# Patient Record
Sex: MALE | Race: WHITE | NOT HISPANIC OR LATINO
[De-identification: names, ages, dates, MRNs, and addresses within clinical notes are randomized per-mention and may not be internally consistent; named-entity substitution may affect disease eponyms.]

---

## 2017-02-10 PROBLEM — Z00.00 ENCOUNTER FOR PREVENTIVE HEALTH EXAMINATION: Status: ACTIVE | Noted: 2017-02-10

## 2017-02-17 ENCOUNTER — APPOINTMENT (OUTPATIENT)
Dept: WOUND CARE | Facility: CLINIC | Age: 67
End: 2017-02-17

## 2017-02-24 ENCOUNTER — APPOINTMENT (OUTPATIENT)
Dept: WOUND CARE | Facility: CLINIC | Age: 67
End: 2017-02-24

## 2017-02-24 VITALS
DIASTOLIC BLOOD PRESSURE: 74 MMHG | HEIGHT: 71 IN | RESPIRATION RATE: 16 BRPM | TEMPERATURE: 97.6 F | SYSTOLIC BLOOD PRESSURE: 152 MMHG | HEART RATE: 88 BPM

## 2017-03-10 ENCOUNTER — APPOINTMENT (OUTPATIENT)
Dept: WOUND CARE | Facility: CLINIC | Age: 67
End: 2017-03-10

## 2017-04-10 ENCOUNTER — APPOINTMENT (OUTPATIENT)
Dept: WOUND CARE | Facility: CLINIC | Age: 67
End: 2017-04-10

## 2017-04-17 ENCOUNTER — APPOINTMENT (OUTPATIENT)
Dept: WOUND CARE | Facility: CLINIC | Age: 67
End: 2017-04-17

## 2017-04-28 ENCOUNTER — APPOINTMENT (OUTPATIENT)
Dept: WOUND CARE | Facility: CLINIC | Age: 67
End: 2017-04-28

## 2017-05-19 ENCOUNTER — APPOINTMENT (OUTPATIENT)
Dept: WOUND CARE | Facility: CLINIC | Age: 67
End: 2017-05-19

## 2017-05-26 ENCOUNTER — APPOINTMENT (OUTPATIENT)
Dept: WOUND CARE | Facility: CLINIC | Age: 67
End: 2017-05-26

## 2017-06-02 ENCOUNTER — APPOINTMENT (OUTPATIENT)
Dept: WOUND CARE | Facility: CLINIC | Age: 67
End: 2017-06-02

## 2017-06-09 ENCOUNTER — INPATIENT (INPATIENT)
Facility: HOSPITAL | Age: 67
LOS: 6 days | Discharge: HOME | DRG: 603 | End: 2017-06-16
Attending: INTERNAL MEDICINE | Admitting: INTERNAL MEDICINE
Payer: COMMERCIAL

## 2017-06-09 ENCOUNTER — APPOINTMENT (OUTPATIENT)
Dept: WOUND CARE | Facility: CLINIC | Age: 67
End: 2017-06-09

## 2017-06-09 VITALS
DIASTOLIC BLOOD PRESSURE: 56 MMHG | HEART RATE: 79 BPM | OXYGEN SATURATION: 98 % | RESPIRATION RATE: 14 BRPM | SYSTOLIC BLOOD PRESSURE: 160 MMHG

## 2017-06-09 DIAGNOSIS — R60.0 LOCALIZED EDEMA: ICD-10-CM

## 2017-06-09 DIAGNOSIS — Z29.9 ENCOUNTER FOR PROPHYLACTIC MEASURES, UNSPECIFIED: ICD-10-CM

## 2017-06-09 DIAGNOSIS — I89.0 LYMPHEDEMA, NOT ELSEWHERE CLASSIFIED: ICD-10-CM

## 2017-06-09 DIAGNOSIS — L03.119 CELLULITIS OF UNSPECIFIED PART OF LIMB: ICD-10-CM

## 2017-06-09 DIAGNOSIS — I87.2 VENOUS INSUFFICIENCY (CHRONIC) (PERIPHERAL): ICD-10-CM

## 2017-06-09 LAB
ALBUMIN SERPL ELPH-MCNC: 3.4 G/DL — SIGNIFICANT CHANGE UP (ref 3.3–5)
ALP SERPL-CCNC: 82 U/L — SIGNIFICANT CHANGE UP (ref 40–120)
ALT FLD-CCNC: 16 U/L RC — SIGNIFICANT CHANGE UP (ref 10–45)
ANION GAP SERPL CALC-SCNC: 12 MMOL/L — SIGNIFICANT CHANGE UP (ref 5–17)
APTT BLD: 51.4 SEC — HIGH (ref 27.5–37.4)
AST SERPL-CCNC: 14 U/L — SIGNIFICANT CHANGE UP (ref 10–40)
BASOPHILS # BLD AUTO: 0.1 K/UL — SIGNIFICANT CHANGE UP (ref 0–0.2)
BASOPHILS NFR BLD AUTO: 0.6 % — SIGNIFICANT CHANGE UP (ref 0–2)
BILIRUB SERPL-MCNC: 0.3 MG/DL — SIGNIFICANT CHANGE UP (ref 0.2–1.2)
BUN SERPL-MCNC: 17 MG/DL — SIGNIFICANT CHANGE UP (ref 7–23)
CALCIUM SERPL-MCNC: 9.1 MG/DL — SIGNIFICANT CHANGE UP (ref 8.4–10.5)
CHLORIDE SERPL-SCNC: 105 MMOL/L — SIGNIFICANT CHANGE UP (ref 96–108)
CO2 SERPL-SCNC: 25 MMOL/L — SIGNIFICANT CHANGE UP (ref 22–31)
CREAT SERPL-MCNC: 0.96 MG/DL — SIGNIFICANT CHANGE UP (ref 0.5–1.3)
EOSINOPHIL # BLD AUTO: 0.2 K/UL — SIGNIFICANT CHANGE UP (ref 0–0.5)
EOSINOPHIL NFR BLD AUTO: 1.8 % — SIGNIFICANT CHANGE UP (ref 0–6)
GAS PNL BLDV: SIGNIFICANT CHANGE UP
GLUCOSE SERPL-MCNC: 109 MG/DL — HIGH (ref 70–99)
HCT VFR BLD CALC: 38.5 % — LOW (ref 39–50)
HGB BLD-MCNC: 12.8 G/DL — LOW (ref 13–17)
INR BLD: 1.1 RATIO — SIGNIFICANT CHANGE UP (ref 0.88–1.16)
LYMPHOCYTES # BLD AUTO: 1.4 K/UL — SIGNIFICANT CHANGE UP (ref 1–3.3)
LYMPHOCYTES # BLD AUTO: 15.3 % — SIGNIFICANT CHANGE UP (ref 13–44)
MCHC RBC-ENTMCNC: 30.5 PG — SIGNIFICANT CHANGE UP (ref 27–34)
MCHC RBC-ENTMCNC: 33.3 GM/DL — SIGNIFICANT CHANGE UP (ref 32–36)
MCV RBC AUTO: 91.4 FL — SIGNIFICANT CHANGE UP (ref 80–100)
MONOCYTES # BLD AUTO: 0.9 K/UL — SIGNIFICANT CHANGE UP (ref 0–0.9)
MONOCYTES NFR BLD AUTO: 9.7 % — SIGNIFICANT CHANGE UP (ref 2–14)
NEUTROPHILS # BLD AUTO: 6.4 K/UL — SIGNIFICANT CHANGE UP (ref 1.8–7.4)
NEUTROPHILS NFR BLD AUTO: 72.6 % — SIGNIFICANT CHANGE UP (ref 43–77)
NT-PROBNP SERPL-SCNC: 95 PG/ML — SIGNIFICANT CHANGE UP (ref 0–300)
PLATELET # BLD AUTO: 255 K/UL — SIGNIFICANT CHANGE UP (ref 150–400)
POTASSIUM SERPL-MCNC: 3.9 MMOL/L — SIGNIFICANT CHANGE UP (ref 3.5–5.3)
POTASSIUM SERPL-SCNC: 3.9 MMOL/L — SIGNIFICANT CHANGE UP (ref 3.5–5.3)
PROT SERPL-MCNC: 6.8 G/DL — SIGNIFICANT CHANGE UP (ref 6–8.3)
PROTHROM AB SERPL-ACNC: 11.9 SEC — SIGNIFICANT CHANGE UP (ref 9.8–12.7)
RBC # BLD: 4.21 M/UL — SIGNIFICANT CHANGE UP (ref 4.2–5.8)
RBC # FLD: 12.7 % — SIGNIFICANT CHANGE UP (ref 10.3–14.5)
SODIUM SERPL-SCNC: 142 MMOL/L — SIGNIFICANT CHANGE UP (ref 135–145)
WBC # BLD: 8.8 K/UL — SIGNIFICANT CHANGE UP (ref 3.8–10.5)
WBC # FLD AUTO: 8.8 K/UL — SIGNIFICANT CHANGE UP (ref 3.8–10.5)

## 2017-06-09 PROCEDURE — 99285 EMERGENCY DEPT VISIT HI MDM: CPT

## 2017-06-09 PROCEDURE — 99223 1ST HOSP IP/OBS HIGH 75: CPT | Mod: AI

## 2017-06-09 RX ORDER — ACETAMINOPHEN 500 MG
650 TABLET ORAL EVERY 6 HOURS
Qty: 0 | Refills: 0 | Status: DISCONTINUED | OUTPATIENT
Start: 2017-06-09 | End: 2017-06-16

## 2017-06-09 RX ORDER — VANCOMYCIN HCL 1 G
1000 VIAL (EA) INTRAVENOUS EVERY 12 HOURS
Qty: 0 | Refills: 0 | Status: DISCONTINUED | OUTPATIENT
Start: 2017-06-09 | End: 2017-06-10

## 2017-06-09 RX ORDER — CEFAZOLIN SODIUM 1 G
1000 VIAL (EA) INJECTION ONCE
Qty: 0 | Refills: 0 | Status: COMPLETED | OUTPATIENT
Start: 2017-06-09 | End: 2017-06-09

## 2017-06-09 RX ORDER — BACITRACIN ZINC 500 UNIT/G
1 OINTMENT IN PACKET (EA) TOPICAL
Qty: 0 | Refills: 0 | Status: DISCONTINUED | OUTPATIENT
Start: 2017-06-09 | End: 2017-06-16

## 2017-06-09 RX ORDER — TETANUS TOXOID, REDUCED DIPHTHERIA TOXOID AND ACELLULAR PERTUSSIS VACCINE, ADSORBED 5; 2.5; 8; 8; 2.5 [IU]/.5ML; [IU]/.5ML; UG/.5ML; UG/.5ML; UG/.5ML
0.5 SUSPENSION INTRAMUSCULAR ONCE
Qty: 0 | Refills: 0 | Status: COMPLETED | OUTPATIENT
Start: 2017-06-09 | End: 2017-06-09

## 2017-06-09 RX ORDER — HEPARIN SODIUM 5000 [USP'U]/ML
5000 INJECTION INTRAVENOUS; SUBCUTANEOUS EVERY 8 HOURS
Qty: 0 | Refills: 0 | Status: DISCONTINUED | OUTPATIENT
Start: 2017-06-09 | End: 2017-06-16

## 2017-06-09 RX ORDER — LOSARTAN POTASSIUM 25 MG/1
25 TABLET, FILM COATED ORAL
Refills: 0 | Status: ACTIVE | COMMUNITY

## 2017-06-09 RX ADMIN — Medication 250 MILLIGRAM(S): at 23:03

## 2017-06-09 RX ADMIN — Medication 650 MILLIGRAM(S): at 23:36

## 2017-06-09 RX ADMIN — Medication 100 MILLIGRAM(S): at 20:45

## 2017-06-09 RX ADMIN — HEPARIN SODIUM 5000 UNIT(S): 5000 INJECTION INTRAVENOUS; SUBCUTANEOUS at 23:04

## 2017-06-09 RX ADMIN — Medication 650 MILLIGRAM(S): at 23:06

## 2017-06-09 RX ADMIN — TETANUS TOXOID, REDUCED DIPHTHERIA TOXOID AND ACELLULAR PERTUSSIS VACCINE, ADSORBED 0.5 MILLILITER(S): 5; 2.5; 8; 8; 2.5 SUSPENSION INTRAMUSCULAR at 20:45

## 2017-06-09 NOTE — ED ADULT NURSE NOTE - OBJECTIVE STATEMENT
67 y.o male c c/o lower leg edema. Pt has wound care set up. R lower leg more edematous then L leg.  Ace bandages on legs upon exam- re wrapped today. weeping edema from R lower- foul smelling drainage. Afebrile. Lower legs extremely red. Blisters present on toes exposed from ace badging. No SOB/CP. No NVD. No family at bedside. Poor personal hygiene.

## 2017-06-09 NOTE — ED PROVIDER NOTE - PHYSICAL EXAMINATION
Galilea Barajas M.D.:   patient awake alert seen lying on stretcher, appears disheveled with poor hygiene NAD .   LUNGS coarse breath sounds b/l.   CARD RRR no m/r/g.    Abdomen soft obese NT ND no rebound no guarding no CVA tenderness umbilical hernia.   EXT WWP significant pitting and weeping edema of b/l LE up to knees with associated redness, tender to palpation CV 2+DP/PT bilaterally.   neuro A&Ox3 .    skin warm and dry  HEENT: moist mucous membranes, PERRL, EOMI

## 2017-06-09 NOTE — ED PROVIDER NOTE - OBJECTIVE STATEMENT
Galilea Barajas M.D: 67yoM hx HTN, lymphedema, chronic venous stasis, follows with wound care, homeless, sent in from wound care secondary to worsening of swelling/pain/ weeping of lower extremities. increasing blisters to b/l LE and increasing pain. denies SOB, CP, fevers/chills, recent travel, smoking. last had US in January with vascular.

## 2017-06-09 NOTE — H&P ADULT - PMH
Chronic venous stasis dermatitis of both lower extremities    Lymphedema Chronic venous stasis dermatitis of both lower extremities    Essential hypertension    Lymphedema

## 2017-06-09 NOTE — H&P ADULT - HISTORY OF PRESENT ILLNESS
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 notes he follows with wound care once a week. he has chronic bl le lymphedema and chronic venous stasis and over the past week he has noticed worsening swelling, redness, warmth and pain with walking. he notes wounds have developed on both of his legs which are draining pus and clear fluid. denies fevers, chills, night sweats, chest pain, sob, abd pain, diarrhea, nausea, vomiting. he has dopplers of his legs every 6 months to r/o dvt, last were negative in january.    ED VS: Tmax: 98, BP: 152-160/56-74, P: 75-79, R: 14-16, O2: 98-99% on RA  ED meds: ancef 1g, tdap 0.5mL IM

## 2017-06-09 NOTE — H&P ADULT - ASSESSMENT
67M hx htn, lymphedema, chronic venous stasis (f/w wound care) p/w worsening swelling, pain, weeping from lower extremities.

## 2017-06-09 NOTE — H&P ADULT - PROBLEM SELECTOR PROBLEM 3
Chronic venous stasis dermatitis of both lower extremities

## 2017-06-09 NOTE — H&P ADULT - PROBLEM SELECTOR PLAN 4
heparin subq heparin subq  homeless - needs social work consult for safe dispo  dash diet  care discussed w/ np  ads to assume care in am  verify meds in am with [facility] [phone] ([phone] [facility] [place]

## 2017-06-09 NOTE — H&P ADULT - NSHPPHYSICALEXAM_GEN_ALL_CORE
GENERAL: No acute distress, well-developed  HEAD:  Atraumatic, Normocephalic  ENT: EOMI, PERRLA, conjunctiva and sclera clear, Neck supple, No JVD, moist mucosa  CHEST/LUNG: Clear to auscultation bilaterally; No wheeze, equal breath sounds bilaterally   BACK: No spinal tenderness, ROM intact  HEART: Regular rate and rhythm; No murmurs, rubs, or gallops  ABDOMEN: Soft, Nontender, Nondistended; Bowel sounds present  EXTREMITIES:  No clubbing, cyanosis, or edema  PSYCH: Normal behavior/affect  NEUROLOGY: AAOx3, non-focal, cranial nerves intact  SKIN: Normal color, No rashes or lesions GENERAL: No acute distress, well-developed  HEAD:  Atraumatic, Normocephalic  ENT: EOMI, PERRLA, conjunctiva and sclera clear, Neck supple, No JVD, moist mucosa  CHEST/LUNG: Clear to auscultation bilaterally; No wheeze, equal breath sounds bilaterally   BACK: No spinal tenderness, ROM intact  HEART: Regular rate and rhythm; No murmurs, rubs, or gallops  ABDOMEN: Soft, Nontender, Nondistended; Bowel sounds present  EXTREMITIES:  bl severe lower extremity edema, R>L, w/ overlying open anterior le wounds w/ serous drainage, tender/warm to touch, 2+ DP pulses bilaterally  PSYCH: Normal behavior/affect  NEUROLOGY: AAOx3, non-focal, cranial nerves intact  SKIN: bl severe lower extremity edema, R>L, w/ overlying open anterior le wounds w/ serous drainage, tender/warm to touch

## 2017-06-09 NOTE — ED PROVIDER NOTE - MEDICAL DECISION MAKING DETAILS
Galilea Barajas M.D: 67yoM w/ chronic b/l LE wounds that are reportedly worsening. no systemic signs of infection to suspect cellulitis, and no SOB/CP, dyspnea on exertion to suggest a CHF picture; will check basic labs, BNP; likely pt will require admission as pt also homeless and has no safe discharge plan

## 2017-06-09 NOTE — H&P ADULT - NSHPLABSRESULTS_GEN_ALL_CORE
Labs personally reviewed:                        12.8   8.8   )-----------( 255      ( 09 Jun 2017 19:23 )             38.5     06-09  142  |  105  |  17  ----------------------------<  109<H>  3.9   |  25  |  0.96  Ca    9.1      09 Jun 2017 19:23  TPro  6.8  /  Alb  3.4  /  TBili  0.3  /  DBili  x   /  AST  14  /  ALT  16  /  AlkPhos  82  06-09  LIVER FUNCTIONS - ( 09 Jun 2017 19:23 )  Alb: 3.4 g/dL / Pro: 6.8 g/dL / ALK PHOS: 82 U/L / ALT: 16 U/L RC / AST: 14 U/L / GGT: x         PT/INR - ( 09 Jun 2017 19:23 )   PT: 11.9 sec;   INR: 1.10 ratio       PTT - ( 09 Jun 2017 19:23 )  PTT:51.4 sec    Imaging:  No imaging performed.    EKG ordered.

## 2017-06-09 NOTE — ED PROVIDER NOTE - CARE PLAN
Principal Discharge DX:	Bilateral edema of lower extremity Principal Discharge DX:	Bilateral edema of lower extremity  Secondary Diagnosis:	Cellulitis of right lower limb

## 2017-06-09 NOTE — H&P ADULT - NSHPSOCIALHISTORY_GEN_ALL_CORE
Social History:    Marital Status:  (   )    (   ) Single    (   )    (  )   Occupation:   Lives with: (  ) alone  (  ) children   (  ) spouse   (  ) parents  (  ) other    Substance Use (street drugs): (  ) never used  (  ) other:  Tobacco Usage:  (   ) never smoked   (   ) former smoker   (   ) current smoker  (     ) pack year  (        ) last cigarette date  Alcohol Usage: no etoh usage Social History:    Marital Status:  (   )    ( x  ) Single    (   )    (  )   Occupation:   Lives with: (  ) alone  (  ) children   (  ) spouse   (  ) parents  ( x ) other - homeless    Substance Use (street drugs): ( x ) never used  (  ) other:  Tobacco Usage:  (  x ) never smoked   (   ) former smoker   (   ) current smoker  (     ) pack year  (        ) last cigarette date  Alcohol: no etoh usage

## 2017-06-09 NOTE — H&P ADULT - NSHPREVIEWOFSYSTEMS_GEN_ALL_CORE
REVIEW OF SYSTEMS:    CONSTITUTIONAL: No weakness, fevers or chills  EYES/ENT: No visual changes;  No dysphagia  NECK: No pain or stiffness  RESPIRATORY: No cough, wheezing, hemoptysis; No shortness of breath  CARDIOVASCULAR: No chest pain or palpitations; No lower extremity edema  GASTROINTESTINAL: No abdominal or epigastric pain. No nausea, vomiting, or hematemesis; No diarrhea or constipation. No melena or hematochezia.  BACK: No back pain  GENITOURINARY: No dysuria, frequency or hematuria  NEUROLOGICAL: No numbness or weakness  EXTREMITIES:   SKIN: No itching, burning, rashes, or lesions   PSYCH: no agitation  All other review of systems is negative unless indicated above. CONSTITUTIONAL: No weakness, fevers or chills  EYES/ENT: No visual changes;  No dysphagia  NECK: No pain or stiffness  RESPIRATORY: No cough, wheezing, hemoptysis; No shortness of breath  CARDIOVASCULAR: No chest pain or palpitations; No lower extremity edema  GASTROINTESTINAL: No abdominal or epigastric pain. No nausea, vomiting, or hematemesis; No diarrhea or constipation. No melena or hematochezia.  BACK: No back pain  GENITOURINARY: No dysuria, frequency or hematuria  NEUROLOGICAL: No numbness or weakness  EXTREMITIES: bl le pain, swelling, redness, drainage  SKIN: No itching, burning  PSYCH: no agitation  All other review of systems is negative unless indicated above.

## 2017-06-09 NOTE — ED PROVIDER NOTE - ATTENDING CONTRIBUTION TO CARE
referred to emergency department by wound care specialist for worsening discharge and redness of both legs, right greater than left, on my exam:  awake, alert, cooperative, bilateral swollen legs, clear discharge of right leg, multiple open areas, no lymphangitis, neurovascular intact. Jeramie Baxter MD (attending)

## 2017-06-09 NOTE — H&P ADULT - PROBLEM SELECTOR PLAN 1
afebrile, no leukocytosis. exam w/ le redness, pain, drainage c/f purulent cellulitis.  -c/w iv abx w/ vancomycin  -pain control w/ percocet  -elevate legs qid  -homeless - needs social work consult for safe dispo Non-toxic, afebrile, no leukocytosis. exam w/ le redness, pain, drainage c/f purulent cellulitis.  -c/w iv abx w/ vancomycin  -monitor cr, trough after 4th dose  -pain control w/ percocet  -apply bacitracin, gauze dressing  -wound care consult  -elevate legs qid Non-toxic, afebrile, no leukocytosis. exam w/ le redness, pain, drainage c/f purulent cellulitis.  -c/w iv abx w/ vancomycin  -monitor cr, trough after 4th dose  -pain control w/ percocet  -apply bacitracin, gauze dressing  -wound care consult  -elevate legs qid  -check le dopplers

## 2017-06-09 NOTE — ED ADULT NURSE REASSESSMENT NOTE - NS ED NURSE REASSESS COMMENT FT1
Attempted to give report at 20:09 and 20:15. Called [facility] Gus twice was placed on hold for over 6 minutes each time, with no one returning to me. Unable to give report to anyone.

## 2017-06-10 DIAGNOSIS — I10 ESSENTIAL (PRIMARY) HYPERTENSION: ICD-10-CM

## 2017-06-10 DIAGNOSIS — L03.119 CELLULITIS OF UNSPECIFIED PART OF LIMB: ICD-10-CM

## 2017-06-10 DIAGNOSIS — Z59.0 HOMELESSNESS: ICD-10-CM

## 2017-06-10 DIAGNOSIS — H40.9 UNSPECIFIED GLAUCOMA: ICD-10-CM

## 2017-06-10 LAB
ANION GAP SERPL CALC-SCNC: 10 MMOL/L — SIGNIFICANT CHANGE UP (ref 5–17)
BASOPHILS # BLD AUTO: 0.02 K/UL — SIGNIFICANT CHANGE UP (ref 0–0.2)
BASOPHILS NFR BLD AUTO: 0.2 % — SIGNIFICANT CHANGE UP (ref 0–2)
BUN SERPL-MCNC: 14 MG/DL — SIGNIFICANT CHANGE UP (ref 7–23)
CALCIUM SERPL-MCNC: 8.7 MG/DL — SIGNIFICANT CHANGE UP (ref 8.4–10.5)
CHLORIDE SERPL-SCNC: 104 MMOL/L — SIGNIFICANT CHANGE UP (ref 96–108)
CHOLEST SERPL-MCNC: 119 MG/DL — SIGNIFICANT CHANGE UP (ref 10–199)
CO2 SERPL-SCNC: 20 MMOL/L — LOW (ref 22–31)
CREAT SERPL-MCNC: 0.97 MG/DL — SIGNIFICANT CHANGE UP (ref 0.5–1.3)
EOSINOPHIL # BLD AUTO: 0.12 K/UL — SIGNIFICANT CHANGE UP (ref 0–0.5)
EOSINOPHIL NFR BLD AUTO: 1.4 % — SIGNIFICANT CHANGE UP (ref 0–6)
GLUCOSE SERPL-MCNC: 145 MG/DL — HIGH (ref 70–99)
HBA1C BLD-MCNC: 6.1 % — HIGH (ref 4–5.6)
HCT VFR BLD CALC: 36.6 % — LOW (ref 39–50)
HDLC SERPL-MCNC: 50 MG/DL — SIGNIFICANT CHANGE UP (ref 40–125)
HGB BLD-MCNC: 11.7 G/DL — LOW (ref 13–17)
IMM GRANULOCYTES NFR BLD AUTO: 0.2 % — SIGNIFICANT CHANGE UP (ref 0–1.5)
LIPID PNL WITH DIRECT LDL SERPL: 54 MG/DL — SIGNIFICANT CHANGE UP
LYMPHOCYTES # BLD AUTO: 1.24 K/UL — SIGNIFICANT CHANGE UP (ref 1–3.3)
LYMPHOCYTES # BLD AUTO: 14.7 % — SIGNIFICANT CHANGE UP (ref 13–44)
MCHC RBC-ENTMCNC: 28.3 PG — SIGNIFICANT CHANGE UP (ref 27–34)
MCHC RBC-ENTMCNC: 32 GM/DL — SIGNIFICANT CHANGE UP (ref 32–36)
MCV RBC AUTO: 88.6 FL — SIGNIFICANT CHANGE UP (ref 80–100)
MONOCYTES # BLD AUTO: 0.89 K/UL — SIGNIFICANT CHANGE UP (ref 0–0.9)
MONOCYTES NFR BLD AUTO: 10.6 % — SIGNIFICANT CHANGE UP (ref 2–14)
NEUTROPHILS # BLD AUTO: 6.14 K/UL — SIGNIFICANT CHANGE UP (ref 1.8–7.4)
NEUTROPHILS NFR BLD AUTO: 72.9 % — SIGNIFICANT CHANGE UP (ref 43–77)
PLATELET # BLD AUTO: 263 K/UL — SIGNIFICANT CHANGE UP (ref 150–400)
POTASSIUM SERPL-MCNC: 4 MMOL/L — SIGNIFICANT CHANGE UP (ref 3.5–5.3)
POTASSIUM SERPL-SCNC: 4 MMOL/L — SIGNIFICANT CHANGE UP (ref 3.5–5.3)
RBC # BLD: 4.13 M/UL — LOW (ref 4.2–5.8)
RBC # FLD: 14 % — SIGNIFICANT CHANGE UP (ref 10.3–14.5)
SODIUM SERPL-SCNC: 134 MMOL/L — LOW (ref 135–145)
TOTAL CHOLESTEROL/HDL RATIO MEASUREMENT: 2.4 RATIO — LOW (ref 3.4–9.6)
TRIGL SERPL-MCNC: 73 MG/DL — SIGNIFICANT CHANGE UP (ref 10–149)
TSH SERPL-MCNC: 0.9 UIU/ML — SIGNIFICANT CHANGE UP (ref 0.27–4.2)
WBC # BLD: 8.43 K/UL — SIGNIFICANT CHANGE UP (ref 3.8–10.5)
WBC # FLD AUTO: 8.43 K/UL — SIGNIFICANT CHANGE UP (ref 3.8–10.5)

## 2017-06-10 PROCEDURE — 99233 SBSQ HOSP IP/OBS HIGH 50: CPT

## 2017-06-10 PROCEDURE — 93010 ELECTROCARDIOGRAM REPORT: CPT

## 2017-06-10 RX ORDER — LATANOPROST 0.05 MG/ML
1 SOLUTION/ DROPS OPHTHALMIC; TOPICAL AT BEDTIME
Qty: 0 | Refills: 0 | Status: DISCONTINUED | OUTPATIENT
Start: 2017-06-10 | End: 2017-06-16

## 2017-06-10 RX ORDER — METOPROLOL TARTRATE 50 MG
50 TABLET ORAL
Qty: 0 | Refills: 0 | Status: DISCONTINUED | OUTPATIENT
Start: 2017-06-10 | End: 2017-06-16

## 2017-06-10 RX ORDER — VANCOMYCIN HCL 1 G
1000 VIAL (EA) INTRAVENOUS EVERY 8 HOURS
Qty: 0 | Refills: 0 | Status: DISCONTINUED | OUTPATIENT
Start: 2017-06-10 | End: 2017-06-13

## 2017-06-10 RX ORDER — VANCOMYCIN HCL 1 G
1000 VIAL (EA) INTRAVENOUS ONCE
Qty: 0 | Refills: 0 | Status: COMPLETED | OUTPATIENT
Start: 2017-06-10 | End: 2017-06-10

## 2017-06-10 RX ORDER — VANCOMYCIN HCL 1 G
VIAL (EA) INTRAVENOUS
Qty: 0 | Refills: 0 | Status: DISCONTINUED | OUTPATIENT
Start: 2017-06-10 | End: 2017-06-13

## 2017-06-10 RX ORDER — POTASSIUM CHLORIDE 20 MEQ
10 PACKET (EA) ORAL DAILY
Qty: 0 | Refills: 0 | Status: DISCONTINUED | OUTPATIENT
Start: 2017-06-10 | End: 2017-06-16

## 2017-06-10 RX ORDER — HYDRALAZINE HCL 50 MG
100 TABLET ORAL
Qty: 0 | Refills: 0 | Status: DISCONTINUED | OUTPATIENT
Start: 2017-06-10 | End: 2017-06-10

## 2017-06-10 RX ORDER — LOSARTAN POTASSIUM 100 MG/1
100 TABLET, FILM COATED ORAL DAILY
Qty: 0 | Refills: 0 | Status: DISCONTINUED | OUTPATIENT
Start: 2017-06-10 | End: 2017-06-16

## 2017-06-10 RX ORDER — FUROSEMIDE 40 MG
20 TABLET ORAL DAILY
Qty: 0 | Refills: 0 | Status: DISCONTINUED | OUTPATIENT
Start: 2017-06-10 | End: 2017-06-13

## 2017-06-10 RX ADMIN — Medication 650 MILLIGRAM(S): at 20:03

## 2017-06-10 RX ADMIN — Medication 20 MILLIGRAM(S): at 12:04

## 2017-06-10 RX ADMIN — Medication 250 MILLIGRAM(S): at 22:39

## 2017-06-10 RX ADMIN — Medication 650 MILLIGRAM(S): at 19:33

## 2017-06-10 RX ADMIN — Medication 10 MILLIEQUIVALENT(S): at 12:12

## 2017-06-10 RX ADMIN — HEPARIN SODIUM 5000 UNIT(S): 5000 INJECTION INTRAVENOUS; SUBCUTANEOUS at 21:30

## 2017-06-10 RX ADMIN — Medication 1 APPLICATION(S): at 05:21

## 2017-06-10 RX ADMIN — HEPARIN SODIUM 5000 UNIT(S): 5000 INJECTION INTRAVENOUS; SUBCUTANEOUS at 05:20

## 2017-06-10 RX ADMIN — LATANOPROST 1 DROP(S): 0.05 SOLUTION/ DROPS OPHTHALMIC; TOPICAL at 21:31

## 2017-06-10 RX ADMIN — Medication 250 MILLIGRAM(S): at 12:13

## 2017-06-10 RX ADMIN — Medication 50 MILLIGRAM(S): at 17:56

## 2017-06-10 RX ADMIN — LOSARTAN POTASSIUM 100 MILLIGRAM(S): 100 TABLET, FILM COATED ORAL at 12:04

## 2017-06-10 RX ADMIN — HEPARIN SODIUM 5000 UNIT(S): 5000 INJECTION INTRAVENOUS; SUBCUTANEOUS at 16:08

## 2017-06-10 SDOH — ECONOMIC STABILITY - HOUSING INSECURITY: HOMELESSNESS: Z59.0

## 2017-06-10 NOTE — PROGRESS NOTE ADULT - PROBLEM SELECTOR PLAN 2
Wound care consulted.  Leg elevated to help with edema.  Continue home dose of Lasix 20mg PO daily for diuresis.  Check LE duplex - r/o DVT

## 2017-06-10 NOTE — PROGRESS NOTE ADULT - SUBJECTIVE AND OBJECTIVE BOX
Patient is a 67y old  Male who presents with a chief complaint of Lower extremity pain, swelling, redness (09 Jun 2017 21:28)      SUBJECTIVE / OVERNIGHT EVENTS:    MEDICATIONS  (STANDING):  BACItracin   Ointment 1Application(s) Topical two times a day  heparin  Injectable 5000Unit(s) SubCutaneous every 8 hours  potassium chloride    Tablet ER 10milliEquivalent(s) Oral daily  furosemide    Tablet 20milliGRAM(s) Oral daily  metoprolol 50milliGRAM(s) Oral two times a day  losartan 100milliGRAM(s) Oral daily  latanoprost 0.005% Ophthalmic Solution 1Drop(s) Both EYES at bedtime  vancomycin  IVPB 1000milliGRAM(s) IV Intermittent once  vancomycin  IVPB 1000milliGRAM(s) IV Intermittent every 8 hours  vancomycin  IVPB  IV Intermittent     MEDICATIONS  (PRN):  oxyCODONE  5 mG/acetaminophen 325 mG 2Tablet(s) Oral every 6 hours PRN Severe Pain (7 - 10)  acetaminophen   Tablet 650milliGRAM(s) Oral every 6 hours PRN For Temp greater than 38 C (100.4 F)  acetaminophen   Tablet. 650milliGRAM(s) Oral every 6 hours PRN Moderate Pain (4 - 6)      Vital Signs Last 24 Hrs  T(C): 37.9, Max: 37.9 (06-10 @ 05:16)  HR: 98 (75 - 98)  BP: 150/63 (149/72 - 160/56)  RR: 18 (14 - 20)  SpO2: 95% (95% - 99%)  Wt(kg): --  CAPILLARY BLOOD GLUCOSE    I&O's Summary      PHYSICAL EXAM:  GENERAL: NAD, well-developed  HEAD:  Atraumatic, Normocephalic  EYES: EOMI, PERRLA, conjunctiva and sclera clear  NECK: Supple, No JVD  CHEST/LUNG: Clear to auscultation bilaterally; No wheeze  HEART: Regular rate and rhythm; No murmurs, rubs, or gallops  ABDOMEN: Soft, Nontender, mildly distended and obese abdomen; Bowel sounds present  EXTREMITIES:  No clubbing, cyanosis. Significant edema 4+ from lymphaedema  PSYCH: AAOx3  NEUROLOGY: non-focal  SKIN: bilateral LE wounds draining some pus associated with cellulitis.    LABS:                        11.7   8.43  )-----------( 263      ( 10 Joaquin 2017 07:47 )             36.6     06-10    134<L>  |  104  |  14  ----------------------------<  145<H>  4.0   |  20<L>  |  0.97    Ca    8.7      10 Joaquin 2017 07:44    TPro  6.8  /  Alb  3.4  /  TBili  0.3  /  DBili  x   /  AST  14  /  ALT  16  /  AlkPhos  82  06-09    PT/INR - ( 09 Jun 2017 19:23 )   PT: 11.9 sec;   INR: 1.10 ratio         PTT - ( 09 Jun 2017 19:23 )  PTT:51.4 sec

## 2017-06-10 NOTE — PROGRESS NOTE ADULT - PROBLEM SELECTOR PLAN 1
I discussed with our pharmacists - based on his renal function (normal) and weight (morbid obesity) in order to obtain a vancomycin trough of about 15, will need to dose Vancomycin 1g IV q8h.  Monitor to infection resolution.   The patient is nontoxic appearing, no leukocytosis, no fever. Blood cultures were nor sent in the ED (will order now).

## 2017-06-11 LAB
VANCOMYCIN TROUGH SERPL-MCNC: 10.8 UG/ML — SIGNIFICANT CHANGE UP (ref 10–20)
VANCOMYCIN TROUGH SERPL-MCNC: 8.7 UG/ML — LOW (ref 10–20)

## 2017-06-11 PROCEDURE — 93970 EXTREMITY STUDY: CPT | Mod: 26

## 2017-06-11 PROCEDURE — 99233 SBSQ HOSP IP/OBS HIGH 50: CPT

## 2017-06-11 RX ADMIN — Medication 10 MILLIEQUIVALENT(S): at 14:07

## 2017-06-11 RX ADMIN — Medication 50 MILLIGRAM(S): at 05:37

## 2017-06-11 RX ADMIN — HEPARIN SODIUM 5000 UNIT(S): 5000 INJECTION INTRAVENOUS; SUBCUTANEOUS at 22:47

## 2017-06-11 RX ADMIN — Medication 250 MILLIGRAM(S): at 05:37

## 2017-06-11 RX ADMIN — Medication 650 MILLIGRAM(S): at 04:08

## 2017-06-11 RX ADMIN — HEPARIN SODIUM 5000 UNIT(S): 5000 INJECTION INTRAVENOUS; SUBCUTANEOUS at 14:05

## 2017-06-11 RX ADMIN — Medication 650 MILLIGRAM(S): at 05:15

## 2017-06-11 RX ADMIN — LATANOPROST 1 DROP(S): 0.05 SOLUTION/ DROPS OPHTHALMIC; TOPICAL at 22:48

## 2017-06-11 RX ADMIN — Medication 325 MILLIGRAM(S): at 14:05

## 2017-06-11 RX ADMIN — Medication 20 MILLIGRAM(S): at 05:37

## 2017-06-11 RX ADMIN — LOSARTAN POTASSIUM 100 MILLIGRAM(S): 100 TABLET, FILM COATED ORAL at 14:05

## 2017-06-11 RX ADMIN — Medication 50 MILLIGRAM(S): at 17:57

## 2017-06-11 RX ADMIN — Medication 1 APPLICATION(S): at 05:35

## 2017-06-11 RX ADMIN — Medication 650 MILLIGRAM(S): at 15:14

## 2017-06-11 RX ADMIN — Medication 1 APPLICATION(S): at 22:25

## 2017-06-11 RX ADMIN — Medication 250 MILLIGRAM(S): at 14:08

## 2017-06-11 RX ADMIN — Medication 250 MILLIGRAM(S): at 22:47

## 2017-06-11 NOTE — PROGRESS NOTE ADULT - SUBJECTIVE AND OBJECTIVE BOX
Patient is a 67y old  Male who presents with a chief complaint of Lower extremity pain, swelling, redness (09 Jun 2017 21:28)      SUBJECTIVE / OVERNIGHT EVENTS:    MEDICATIONS  (STANDING):  BACItracin   Ointment 1Application(s) Topical two times a day  heparin  Injectable 5000Unit(s) SubCutaneous every 8 hours  potassium chloride    Tablet ER 10milliEquivalent(s) Oral daily  furosemide    Tablet 20milliGRAM(s) Oral daily  metoprolol 50milliGRAM(s) Oral two times a day  losartan 100milliGRAM(s) Oral daily  latanoprost 0.005% Ophthalmic Solution 1Drop(s) Both EYES at bedtime  vancomycin  IVPB 1000milliGRAM(s) IV Intermittent every 8 hours  vancomycin  IVPB  IV Intermittent     MEDICATIONS  (PRN):  oxyCODONE  5 mG/acetaminophen 325 mG 2Tablet(s) Oral every 6 hours PRN Severe Pain (7 - 10)  acetaminophen   Tablet 650milliGRAM(s) Oral every 6 hours PRN For Temp greater than 38 C (100.4 F)  acetaminophen   Tablet. 650milliGRAM(s) Oral every 6 hours PRN Moderate Pain (4 - 6)      Vital Signs Last 24 Hrs  T(C): 36.9, Max: 37.6 (06-10 @ 21:36)  HR: 71 (63 - 74)  BP: 155/78 (148/75 - 155/78)  RR: 20 (20 - 20)  SpO2: 96% (95% - 98%)  Wt(kg): --  CAPILLARY BLOOD GLUCOSE    I&O's Summary    I & Os for current day (as of 11 Jun 2017 09:35)  =============================================  IN: 480 ml / OUT: 950 ml / NET: -470 ml      PHYSICAL EXAM:  GENERAL: NAD, well-developed  HEAD:  Atraumatic, Normocephalic  EYES: EOMI, PERRLA, conjunctiva and sclera clear  NECK: Supple, No JVD  CHEST/LUNG: Clear to auscultation bilaterally; No wheeze  HEART: Regular rate and rhythm; No murmurs, rubs, or gallops  ABDOMEN: Soft, Nontender, mildly distended and obese abdomen; Bowel sounds present  EXTREMITIES:  No clubbing, cyanosis. Significant edema 4+ from lymphoedema  PSYCH: AAOx3  NEUROLOGY: non-focal  SKIN: bilateral LE wounds associated with cellulitis. appears stable from yesterday     LABS:                        11.7   8.43  )-----------( 263      ( 10 Joaquin 2017 07:47 )             36.6     06-10    134<L>  |  104  |  14  ----------------------------<  145<H>  4.0   |  20<L>  |  0.97    Ca    8.7      10 Joaquin 2017 07:44    TPro  6.8  /  Alb  3.4  /  TBili  0.3  /  DBili  x   /  AST  14  /  ALT  16  /  AlkPhos  82  06-09    PT/INR - ( 09 Jun 2017 19:23 )   PT: 11.9 sec;   INR: 1.10 ratio         PTT - ( 09 Jun 2017 19:23 )  PTT:51.4 sec

## 2017-06-11 NOTE — PROGRESS NOTE ADULT - PROBLEM SELECTOR PLAN 1
Continue to dose Vancomycin 1g IV q8h (calculated by pharmacy for trough ~15). Vanco trough was done prematurely - will recheck a level tonight around 10pm.  Cellulitis appears stable, but fevers resolved today.  Wound care to see patient likely tomorrow.

## 2017-06-11 NOTE — PROGRESS NOTE ADULT - PROBLEM SELECTOR PLAN 2
Wound care consulted.  Leg elevated to help with edema.  Continue home dose of Lasix 20mg PO daily for diuresis (may help a little)  LE duplex is pending - r/o DVT bilaterally

## 2017-06-12 DIAGNOSIS — L97.901 NON-PRESSURE CHRONIC ULCER OF UNSPECIFIED PART OF UNSPECIFIED LOWER LEG LIMITED TO BREAKDOWN OF SKIN: ICD-10-CM

## 2017-06-12 DIAGNOSIS — R60.0 LOCALIZED EDEMA: ICD-10-CM

## 2017-06-12 PROCEDURE — 99222 1ST HOSP IP/OBS MODERATE 55: CPT

## 2017-06-12 PROCEDURE — 99233 SBSQ HOSP IP/OBS HIGH 50: CPT

## 2017-06-12 RX ADMIN — LOSARTAN POTASSIUM 100 MILLIGRAM(S): 100 TABLET, FILM COATED ORAL at 06:10

## 2017-06-12 RX ADMIN — Medication 1 APPLICATION(S): at 06:11

## 2017-06-12 RX ADMIN — Medication 20 MILLIGRAM(S): at 06:10

## 2017-06-12 RX ADMIN — Medication 50 MILLIGRAM(S): at 17:57

## 2017-06-12 RX ADMIN — Medication 10 MILLIEQUIVALENT(S): at 11:36

## 2017-06-12 RX ADMIN — HEPARIN SODIUM 5000 UNIT(S): 5000 INJECTION INTRAVENOUS; SUBCUTANEOUS at 14:11

## 2017-06-12 RX ADMIN — HEPARIN SODIUM 5000 UNIT(S): 5000 INJECTION INTRAVENOUS; SUBCUTANEOUS at 21:38

## 2017-06-12 RX ADMIN — HEPARIN SODIUM 5000 UNIT(S): 5000 INJECTION INTRAVENOUS; SUBCUTANEOUS at 06:10

## 2017-06-12 RX ADMIN — Medication 250 MILLIGRAM(S): at 14:11

## 2017-06-12 RX ADMIN — Medication 250 MILLIGRAM(S): at 06:10

## 2017-06-12 RX ADMIN — LATANOPROST 1 DROP(S): 0.05 SOLUTION/ DROPS OPHTHALMIC; TOPICAL at 21:38

## 2017-06-12 RX ADMIN — Medication 50 MILLIGRAM(S): at 06:10

## 2017-06-12 RX ADMIN — Medication 250 MILLIGRAM(S): at 21:39

## 2017-06-12 NOTE — CONSULT NOTE ADULT - SUBJECTIVE AND OBJECTIVE BOX
Wound SURGERY CONSULT NOTE    HPI: 67M hx htn, lymphedema, chronic venous stasis (f/w wound care) p/w worsening swelling, pain, weeping from lower extremities. notes he follows with wound care once a week. he has chronic bl le lymphedema and chronic venous stasis and over the past week he has noticed worsening swelling, redness, warmth and pain with walking. he notes wounds have developed on both of his legs which are draining pus and clear fluid. denies fevers, chills, night sweats, chest pain, sob, abd pain, diarrhea, nausea, vomiting. he has dopplers of his legs every 6 months to r/o dvt, last were negative in january.    PAST MEDICAL & SURGICAL HISTORY:  Essential hypertension  Chronic venous stasis dermatitis of both lower extremities s/p Laser tx  Lymphedema      REVIEW OF SYSTEMS    Musculoskeletal: chronic lymphedema see HPI  All other systems negative    MEDICATIONS  (STANDING):  BACItracin   Ointment 1Application(s) Topical two times a day  heparin  Injectable 5000Unit(s) SubCutaneous every 8 hours  potassium chloride    Tablet ER 10milliEquivalent(s) Oral daily  furosemide    Tablet 20milliGRAM(s) Oral daily  metoprolol 50milliGRAM(s) Oral two times a day  losartan 100milliGRAM(s) Oral daily  latanoprost 0.005% Ophthalmic Solution 1Drop(s) Both EYES at bedtime  vancomycin  IVPB 1000milliGRAM(s) IV Intermittent every 8 hours  vancomycin  IVPB  IV Intermittent     MEDICATIONS  (PRN):  oxyCODONE  5 mG/acetaminophen 325 mG 2Tablet(s) Oral every 6 hours PRN Severe Pain (7 - 10)  acetaminophen   Tablet 650milliGRAM(s) Oral every 6 hours PRN For Temp greater than 38 C (100.4 F)  acetaminophen   Tablet. 650milliGRAM(s) Oral every 6 hours PRN Moderate Pain (4 - 6)      Allergies    iodinated radiocontrast agents (Hives)      SOCIAL HISTORY:  HOMELESS, Single, denies smoking, drugs, ETOH    FAMILY HISTORY:  Family history of rheumatic heart disease (Father)  Family history of coronary artery disease (Father, Mother)      Vital Signs Last 24 Hrs  T(C): 36.7, Max: 37 (06-11 @ 21:41)  T(F): 98.1, Max: 98.6 (06-11 @ 21:41)  HR: 72 (69 - 72)  BP: 150/74 (150/74 - 154/82)  BP(mean): --  RR: 18 (18 - 20)  SpO2: 96% (96% - 96%)    PHYSICAL EXAM:    Constitutional:  A&Ox3  NAD  MO  (+)Versa CAre P500 bed    Respiratory: CTA    Cardiovascular: RRR    Gastrointestinal:  soft NT/ND (+)BD    Extremities/ Vascular/ Musculoskeletal:  FROM x4;  BLE lymphedema w/ hemosiderin staining  (+) scant serosanguinous drainage w/ serous crusting  No odor, erythema, increased warmth, tenderness, induration, fluctuance          Neurological:    Skin:    LABS:                Albumin, Serum: 3.4 g/dL (06-09 @ 19:23)      HgA1c  06-10 @ 07:47  6.1      RADIOLOGY & ADDITIONAL STUDIES:  Cultures: Wound SURGERY CONSULT NOTE    HPI: 67M hx htn, lymphedema, chronic venous stasis (f/w wound care) p/w worsening swelling, pain, weeping from lower extremities. notes he follows with wound care once a week. he has chronic bl le lymphedema and chronic venous stasis and over the past week he has noticed worsening swelling, redness, warmth and pain with walking. he notes wounds have developed on both of his legs which are draining pus and clear fluid. denies fevers, chills, night sweats, chest pain, sob, abd pain, diarrhea, nausea, vomiting. he has dopplers of his legs every 6 months to r/o dvt, last were negative in january.  No f/c/s.  RN placed DSD awaiting consult.  At wound center they apply Dynaflex wraps.  Pt often takes them off a few days prior to revisit on Fridays.  Pt has Circaids and tries to use them instead.  Pt is homeless and has storage locker where he tries to do his wound care, he doen't have a permanent residence.  Pt walks around all day and hard to keep legs elevated.  He tries the best he can by going to libraries and cafes to keep off feet.    PAST MEDICAL & SURGICAL HISTORY:  Essential hypertension  Chronic venous stasis dermatitis of both lower extremities s/p Laser tx  Lymphedema      REVIEW OF SYSTEMS    Musculoskeletal: chronic lymphedema see HPI  All other systems negative    MEDICATIONS  (STANDING):  BACItracin   Ointment 1Application(s) Topical two times a day  heparin  Injectable 5000Unit(s) SubCutaneous every 8 hours  potassium chloride    Tablet ER 10milliEquivalent(s) Oral daily  furosemide    Tablet 20milliGRAM(s) Oral daily  metoprolol 50milliGRAM(s) Oral two times a day  losartan 100milliGRAM(s) Oral daily  latanoprost 0.005% Ophthalmic Solution 1Drop(s) Both EYES at bedtime  vancomycin  IVPB 1000milliGRAM(s) IV Intermittent every 8 hours  vancomycin  IVPB  IV Intermittent     MEDICATIONS  (PRN):  oxyCODONE  5 mG/acetaminophen 325 mG 2Tablet(s) Oral every 6 hours PRN Severe Pain (7 - 10)  acetaminophen   Tablet 650milliGRAM(s) Oral every 6 hours PRN For Temp greater than 38 C (100.4 F)  acetaminophen   Tablet. 650milliGRAM(s) Oral every 6 hours PRN Moderate Pain (4 - 6)      Allergies    iodinated radiocontrast agents (Hives)      SOCIAL HISTORY:  HOMELESS, Single, denies smoking, drugs, ETOH    FAMILY HISTORY:  Family history of rheumatic heart disease (Father)  Family history of coronary artery disease (Father, Mother)      Vital Signs Last 24 Hrs  T(C): 36.7, Max: 37 (06-11 @ 21:41)  T(F): 98.1, Max: 98.6 (06-11 @ 21:41)  HR: 72 (69 - 72)  BP: 150/74 (150/74 - 154/82)  BP(mean): --  RR: 18 (18 - 20)  SpO2: 96% (96% - 96%)    PHYSICAL EXAM:    Constitutional:  A&Ox3  NAD  MO  (+)Versa CAre P500 bed    Respiratory: CTA    Cardiovascular: RRR    Gastrointestinal:  soft NT/ND (+)BD    Extremities/ Vascular/ Musculoskeletal:  FROM x4;  BLE lymphedema w/ hemosiderin staining  (+) scant serosanguinous drainage w/ serous crusting  BLE w/ multiple moist superficial granular ulcers  Elongated fungal toe nails. Lt great toenail w/ subungle hematoma  No odor, erythema, increased warmth, tenderness, induration, fluctuance    Neurological: sensation and strength grossly intact    Skin: moist w/ good turgor    LABS:  Basic Metabolic Panel in AM (06.10.17 @ 07:44)    Sodium, Serum: 134 mmol/L    Potassium, Serum: 4.0 mmol/L    Chloride, Serum: 104 mmol/L    Carbon Dioxide, Serum: 20 mmol/L    Anion Gap, Serum: 10 mmol/L    Blood Urea Nitrogen, Serum: 14 mg/dL    Creatinine, Serum: 0.97 mg/dL    Glucose, Serum: 145 mg/dL    Calcium, Total Serum: 8.7 mg/dL      Albumin, Serum: 3.4 g/dL (06-09 @ 19:23)  T Protein, Serum:6.8 g/dL (06-09 @ 19:23)  HgA1c  06-10 @ 07:47  6.1      RADIOLOGY & ADDITIONAL STUDIES:  EXAM:  DUPLEX SCAN EXT VEINS LOWER BI                        PROCEDURE DATE:  06/11/2017    IMPRESSION:   No evidence of bilateral lower extremity deep venous thrombosis.

## 2017-06-12 NOTE — PROGRESS NOTE ADULT - SUBJECTIVE AND OBJECTIVE BOX
Patient is a 67y old  Male who presents with a chief complaint of Lower extremity pain, swelling, redness (09 Jun 2017 21:28)      SUBJECTIVE / OVERNIGHT EVENTS: no events Overnight     MEDICATIONS  (STANDING):  BACItracin   Ointment 1Application(s) Topical two times a day  heparin  Injectable 5000Unit(s) SubCutaneous every 8 hours  potassium chloride    Tablet ER 10milliEquivalent(s) Oral daily  furosemide    Tablet 20milliGRAM(s) Oral daily  metoprolol 50milliGRAM(s) Oral two times a day  losartan 100milliGRAM(s) Oral daily  latanoprost 0.005% Ophthalmic Solution 1Drop(s) Both EYES at bedtime  vancomycin  IVPB 1000milliGRAM(s) IV Intermittent every 8 hours  vancomycin  IVPB  IV Intermittent     MEDICATIONS  (PRN):  oxyCODONE  5 mG/acetaminophen 325 mG 2Tablet(s) Oral every 6 hours PRN Severe Pain (7 - 10)  acetaminophen   Tablet 650milliGRAM(s) Oral every 6 hours PRN For Temp greater than 38 C (100.4 F)  acetaminophen   Tablet. 650milliGRAM(s) Oral every 6 hours PRN Moderate Pain (4 - 6)    Vital Signs Last 24 Hrs  T(C): 36.7, Max: 37 (06-11 @ 21:41)  T(F): 98.1, Max: 98.6 (06-11 @ 21:41)  HR: 72 (69 - 72)  BP: 150/74 (150/74 - 154/82)  BP(mean): --  RR: 18 (18 - 20)  SpO2: 96% (96% - 96%)      PHYSICAL EXAM:  GENERAL: NAD, well-developed  HEAD:  Atraumatic, Normocephalic  NECK: Supple, No JVD  CHEST/LUNG: Clear to auscultation bilaterally; No wheeze  HEART: Regular rate and rhythm; No murmurs, rubs, or gallops  ABDOMEN: Soft, Nontender, mildly distended and obese abdomen; Bowel sounds present  EXTREMITIES:  No clubbing, cyanosis. Significant edema 4+ from lymphoedema  PSYCH: AAOx3  NEUROLOGY: non-focal  SKIN: bilateral LE wounds associated with cellulitis. appears stable from yesterday     LABS:                        11.7   8.43  )-----------( 263      ( 10 Joaquin 2017 07:47 )             36.6     06-10    134<L>  |  104  |  14  ----------------------------<  145<H>  4.0   |  20<L>  |  0.97    Ca    8.7      10 Joaquin 2017 07:44    TPro  6.8  /  Alb  3.4  /  TBili  0.3  /  DBili  x   /  AST  14  /  ALT  16  /  AlkPhos  82  06-09    PT/INR - ( 09 Jun 2017 19:23 )   PT: 11.9 sec;   INR: 1.10 ratio         PTT - ( 09 Jun 2017 19:23 )  PTT:51.4 sec

## 2017-06-12 NOTE — CONSULT NOTE ADULT - PROBLEM SELECTOR RECOMMENDATION 2
-no clear cut cellulitis  -afebrile  -normal WBC  -BCX-ve  -local care per Wound care  -DC abx and observe

## 2017-06-12 NOTE — CONSULT NOTE ADULT - SUBJECTIVE AND OBJECTIVE BOX
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 notes he follows with wound care once a week. he has chronic bl le lymphedema and chronic venous stasis and over the past week he has noticed worsening swelling, redness, warmth and pain with walking. he notes wounds have developed on both of his legs which are draining pus and clear fluid. denies fevers, chills, night sweats, chest pain, sob, abd pain, diarrhea, nausea, vomiting. he has dopplers of his legs every 6 months to r/o dvt, last were negative in january.    ED VS: Tmax: 98, BP: 152-160/56-74, P: 75-79, R: 14-16, O2: 98-99% on RA  ED meds: ancef 1g, tdap 0.5mL IM (09 Jun 2017 21:28)      PAST MEDICAL & SURGICAL HISTORY:  Essential hypertension  Chronic venous stasis dermatitis of both lower extremities  Lymphedema  No significant past surgical history      Allergies    iodinated radiocontrast agents (Hives)    Intolerances        ANTIMICROBIALS:  vancomycin  IVPB 1000 every 8 hours  vancomycin  IVPB        MEDICATIONS  (STANDING):  BACItracin   Ointment 1Application(s) Topical two times a day  heparin  Injectable 5000Unit(s) SubCutaneous every 8 hours  potassium chloride    Tablet ER 10milliEquivalent(s) Oral daily  furosemide    Tablet 20milliGRAM(s) Oral daily  metoprolol 50milliGRAM(s) Oral two times a day  losartan 100milliGRAM(s) Oral daily  latanoprost 0.005% Ophthalmic Solution 1Drop(s) Both EYES at bedtime  vancomycin  IVPB 1000milliGRAM(s) IV Intermittent every 8 hours  vancomycin  IVPB  IV Intermittent       Social History  Smoking: no  Etoh: no  Drug use: no  Homeless, walking around all day      FAMILY HISTORY:  Family history of rheumatic heart disease (Father)  Family history of coronary artery disease (Father, Mother)        Vital Signs Last 24 Hrs  T(C): 36.9, Max: 37 (06-11 @ 21:41)  T(F): 98.4, Max: 98.6 (06-11 @ 21:41)  HR: 70 (70 - 72)  BP: 168/85 (150/74 - 168/85)  BP(mean): --  RR: 18 (18 - 18)  SpO2: 97% (96% - 97%)                  MICROBIOLOGY:  .Blood Blood-Peripheral  06-10 @ 22:32   No growth to date.  --  --      .Blood Blood-Peripheral  06-10 @ 17:27   No growth to date.  --  --              Vancomycin Level, Trough: 10.8 ug/mL (06-11 @ 21:58)  v            RADIOLOGY    CXR:    CT HEAD:    CT CHEST:    CT ABDOMEN:    MRI:    OTHER:

## 2017-06-12 NOTE — CONSULT NOTE ADULT - ASSESSMENT
BLE PVD     BLE elevation  ADAPATIC dressings with Compression  con't Nutrition (as tolerated)  con't Offloading   con't Pericare  f/u as outpatient at Wound Center [address] [place] [address] [phone]  Care as per medicine will follow w/ you  Thank you for this consult  Caroline Marie PA-C CWS [id number] BLE PVD w/ Lymphedema    BLE elevation  ADAPATIC dressings with Compression  con't Nutrition (as tolerated)  con't Offloading   con't Pericare  f/u as outpatient at Wound Center [address] [facility] [phone]  Care as per medicine will follow w/ you  Thank you for this consult  Caroline Marie PA-C CWS [id number] BLE PVD w/ Lymphedema    BLE elevation  ADAPATIC dressings with Compression  con't Nutrition (as tolerated)  con't Offloading   con't Pericare  f/u as outpatient at Wound Center [address] [facility] [phone]  Care as per medicine will follow w/ you  D/w team and attng  Thank you for this consult  Caroline Marie PA-C CWS [id number]

## 2017-06-13 LAB — VANCOMYCIN TROUGH SERPL-MCNC: 13.6 UG/ML — SIGNIFICANT CHANGE UP (ref 10–20)

## 2017-06-13 PROCEDURE — 99232 SBSQ HOSP IP/OBS MODERATE 35: CPT

## 2017-06-13 PROCEDURE — 99233 SBSQ HOSP IP/OBS HIGH 50: CPT

## 2017-06-13 RX ADMIN — Medication 650 MILLIGRAM(S): at 05:43

## 2017-06-13 RX ADMIN — Medication 250 MILLIGRAM(S): at 06:31

## 2017-06-13 RX ADMIN — Medication 650 MILLIGRAM(S): at 14:00

## 2017-06-13 RX ADMIN — Medication 50 MILLIGRAM(S): at 05:43

## 2017-06-13 RX ADMIN — Medication 50 MILLIGRAM(S): at 17:38

## 2017-06-13 RX ADMIN — Medication 650 MILLIGRAM(S): at 13:00

## 2017-06-13 RX ADMIN — Medication 1 APPLICATION(S): at 17:38

## 2017-06-13 RX ADMIN — HEPARIN SODIUM 5000 UNIT(S): 5000 INJECTION INTRAVENOUS; SUBCUTANEOUS at 12:40

## 2017-06-13 RX ADMIN — Medication 1 APPLICATION(S): at 12:35

## 2017-06-13 RX ADMIN — LOSARTAN POTASSIUM 100 MILLIGRAM(S): 100 TABLET, FILM COATED ORAL at 05:43

## 2017-06-13 RX ADMIN — Medication 650 MILLIGRAM(S): at 06:15

## 2017-06-13 RX ADMIN — Medication 20 MILLIGRAM(S): at 05:43

## 2017-06-13 RX ADMIN — LATANOPROST 1 DROP(S): 0.05 SOLUTION/ DROPS OPHTHALMIC; TOPICAL at 22:33

## 2017-06-13 RX ADMIN — Medication 250 MILLIGRAM(S): at 12:40

## 2017-06-13 RX ADMIN — Medication 10 MILLIEQUIVALENT(S): at 12:40

## 2017-06-13 NOTE — PHYSICAL THERAPY INITIAL EVALUATION ADULT - PRECAUTIONS/LIMITATIONS, REHAB EVAL
fall precautions/Pt denies fevers, chills, night sweats, chest pain, sob, abd pain, diarrhea, nausea, vomiting. He has dopplers of his legs every 6mo to r/o dvt, last were negative in January.

## 2017-06-13 NOTE — PROGRESS NOTE ADULT - SUBJECTIVE AND OBJECTIVE BOX
ROGERS SILVA 67y MRN-[id number]    Patient is a 67y old  Male who presents with a chief complaint of Lower extremity pain, swelling, redness (09 Jun 2017 21:28)      Follow Up/CC:  LE wounds    Interval History/ROS: LE pain earlier but better    Allergies    iodinated radiocontrast agents (Hives)    Intolerances        ANTIMICROBIALS:  vancomycin  IVPB 1000 every 8 hours  vancomycin  IVPB        MEDICATIONS  (STANDING):  BACItracin   Ointment 1Application(s) Topical two times a day  heparin  Injectable 5000Unit(s) SubCutaneous every 8 hours  potassium chloride    Tablet ER 10milliEquivalent(s) Oral daily  furosemide    Tablet 20milliGRAM(s) Oral daily  metoprolol 50milliGRAM(s) Oral two times a day  losartan 100milliGRAM(s) Oral daily  latanoprost 0.005% Ophthalmic Solution 1Drop(s) Both EYES at bedtime  vancomycin  IVPB 1000milliGRAM(s) IV Intermittent every 8 hours  vancomycin  IVPB  IV Intermittent     MEDICATIONS  (PRN):  oxyCODONE  5 mG/acetaminophen 325 mG 2Tablet(s) Oral every 6 hours PRN Severe Pain (7 - 10)  acetaminophen   Tablet 650milliGRAM(s) Oral every 6 hours PRN For Temp greater than 38 C (100.4 F)  acetaminophen   Tablet. 650milliGRAM(s) Oral every 6 hours PRN Moderate Pain (4 - 6)        Vital Signs Last 24 Hrs  T(C): 36.7, Max: 36.9 (06-12 @ 13:53)  T(F): 98.1, Max: 98.4 (06-12 @ 13:53)  HR: 67 (66 - 80)  BP: 166/82 (150/76 - 168/85)  BP(mean): --  RR: 18 (18 - 18)  SpO2: 95% (95% - 97%)                  MICROBIOLOGY:  .Blood Blood-Peripheral  06-10-17   No growth to date.  --  --      .Blood Blood-Peripheral  06-10-17   No growth to date.  --  --              Vancomycin Level, Trough: 13.6 ug/mL (06-13 @ 06:01)  v            RADIOLOGY    CXR:    CT HEAD:    CT CHEST:    CT ABDOMEN:    MRI:    OTHER:

## 2017-06-13 NOTE — PROGRESS NOTE ADULT - ATTENDING COMMENTS
Keon Garcia  Division of Infectious Disease  Pager [phone]  After 5pm/weekend #[phone]    Will sign off, recall ID if needed #[phone].

## 2017-06-13 NOTE — PHYSICAL THERAPY INITIAL EVALUATION ADULT - ACTIVE RANGE OF MOTION EXAMINATION, REHAB EVAL
noted increased edema to B LE, R >L/bilateral  lower extremity Active ROM was WFL (within functional limits)/bilateral upper extremity Active ROM was WFL (within functional limits)

## 2017-06-13 NOTE — PHYSICAL THERAPY INITIAL EVALUATION ADULT - GAIT DEVIATIONS NOTED, PT EVAL
decreased crescencio/decreased stride length/increased lateral sway, intermittent holding on to walls for support, continued to decline PT support or use of RW/decreased step length

## 2017-06-13 NOTE — PHYSICAL THERAPY INITIAL EVALUATION ADULT - PERTINENT HX OF CURRENT PROBLEM, REHAB EVAL
67yoM hx htn, lymphedema, chronic venous stasis (f/w wound care) p/w worsening swelling, pain, weeping from B LE. Notes he follows w/ wound care 1x/wk. Pt has chronic B LE lymphedema & chronic venous stasis, over the past week he has noticed worsening swelling, redness, warmth & pain w/ walking. Notes wounds have developed on B LE which are draining pus & clear fluid

## 2017-06-13 NOTE — PHYSICAL THERAPY INITIAL EVALUATION ADULT - ADDITIONAL COMMENTS
Pt notes that PTA he was homeless & living/functioning independently on the streets w/o ad assistive device. Pt is against going into a homeless shelter.

## 2017-06-13 NOTE — PHYSICAL THERAPY INITIAL EVALUATION ADULT - MODALITIES TREATMENT COMMENTS
Pt rec'ed supine in bed, call bell in reach, +DSD to B LE. PT WC recommendation for Multi-layer wrapping rec'ed. At this time pt politely declining multi-layer wrapping, notes that he feels trisha is the best dressing for B LE at this time. DSD removed, noted multiple chronic ulcers to B LE, B LE w/ increased edema (R>L), +pedal pulses. Pt declined cleansing B LE w/ CHG scrub or NS, agreeable to sween cream application to B LE. Pt declined adaptic touch & gauze to cover ulcers, only agreeable to trisha secured w/ tape w/ tubular stockinette. RN/Mikel made aware of dressing placed on pt's B LE 2/2 pt preference. WC/PA Lynne Marie also made aware. Pt then agreeable to assessment of functional mobility.

## 2017-06-13 NOTE — CONSULT NOTE ADULT - SUBJECTIVE AND OBJECTIVE BOX
pt seen at bedside in NAD. no dressings to feet  pedal pulses weakly palpable. TG WNL, CFT 3 sec x 10  epicritic sensation intact  no open lesions to b/l feet  edema secondary to lymphedema and venous statsis  no erythema no active signs of infection  nails short at this time  thank you for consult please reconsult PRN   pt can follow up as outpt in Wound care clinic

## 2017-06-13 NOTE — PROGRESS NOTE ADULT - SUBJECTIVE AND OBJECTIVE BOX
Patient is a 67y old  Male who presents with a chief complaint of Lower extremity pain, swelling, redness (09 Jun 2017 21:28)      SUBJECTIVE / OVERNIGHT EVENTS: no events Overnight     MEDICATIONS  (STANDING):  BACItracin   Ointment 1Application(s) Topical two times a day  heparin  Injectable 5000Unit(s) SubCutaneous every 8 hours  potassium chloride    Tablet ER 10milliEquivalent(s) Oral daily  furosemide    Tablet 20milliGRAM(s) Oral daily  metoprolol 50milliGRAM(s) Oral two times a day  losartan 100milliGRAM(s) Oral daily  latanoprost 0.005% Ophthalmic Solution 1Drop(s) Both EYES at bedtime  vancomycin  IVPB 1000milliGRAM(s) IV Intermittent every 8 hours  vancomycin  IVPB  IV Intermittent     MEDICATIONS  (PRN):  oxyCODONE  5 mG/acetaminophen 325 mG 2Tablet(s) Oral every 6 hours PRN Severe Pain (7 - 10)  acetaminophen   Tablet 650milliGRAM(s) Oral every 6 hours PRN For Temp greater than 38 C (100.4 F)  acetaminophen   Tablet. 650milliGRAM(s) Oral every 6 hours PRN Moderate Pain (4 - 6)    Vital Signs Last 24 Hrs  T(C): 36.7, Max: 36.9 (06-12 @ 13:53)  T(F): 98.1, Max: 98.4 (06-12 @ 13:53)  HR: 67 (66 - 80)  BP: 166/82 (150/76 - 168/85)  BP(mean): --  RR: 18 (18 - 18)  SpO2: 95% (95% - 97%)    PHYSICAL EXAM:  GENERAL: NAD, well-developed  HEAD:  Atraumatic, Normocephalic  NECK: Supple, No JVD  CHEST/LUNG: Clear to auscultation bilaterally; No wheeze  HEART: Regular rate and rhythm; No murmurs, rubs, or gallops  ABDOMEN: Soft, Nontender, mildly distended and obese abdomen; Bowel sounds present  EXTREMITIES:  Significant edema 4+ from lymphoedema  PSYCH: AAOx3  NEUROLOGY: non-focal  SKIN: bilateral LE wounds        No new labs

## 2017-06-14 ENCOUNTER — TRANSCRIPTION ENCOUNTER (OUTPATIENT)
Age: 67
End: 2017-06-14

## 2017-06-14 PROCEDURE — 99233 SBSQ HOSP IP/OBS HIGH 50: CPT

## 2017-06-14 RX ADMIN — Medication 50 MILLIGRAM(S): at 07:58

## 2017-06-14 RX ADMIN — Medication 1 APPLICATION(S): at 18:53

## 2017-06-14 RX ADMIN — Medication 10 MILLIEQUIVALENT(S): at 12:57

## 2017-06-14 RX ADMIN — Medication 650 MILLIGRAM(S): at 07:53

## 2017-06-14 RX ADMIN — HEPARIN SODIUM 5000 UNIT(S): 5000 INJECTION INTRAVENOUS; SUBCUTANEOUS at 05:22

## 2017-06-14 RX ADMIN — HEPARIN SODIUM 5000 UNIT(S): 5000 INJECTION INTRAVENOUS; SUBCUTANEOUS at 13:50

## 2017-06-14 RX ADMIN — Medication 650 MILLIGRAM(S): at 17:47

## 2017-06-14 RX ADMIN — LOSARTAN POTASSIUM 100 MILLIGRAM(S): 100 TABLET, FILM COATED ORAL at 05:21

## 2017-06-14 RX ADMIN — Medication 1 APPLICATION(S): at 05:22

## 2017-06-14 RX ADMIN — Medication 650 MILLIGRAM(S): at 02:23

## 2017-06-14 RX ADMIN — Medication 50 MILLIGRAM(S): at 18:53

## 2017-06-14 RX ADMIN — LATANOPROST 1 DROP(S): 0.05 SOLUTION/ DROPS OPHTHALMIC; TOPICAL at 21:54

## 2017-06-14 NOTE — DISCHARGE NOTE ADULT - MEDICATION SUMMARY - MEDICATIONS TO STOP TAKING
I will STOP taking the medications listed below when I get home from the hospital:    Lasix  --  by mouth    hydrALAZINE  --  by mouth    Lasix 20 mg oral tablet  -- 1 tab(s) by mouth once a day    hydrALAZINE 100 mg oral tablet  -- 1 tab(s) by mouth 2 times a day

## 2017-06-14 NOTE — DISCHARGE NOTE ADULT - HOSPITAL COURSE
To be completed by attending physician 66yo Homeless man with morbid obesity (BMI 40.7), HTN, HLD, chronic leg lymphedema, chronic venous stasis (follows at [facility] on [address]), glaucoma, is sent from the wound care center for treatment of bilateral leg cellulitis.   Patient was seen and followed by ID and Wound care. ID recommend to monitor patient off antibiotic as Cellulitis from chronic venous stasis and Lymphedema.   Patient declined rehab, opted to go to his storage unit. transport arranged by social work. Patient to follow up with Dr. Loo at wound care clinic for legs Dynaflex dressing change and ongoing wound management, primary care provider, and community .

## 2017-06-14 NOTE — DISCHARGE NOTE ADULT - ADDITIONAL INSTRUCTIONS
wound care: Wound Specialist Recommendations  : recommend the followin. BLE: gentle daily cleansing and apply Sween 24 moisturizer to dry skin.2. BLE: Adaptic dressings to open wounds follow with gauze and wrap with trisha- change secondary dressing daily- Adaptic may stay in place x 5 days.3. BLE: follow with Ace wraps.4. continue to encourage leg elevation5. Pt may f/u at the outpatient wound center Keep Dynaflex clean, dry  and intact.   You have appointment with Dr. Loo on Friday, 6/23 at 08:30. Please follow up for dressing change and wound managment  Follow up with your primary care provider within 5-7 days after discharge  Make appointments to follow up with your out patient physicians.  Bring all discharge paperwork including discharge medication list to your follow up appointments.

## 2017-06-14 NOTE — DISCHARGE NOTE ADULT - MEDICATION SUMMARY - MEDICATIONS TO TAKE
I will START or STAY ON the medications listed below when I get home from the hospital:    acetaminophen 325 mg oral tablet  -- 2 tab(s) by mouth every 6 hours, As needed, Moderate Pain (4 - 6)  -- Indication: For Pain    losartan 100 mg oral tablet  -- 1 tab(s) by mouth once a day  -- Indication: For Blood pessure control    metoprolol tartrate 50 mg oral tablet  -- 1 tab(s) by mouth 2 times a day  -- Indication: For Blood pressure control    hydroCHLOROthiazide 25 mg oral tablet  -- 1 tab(s) by mouth once a day  -- Avoid prolonged or excessive exposure to direct and/or artificial sunlight while taking this medication.  It is very important that you take or use this exactly as directed.  Do not skip doses or discontinue unless directed by your doctor.  It may be advisable to drink a full glass orange juice or eat a banana daily while taking this medication.  Take with food or milk.    -- Indication: For Blood pressure control    potassium chloride 10 mEq oral capsule, extended release  -- 1 cap(s) by mouth once a day  -- Indication: For Potassium Supplement     latanoprost 0.005% ophthalmic solution  -- 1 drop(s) to each affected eye once a day (in the evening)  -- Indication: For Glaucoma    Vitamin D3 5000 intl units oral capsule  -- 1 cap(s) by mouth once a day  -- Indication: For Supplement

## 2017-06-14 NOTE — DISCHARGE NOTE ADULT - CARE PROVIDER_API CALL
Colby Loo), Surgery  [address] [place] [address]  Phone: [phone]  Fax: [phone]    vilsaint-Antoine, Danielle  [facility]   [address] [place] [address]  Phone: [phone]  Fax: (   )    -

## 2017-06-14 NOTE — DISCHARGE NOTE ADULT - PLAN OF CARE
wound care- lower extremity elevation when sitting  ADAPATIC dressings with Compression  f/u as outpatient at Wound Center [address] [facility] [phone] continue current medication continue current regimen follow up with wound care clinic  leg elevation Prevention of cellulitis. Follow up at wound care clinic Keep Dynaflex clean, dry  and intact.   Follow up with Dr. Loo Next Friday 6/23 at 8:30 am at Wound Center [address] [place] [address] [phone] continue current medication  Please follow up with your primary care physician continue Latanoprost eye drop as directed  Follow up with your primary care physician Follow up with Dr. Loo at wound care clinic    leg elevation

## 2017-06-14 NOTE — DISCHARGE NOTE ADULT - CARE PLAN
Principal Discharge DX:	Cellulitis of lower extremity  Instructions for follow-up, activity and diet:	wound care- lower extremity elevation when sitting  ADAPATIC dressings with Compression  f/[facility] at [facility] [address] [place] [address] [phone]  Secondary Diagnosis:	Essential hypertension  Instructions for follow-up, activity and diet:	continue current medication  Secondary Diagnosis:	Glaucoma  Instructions for follow-up, activity and diet:	continue current regimen  Secondary Diagnosis:	Lymphedema  Instructions for follow-up, activity and diet:	follow up with wound care clinic  leg elevation Principal Discharge DX:	Cellulitis of lower extremity  Instructions for follow-up, activity and diet:	wound care- lower extremity elevation when sitting  ADAPATIC dressings with Compression  f/u [facility] outpatient at [facility] [address] [place] [address] [phone]  Secondary Diagnosis:	Essential hypertension  Instructions for follow-up, activity and diet:	continue current medication  Secondary Diagnosis:	Glaucoma  Instructions for follow-up, activity and diet:	continue current regimen  Secondary Diagnosis:	Lymphedema  Instructions for follow-up, activity and diet:	follow up with wound care clinic  leg elevation Principal Discharge DX:	Cellulitis of lower extremity  Goal:	Prevention of cellulitis. Follow up at wound care clinic  Instructions for follow-up, activity and diet:	Keep Dynaflex clean, dry  and intact.   Follow up with Dr. Loo Next Friday 6/23 at 8:30 am at [facility] [address] [place] [address] [phone]  Secondary Diagnosis:	Essential hypertension  Instructions for follow-up, activity and diet:	continue current medication  Please follow up with your primary care physician  Secondary Diagnosis:	Glaucoma  Instructions for follow-up, activity and diet:	continue Latanoprost eye drop as directed  Follow up with your primary care physician  Secondary Diagnosis:	Lymphedema  Instructions for follow-up, activity and diet:	Follow up with Dr. Loo at wound care clinic    leg elevation Principal Discharge DX:	Cellulitis of lower extremity  Goal:	Prevention of cellulitis. Follow up at wound care clinic  Instructions for follow-up, activity and diet:	Keep Dynaflex clean, dry  and intact.   Follow up with Dr. Loo Next Friday 6/23 at 8:30 am at [facility] [address] [place] [address] [phone]  Secondary Diagnosis:	Essential hypertension  Instructions for follow-up, activity and diet:	continue current medication  Please follow up with your primary care physician  Secondary Diagnosis:	Glaucoma  Instructions for follow-up, activity and diet:	continue Latanoprost eye drop as directed  Follow up with your primary care physician  Secondary Diagnosis:	Lymphedema  Instructions for follow-up, activity and diet:	Follow up with Dr. Loo at wound care clinic    leg elevation Principal Discharge DX:	Cellulitis of lower extremity  Goal:	Prevention of cellulitis. Follow up at wound care clinic  Instructions for follow-up, activity and diet:	Keep Dynaflex clean, dry  and intact.   Follow up with Dr. Loo Next Friday 6/23 at 8:30 am at [facility] [address] [place] [address] [phone]  Secondary Diagnosis:	Essential hypertension  Instructions for follow-up, activity and diet:	continue current medication  Please follow up with your primary care physician  Secondary Diagnosis:	Glaucoma  Instructions for follow-up, activity and diet:	continue Latanoprost eye drop as directed  Follow up with your primary care physician  Secondary Diagnosis:	Lymphedema  Instructions for follow-up, activity and diet:	Follow up with Dr. Loo at wound care clinic    leg elevation Principal Discharge DX:	Cellulitis of lower extremity  Goal:	Prevention of cellulitis. Follow up at wound care clinic  Instructions for follow-up, activity and diet:	Keep Dynaflex clean, dry  and intact.   Follow up with Dr. Loo Next Friday 6/23 at 8:30 am at [facility] [address] [place] [address] [phone]  Secondary Diagnosis:	Essential hypertension  Instructions for follow-up, activity and diet:	continue current medication  Please follow up with your primary care physician  Secondary Diagnosis:	Glaucoma  Instructions for follow-up, activity and diet:	continue Latanoprost eye drop as directed  Follow up with your primary care physician  Secondary Diagnosis:	Lymphedema  Instructions for follow-up, activity and diet:	Follow up with Dr. Loo at wound care clinic    leg elevation

## 2017-06-14 NOTE — PROGRESS NOTE ADULT - SUBJECTIVE AND OBJECTIVE BOX
Patient is a 67y old  Male who presents with a chief complaint of Lower extremity pain, swelling, redness    SUBJECTIVE / OVERNIGHT EVENTS: no events Overnight     MEDICATIONS  (STANDING):  BACItracin   Ointment 1Application(s) Topical two times a day  heparin  Injectable 5000Unit(s) SubCutaneous every 8 hours  potassium chloride    Tablet ER 10milliEquivalent(s) Oral daily  metoprolol 50milliGRAM(s) Oral two times a day  losartan 100milliGRAM(s) Oral daily  latanoprost 0.005% Ophthalmic Solution 1Drop(s) Both EYES at bedtime  hydrochlorothiazide    Capsule 12.5milliGRAM(s) Oral daily    MEDICATIONS  (PRN):  oxyCODONE  5 mG/acetaminophen 325 mG 2Tablet(s) Oral every 6 hours PRN Severe Pain (7 - 10)  acetaminophen   Tablet 650milliGRAM(s) Oral every 6 hours PRN For Temp greater than 38 C (100.4 F)  acetaminophen   Tablet. 650milliGRAM(s) Oral every 6 hours PRN Moderate Pain (4 - 6)    Vital Signs Last 24 Hrs  T(C): 36.8, Max: 36.8 (06-14 @ 13:46)  T(F): 98.3, Max: 98.3 (06-14 @ 13:46)  HR: 62 (54 - 65)  BP: 161/91 (141/79 - 177/91)  BP(mean): --  RR: 18 (18 - 18)  SpO2: 96% (96% - 96%)    PHYSICAL EXAM:  GENERAL: NAD, well-developed  HEAD:  Atraumatic, Normocephalic  NECK: Supple, No JVD  CHEST/LUNG: Clear to auscultation bilaterally; No wheeze  HEART: Regular rate and rhythm  ABDOMEN: Soft, Nontender, mildly distended and obese abdomen; Bowel sounds present  EXTREMITIES:  Significant edema 4+ from lymphoedema  PSYCH: AAOx3  NEUROLOGY: non-focal  SKIN: bilateral LE wounds      No new labs       No new labs

## 2017-06-14 NOTE — DISCHARGE NOTE ADULT - PATIENT PORTAL LINK FT
“You can access the FollowHealth Patient Portal, offered by [facility], by registering with the following website: [email or web address]”

## 2017-06-14 NOTE — DISCHARGE NOTE ADULT - PROVIDER TOKENS
TOKEN:'[id number]',FREE:[LAST:[vilsaint-Antoine],FIRST:[Annita],PHONE:[phone]],FAX:[(   )    -],ADDRESS:[[facility]   [address] [id number]]]

## 2017-06-15 LAB
CULTURE RESULTS: SIGNIFICANT CHANGE UP
CULTURE RESULTS: SIGNIFICANT CHANGE UP
SPECIMEN SOURCE: SIGNIFICANT CHANGE UP
SPECIMEN SOURCE: SIGNIFICANT CHANGE UP

## 2017-06-15 PROCEDURE — 99233 SBSQ HOSP IP/OBS HIGH 50: CPT

## 2017-06-15 RX ADMIN — HEPARIN SODIUM 5000 UNIT(S): 5000 INJECTION INTRAVENOUS; SUBCUTANEOUS at 13:41

## 2017-06-15 RX ADMIN — Medication 50 MILLIGRAM(S): at 05:56

## 2017-06-15 RX ADMIN — LOSARTAN POTASSIUM 100 MILLIGRAM(S): 100 TABLET, FILM COATED ORAL at 05:56

## 2017-06-15 RX ADMIN — Medication 1 APPLICATION(S): at 18:15

## 2017-06-15 RX ADMIN — Medication 10 MILLIEQUIVALENT(S): at 13:41

## 2017-06-15 RX ADMIN — LATANOPROST 1 DROP(S): 0.05 SOLUTION/ DROPS OPHTHALMIC; TOPICAL at 22:10

## 2017-06-15 RX ADMIN — Medication 1 APPLICATION(S): at 05:56

## 2017-06-15 RX ADMIN — Medication 50 MILLIGRAM(S): at 18:36

## 2017-06-15 NOTE — PROGRESS NOTE ADULT - SUBJECTIVE AND OBJECTIVE BOX
65 yo male, known from office admitted with unmanageable swelling of both lower legs with wounds. Is homeless.  Since admission has had abx Rx and hydralazine has been d/c ed. Able to sleep at night, and not be ambulatory 24/7.  Both legs are currently markedly less edematous and bilat superficial lower leg wounds are healing. Venous duplex is neg for DVT  Bilat Dynaflex applied and social status again discussed. Patient remains appreciative, but his ability to comply with recommendations is pending.

## 2017-06-15 NOTE — PROGRESS NOTE ADULT - SUBJECTIVE AND OBJECTIVE BOX
Patient is a 67y old  Male who presents with a chief complaint of Lower extremity pain, swelling, redness    SUBJECTIVE / OVERNIGHT EVENTS: no events Overnight     MEDICATIONS  (STANDING):  BACItracin   Ointment 1Application(s) Topical two times a day  heparin  Injectable 5000Unit(s) SubCutaneous every 8 hours  potassium chloride    Tablet ER 10milliEquivalent(s) Oral daily  metoprolol 50milliGRAM(s) Oral two times a day  losartan 100milliGRAM(s) Oral daily  latanoprost 0.005% Ophthalmic Solution 1Drop(s) Both EYES at bedtime  hydrochlorothiazide    Capsule 25milliGRAM(s) Oral daily    MEDICATIONS  (PRN):  oxyCODONE  5 mG/acetaminophen 325 mG 2Tablet(s) Oral every 6 hours PRN Severe Pain (7 - 10)  acetaminophen   Tablet 650milliGRAM(s) Oral every 6 hours PRN For Temp greater than 38 C (100.4 F)  acetaminophen   Tablet. 650milliGRAM(s) Oral every 6 hours PRN Moderate Pain (4 - 6)    T(C): 36.7, Max: 36.9 (06-15 @ 05:51)  HR: 65 (65 - 66)  BP: 168/76 (147/90 - 168/76)  RR: 18 (18 - 18)  SpO2: 95% (93% - 95%)  Wt(kg): --    PHYSICAL EXAM:  GENERAL: NAD, well-developed  HEAD:  Atraumatic, Normocephalic  NECK: Supple, No JVD  CHEST/LUNG: Clear to auscultation bilaterally; No wheeze  HEART: Regular rate and rhythm  ABDOMEN: Soft, Nontender, mildly distended and obese abdomen; Bowel sounds present  EXTREMITIES:  Significant edema 4+ from lymphoedema  PSYCH: AAOx3  NEUROLOGY: non-focal  SKIN: bilateral LE wounds      No new labs       No new labs

## 2017-06-15 NOTE — PROVIDER CONTACT NOTE (OTHER) - BACKGROUND
Hx of HTN. Pt previously on Hydralazine at home but was d/c in hospital for edema. Pt on hydrochlorothiazide, metoprolol and losartan for HTN medications

## 2017-06-16 ENCOUNTER — APPOINTMENT (OUTPATIENT)
Dept: WOUND CARE | Facility: CLINIC | Age: 67
End: 2017-06-16

## 2017-06-16 VITALS
HEART RATE: 56 BPM | OXYGEN SATURATION: 96 % | TEMPERATURE: 98 F | DIASTOLIC BLOOD PRESSURE: 77 MMHG | SYSTOLIC BLOOD PRESSURE: 147 MMHG | RESPIRATION RATE: 18 BRPM

## 2017-06-16 LAB
ANION GAP SERPL CALC-SCNC: 17 MMOL/L — SIGNIFICANT CHANGE UP (ref 5–17)
BUN SERPL-MCNC: 16 MG/DL — SIGNIFICANT CHANGE UP (ref 7–23)
CALCIUM SERPL-MCNC: 9.4 MG/DL — SIGNIFICANT CHANGE UP (ref 8.4–10.5)
CHLORIDE SERPL-SCNC: 97 MMOL/L — SIGNIFICANT CHANGE UP (ref 96–108)
CO2 SERPL-SCNC: 22 MMOL/L — SIGNIFICANT CHANGE UP (ref 22–31)
CREAT SERPL-MCNC: 0.97 MG/DL — SIGNIFICANT CHANGE UP (ref 0.5–1.3)
GLUCOSE SERPL-MCNC: 127 MG/DL — HIGH (ref 70–99)
POTASSIUM SERPL-MCNC: 4.3 MMOL/L — SIGNIFICANT CHANGE UP (ref 3.5–5.3)
POTASSIUM SERPL-SCNC: 4.3 MMOL/L — SIGNIFICANT CHANGE UP (ref 3.5–5.3)
SODIUM SERPL-SCNC: 136 MMOL/L — SIGNIFICANT CHANGE UP (ref 135–145)

## 2017-06-16 PROCEDURE — 97162 PT EVAL MOD COMPLEX 30 MIN: CPT

## 2017-06-16 PROCEDURE — 80048 BASIC METABOLIC PNL TOTAL CA: CPT

## 2017-06-16 PROCEDURE — 84132 ASSAY OF SERUM POTASSIUM: CPT

## 2017-06-16 PROCEDURE — 87040 BLOOD CULTURE FOR BACTERIA: CPT

## 2017-06-16 PROCEDURE — 83880 ASSAY OF NATRIURETIC PEPTIDE: CPT

## 2017-06-16 PROCEDURE — 82947 ASSAY GLUCOSE BLOOD QUANT: CPT

## 2017-06-16 PROCEDURE — 85730 THROMBOPLASTIN TIME PARTIAL: CPT

## 2017-06-16 PROCEDURE — 83036 HEMOGLOBIN GLYCOSYLATED A1C: CPT

## 2017-06-16 PROCEDURE — 85014 HEMATOCRIT: CPT

## 2017-06-16 PROCEDURE — 90715 TDAP VACCINE 7 YRS/> IM: CPT

## 2017-06-16 PROCEDURE — 80053 COMPREHEN METABOLIC PANEL: CPT

## 2017-06-16 PROCEDURE — 84443 ASSAY THYROID STIM HORMONE: CPT

## 2017-06-16 PROCEDURE — 80202 ASSAY OF VANCOMYCIN: CPT

## 2017-06-16 PROCEDURE — 85027 COMPLETE CBC AUTOMATED: CPT

## 2017-06-16 PROCEDURE — 84295 ASSAY OF SERUM SODIUM: CPT

## 2017-06-16 PROCEDURE — 83605 ASSAY OF LACTIC ACID: CPT

## 2017-06-16 PROCEDURE — 85610 PROTHROMBIN TIME: CPT

## 2017-06-16 PROCEDURE — 82330 ASSAY OF CALCIUM: CPT

## 2017-06-16 PROCEDURE — 82803 BLOOD GASES ANY COMBINATION: CPT

## 2017-06-16 PROCEDURE — 93970 EXTREMITY STUDY: CPT

## 2017-06-16 PROCEDURE — 99239 HOSP IP/OBS DSCHRG MGMT >30: CPT

## 2017-06-16 PROCEDURE — 99285 EMERGENCY DEPT VISIT HI MDM: CPT

## 2017-06-16 PROCEDURE — 80061 LIPID PANEL: CPT

## 2017-06-16 PROCEDURE — 82435 ASSAY OF BLOOD CHLORIDE: CPT

## 2017-06-16 PROCEDURE — 93005 ELECTROCARDIOGRAM TRACING: CPT

## 2017-06-16 RX ORDER — ACETAMINOPHEN 500 MG
2 TABLET ORAL
Qty: 0 | Refills: 0 | COMMUNITY
Start: 2017-06-16

## 2017-06-16 RX ADMIN — Medication 10 MILLIEQUIVALENT(S): at 11:34

## 2017-06-16 RX ADMIN — Medication 50 MILLIGRAM(S): at 06:03

## 2017-06-16 RX ADMIN — Medication 1 APPLICATION(S): at 06:02

## 2017-06-16 RX ADMIN — LOSARTAN POTASSIUM 100 MILLIGRAM(S): 100 TABLET, FILM COATED ORAL at 06:03

## 2017-06-16 RX ADMIN — Medication 650 MILLIGRAM(S): at 10:40

## 2017-06-16 RX ADMIN — Medication 650 MILLIGRAM(S): at 09:48

## 2017-06-16 NOTE — PROGRESS NOTE ADULT - PROBLEM SELECTOR PLAN 2
from Chronic venous stasis, monitor off antibiotics as per ID from Chronic venous stasis, stable off antibiotics.

## 2017-06-16 NOTE — PROGRESS NOTE ADULT - PROVIDER SPECIALTY LIST ADULT
Infectious Disease
Internal Medicine
Wound Care
Internal Medicine

## 2017-06-16 NOTE — PROGRESS NOTE ADULT - ASSESSMENT
66yo Homeless man with morbid obesity (BMI 40.7), HTN, HLD, chronic leg lymphedema, chronic venous stasis (follows at [facility] on [address]), glaucoma, is sent from the wound care center for treatment of bilateral leg cellulitis.
66yo Homeless man with morbid obesity (BMI 40.7), HTN, HLD, chronic leg lymphedema, chronic venous stasis (follows at [facility] on [address]), glaucoma, is sent from the wound care center for treatment of bilateral leg cellulitis.
66yo Homeless man with morbid obesity (BMI 40.7), HTN, HLD, chronic leg lymphedema, chronic venous stasis (follows at [facility] on [address]), glaucoma, is sent from the wound care center for treatment of bilateral leg cellulitis.
66yo Homeless man with morbid obesity (BMI 40.7), HTN, HLD, chronic leg lymphedema, chronic venous stasis (follows at [facility] on [address]), glaucoma, is sent from the wound care center for treatment of bilateral leg cellulitis.
68yo Homeless man with morbid obesity (BMI 40.7), HTN, HLD, chronic leg lymphedema, chronic venous stasis (follows at [facility] on [address]), glaucoma, is sent from the wound care center for treatment of bilateral leg cellulitis.
68yo Homeless man with morbid obesity (BMI 40.7), HTN, HLD, chronic leg lymphedema, chronic venous stasis (follows at [facility] on [address]), glaucoma, is sent from the wound care center for treatment of bilateral leg cellulitis.
68yo Homeless man with morbid obesity (BMI 40.7), HTN, HLD, chronic leg lymphedema, chronic venous stasis (follows at [facility] on [address]), glaucoma, is sent from the wound care center for treatment of bilateral leg cellulitis.
68 y/o male with B/L LE wounds and LE edema

## 2017-06-16 NOTE — PROGRESS NOTE ADULT - PROBLEM SELECTOR PLAN 4
continue latanoprost qHS drops (home medication)

## 2017-06-16 NOTE — PROGRESS NOTE ADULT - PROBLEM SELECTOR PROBLEM 1
Cellulitis of lower extremity, unspecified laterality
Lymphedema
Cellulitis of lower extremity, unspecified laterality
Bilateral edema of lower extremity

## 2017-06-16 NOTE — PROGRESS NOTE ADULT - PROBLEM SELECTOR PLAN 1
Wound care consulted.   Recommend wound care dressings , [facility] daily. Wound care consulted.   Recommend wound care dressings , [facility] daily.  D/C planning today, patient preferred to get discharged to [facility] over [facility].

## 2017-06-16 NOTE — PROGRESS NOTE ADULT - PROBLEM SELECTOR PLAN 3
Blood pressure increased a little with the stopping of hydralazine (may add to LE edema).   Continue metoprolol 50mg PO BID, losartan 100mg PO daily
Continue metoprolol 50mg PO BID, losartan 100mg PO daily
Continue metoprolol 50mg PO BID, losartan 100mg PO daily
Continue metoprolol 50mg PO BID, losartan 100mg PO daily.  Increased HCTZ to 25 mg daily.
Continue metoprolol 50mg PO BID, losartan 100mg PO daily.  Increased HCTZ to 25 mg daily.
Monitor blood pressure. Will stop hydralazine for now as it may cause LE edema.  Continue metoprolol 50mg PO BID, losartan 100mg PO daily  Check ECG (screening for heart disease at admission)
Blood pressure increased a little with the stopping of hydralazine (may add to LE edema).   Continue metoprolol 50mg PO BID, losartan 100mg PO daily

## 2017-06-16 NOTE — CHART NOTE - NSCHARTNOTEFT_GEN_A_CORE
66y/o male with PMHx of htn, lymphedema, chronic venous stasis (f/w wound care), homeless p/w leg cellulitis and lymphedema. Evaluated by wound care. wound improved.  Discharge facilitated after discussion with Dr. Vargas who has cleared pt for discharge. pt declines rehab, rather go to his storage unit. transport arranged by social work. patient to follow up with Dr. Loo at wound care clinic for legs Dynaflex dressing change and ongoing wound management, primary care provider, and community .     Fior Vang NP-c   [id number]

## 2017-06-16 NOTE — PROGRESS NOTE ADULT - PROBLEM SELECTOR PROBLEM 2
Cellulitis of lower extremity, unspecified laterality
Lymphedema
Ulcer of lower extremity, unspecified laterality, limited to breakdown of skin

## 2017-06-16 NOTE — PROGRESS NOTE ADULT - SUBJECTIVE AND OBJECTIVE BOX
Patient is a 67y old  Male who presents with a chief complaint of Lower extremity pain, swelling, redness    SUBJECTIVE / OVERNIGHT EVENTS: no events Overnight     MEDICATIONS  (STANDING):  BACItracin   Ointment 1Application(s) Topical two times a day  heparin  Injectable 5000Unit(s) SubCutaneous every 8 hours  potassium chloride    Tablet ER 10milliEquivalent(s) Oral daily  metoprolol 50milliGRAM(s) Oral two times a day  losartan 100milliGRAM(s) Oral daily  latanoprost 0.005% Ophthalmic Solution 1Drop(s) Both EYES at bedtime  hydrochlorothiazide    Capsule 25milliGRAM(s) Oral daily    MEDICATIONS  (PRN):  oxyCODONE  5 mG/acetaminophen 325 mG 2Tablet(s) Oral every 6 hours PRN Severe Pain (7 - 10)  acetaminophen   Tablet 650milliGRAM(s) Oral every 6 hours PRN For Temp greater than 38 C (100.4 F)  acetaminophen   Tablet. 650milliGRAM(s) Oral every 6 hours PRN Moderate Pain (4 - 6)          T(C): 36.6, Max: 36.6 (06-16 @ 06:01)  HR: 60 (60 - 60)  BP: 156/88 (156/88 - 156/88)  RR: 18 (18 - 18)  SpO2: 96% (96% - 96%)  Wt(kg): --        PHYSICAL EXAM:  GENERAL: NAD, well-developed  HEAD:  Atraumatic, Normocephalic  NECK: Supple, No JVD  CHEST/LUNG: Clear to auscultation bilaterally; No wheeze  HEART: Regular rate and rhythm  ABDOMEN: Soft, Nontender, mildly distended and obese abdomen; Bowel sounds present  EXTREMITIES:  Significant edema 4+ from lymphoedema  PSYCH: AAOx3  NEUROLOGY: non-focal  SKIN: bilateral LE wounds      No new labs       No new labs Patient is a 67y old  Male who presents with a chief complaint of Lower extremity pain, swelling, redness    SUBJECTIVE / OVERNIGHT EVENTS: no events Overnight     MEDICATIONS  (STANDING):  BACItracin   Ointment 1Application(s) Topical two times a day  heparin  Injectable 5000Unit(s) SubCutaneous every 8 hours  potassium chloride    Tablet ER 10milliEquivalent(s) Oral daily  metoprolol 50milliGRAM(s) Oral two times a day  losartan 100milliGRAM(s) Oral daily  latanoprost 0.005% Ophthalmic Solution 1Drop(s) Both EYES at bedtime  hydrochlorothiazide    Capsule 25milliGRAM(s) Oral daily    MEDICATIONS  (PRN):  oxyCODONE  5 mG/acetaminophen 325 mG 2Tablet(s) Oral every 6 hours PRN Severe Pain (7 - 10)  acetaminophen   Tablet 650milliGRAM(s) Oral every 6 hours PRN For Temp greater than 38 C (100.4 F)  acetaminophen   Tablet. 650milliGRAM(s) Oral every 6 hours PRN Moderate Pain (4 - 6)    T(C): 36.6, Max: 36.6 (06-16 @ 06:01)  HR: 60 (60 - 60)  BP: 156/88 (156/88 - 156/88)  RR: 18 (18 - 18)  SpO2: 96% (96% - 96%)  Wt(kg): --    PHYSICAL EXAM:  GENERAL: NAD, well-developed  HEAD:  Atraumatic, Normocephalic  NECK: Supple, No JVD  CHEST/LUNG: Clear to auscultation bilaterally; No wheeze  HEART: Regular rate and rhythm  ABDOMEN: Soft, Nontender, mildly distended and obese abdomen; Bowel sounds present  EXTREMITIES:  Significant edema 4+ from lymphoedema  PSYCH: AAOx3  NEUROLOGY: non-focal  SKIN: bilateral LE wounds                  136|97|16<127  4.3|22|0.97  9.4,--,--  06-16 @ 07:50       No new labs

## 2017-06-26 ENCOUNTER — APPOINTMENT (OUTPATIENT)
Dept: WOUND CARE | Facility: CLINIC | Age: 67
End: 2017-06-26

## 2017-07-07 ENCOUNTER — EMERGENCY (EMERGENCY)
Facility: HOSPITAL | Age: 67
LOS: 1 days | Discharge: HOME | End: 2017-07-07
Attending: EMERGENCY MEDICINE
Payer: COMMERCIAL

## 2017-07-07 VITALS
DIASTOLIC BLOOD PRESSURE: 78 MMHG | HEART RATE: 86 BPM | HEIGHT: 71 IN | RESPIRATION RATE: 18 BRPM | SYSTOLIC BLOOD PRESSURE: 149 MMHG | TEMPERATURE: 98 F | WEIGHT: 265 LBS | OXYGEN SATURATION: 97 %

## 2017-07-07 DIAGNOSIS — M79.604 PAIN IN RIGHT LEG: ICD-10-CM

## 2017-07-07 DIAGNOSIS — G89.29 OTHER CHRONIC PAIN: ICD-10-CM

## 2017-07-07 DIAGNOSIS — Z91.041 RADIOGRAPHIC DYE ALLERGY STATUS: ICD-10-CM

## 2017-07-07 DIAGNOSIS — M79.605 PAIN IN LEFT LEG: ICD-10-CM

## 2017-07-07 DIAGNOSIS — M79.89 OTHER SPECIFIED SOFT TISSUE DISORDERS: ICD-10-CM

## 2017-07-07 PROCEDURE — 99283 EMERGENCY DEPT VISIT LOW MDM: CPT

## 2017-07-07 PROCEDURE — 99284 EMERGENCY DEPT VISIT MOD MDM: CPT | Mod: 25

## 2017-07-07 NOTE — ED PROVIDER NOTE - OBJECTIVE STATEMENT
68 y/o M pt with PMHx of chronic b/l leg pain, presents to ED c/o b/l leg pain and swelling with drainage. Pt reports he is currently homeless and visits today for monitoring. Pt denies fever, chills, nausea, vomiting, diarrhea, shortness of breath, cough, chest pain, palpitations, numbness, tingling, weakness, or any other complaints. ALLERGIES: IV Contrast  (Hives)

## 2017-07-07 NOTE — ED PROVIDER NOTE - CHPI ED SYMPTOMS NEG
no chills, no shortness of breath, no cough, no chest pain, no palpitations, no nausea, no vomiting, no diarrhea/no tingling/no numbness/no weakness/no fever

## 2017-07-08 VITALS
OXYGEN SATURATION: 98 % | HEART RATE: 79 BPM | SYSTOLIC BLOOD PRESSURE: 134 MMHG | TEMPERATURE: 98 F | DIASTOLIC BLOOD PRESSURE: 70 MMHG | RESPIRATION RATE: 18 BRPM

## 2017-07-18 ENCOUNTER — INPATIENT (INPATIENT)
Facility: HOSPITAL | Age: 67
LOS: 6 days | Discharge: HOME | End: 2017-07-25
Attending: SURGERY | Admitting: SURGERY
Payer: MEDICARE

## 2017-07-18 VITALS
SYSTOLIC BLOOD PRESSURE: 131 MMHG | TEMPERATURE: 98 F | RESPIRATION RATE: 18 BRPM | OXYGEN SATURATION: 98 % | DIASTOLIC BLOOD PRESSURE: 71 MMHG | HEART RATE: 81 BPM

## 2017-07-18 DIAGNOSIS — R10.9 UNSPECIFIED ABDOMINAL PAIN: ICD-10-CM

## 2017-07-18 LAB
HCT VFR BLD CALC: 39.7 % — SIGNIFICANT CHANGE UP (ref 39–50)
HGB BLD-MCNC: 13.1 G/DL — SIGNIFICANT CHANGE UP (ref 13–17)
MCHC RBC-ENTMCNC: 28.6 PG — SIGNIFICANT CHANGE UP (ref 27–34)
MCHC RBC-ENTMCNC: 33 % — SIGNIFICANT CHANGE UP (ref 32–36)
MCV RBC AUTO: 86.7 FL — SIGNIFICANT CHANGE UP (ref 80–100)
NRBC # FLD: 0 — SIGNIFICANT CHANGE UP
PLATELET # BLD AUTO: 239 K/UL — SIGNIFICANT CHANGE UP (ref 150–400)
PMV BLD: 9.5 FL — SIGNIFICANT CHANGE UP (ref 7–13)
RBC # BLD: 4.58 M/UL — SIGNIFICANT CHANGE UP (ref 4.2–5.8)
RBC # FLD: 13.5 % — SIGNIFICANT CHANGE UP (ref 10.3–14.5)
WBC # BLD: 8.52 K/UL — SIGNIFICANT CHANGE UP (ref 3.8–10.5)
WBC # FLD AUTO: 8.52 K/UL — SIGNIFICANT CHANGE UP (ref 3.8–10.5)

## 2017-07-18 PROCEDURE — 71010: CPT | Mod: 26

## 2017-07-18 RX ORDER — LATANOPROST 0.05 MG/ML
1 SOLUTION/ DROPS OPHTHALMIC; TOPICAL AT BEDTIME
Qty: 0 | Refills: 0 | Status: DISCONTINUED | OUTPATIENT
Start: 2017-07-18 | End: 2017-07-20

## 2017-07-18 RX ORDER — LOSARTAN POTASSIUM 100 MG/1
100 TABLET, FILM COATED ORAL DAILY
Qty: 0 | Refills: 0 | Status: DISCONTINUED | OUTPATIENT
Start: 2017-07-18 | End: 2017-07-20

## 2017-07-18 RX ORDER — OXYCODONE AND ACETAMINOPHEN 5; 325 MG/1; MG/1
1 TABLET ORAL EVERY 4 HOURS
Qty: 0 | Refills: 0 | Status: DISCONTINUED | OUTPATIENT
Start: 2017-07-18 | End: 2017-07-20

## 2017-07-18 RX ORDER — METOPROLOL TARTRATE 50 MG
50 TABLET ORAL
Qty: 0 | Refills: 0 | Status: DISCONTINUED | OUTPATIENT
Start: 2017-07-18 | End: 2017-07-20

## 2017-07-18 RX ORDER — HEPARIN SODIUM 5000 [USP'U]/ML
5000 INJECTION INTRAVENOUS; SUBCUTANEOUS EVERY 8 HOURS
Qty: 0 | Refills: 0 | Status: DISCONTINUED | OUTPATIENT
Start: 2017-07-18 | End: 2017-07-20

## 2017-07-18 RX ORDER — SODIUM CHLORIDE 9 MG/ML
1000 INJECTION, SOLUTION INTRAVENOUS
Qty: 0 | Refills: 0 | Status: DISCONTINUED | OUTPATIENT
Start: 2017-07-18 | End: 2017-07-18

## 2017-07-18 RX ADMIN — HEPARIN SODIUM 5000 UNIT(S): 5000 INJECTION INTRAVENOUS; SUBCUTANEOUS at 23:35

## 2017-07-18 NOTE — H&P ADULT - NSHPSOCIALHISTORY_GEN_ALL_CORE
Not . Homeless, Smoker for 10 years but stopped 25 years ago. Never used recreational medication. Not consuming alcohol. He is Vegetarian.

## 2017-07-18 NOTE — H&P ADULT - NSHPREVIEWOFSYSTEMS_GEN_ALL_CORE
REVIEW OF SYSTEMS  General:	AO X 3  	  Ophthalmologic: He uses glasses due to low visual acuity. He had PMH of cataract and Glucoma  	  Respiratory and Thorax: Clear to ascultation bilateraly  	  Cardiovascular:S1 S2 no mur mur, He has chronic hypertension which is controlled by medication	    Gastrointestinal: BS+, No nausea or vomiting    Genitourinary: Not examined	and no complaint    Musculoskeletal: Both lower extremities has lymphedema . Right much more than left and for which he had Laser surgery recently     Neurological: WNL	    Allergic/Immunologic: Iodinated contrast agents REVIEW OF SYSTEMS  General:	AO X 3  	  Ophthalmologic: He uses glasses due to low visual acuity. He had PMH of cataract and Glucoma  	  Respiratory and Thorax: Clear to ascultation bilateraly  	  Cardiovascular:S1 S2 no mur mur, He has chronic hypertension which is controlled by medication	    Gastrointestinal: BS+, No nausea or vomiting. A soft 3.5 cm protruded hernia is visible. Patient has BM and Passing gas    Genitourinary: Not examined	and no complaint    Musculoskeletal: Both lower extremities has lymphedema . Right much more than left and for which he had Laser surgery recently     Neurological: WNL	    Allergic/Immunologic: Iodinated contrast agents REVIEW OF SYSTEMS  General:	AO X 3  	  Ophthalmologic: He uses glasses due to low visual acuity. He had PMH of cataract and Glucoma  	  Respiratory and Thorax: Clear to ascultation bilaterally  	  Cardiovascular:S1 S2 no murmur, He has chronic hypertension which is controlled by medication	    Gastrointestinal: BS+, No nausea or vomiting. A soft 3.5 cm protruded hernia is visible. Patient has BM and Passing gas    Genitourinary: Not examined	and no complaint    Musculoskeletal: Both lower extremities has lymphedema . Right much more than left and for which he had Laser surgery recently     Neurological: WNL	    Allergic/Immunologic: Iodinated contrast agents

## 2017-07-18 NOTE — H&P ADULT - ASSESSMENT
Patient is a 67y old Male admitted  for Abdominal pain due to Incarcerated umbilical hernia. Patient is a 67y old Male with an Incarcerated umbilical hernia.  -Will obtain CT scan to evaluate hernia  -Will obtain US of right upper quadrant for history of gallstones  -Will consult cardiology for clearance  -CBC/BMP

## 2017-07-18 NOTE — H&P ADULT - FAMILY HISTORY
Father  Still living? Unknown  Family history of coronary artery disease, Age at diagnosis: Age Unknown  Family history of rheumatic heart disease, Age at diagnosis: Age Unknown     Mother  Still living? Unknown  Family history of coronary artery disease, Age at diagnosis: Age Unknown

## 2017-07-18 NOTE — H&P ADULT - NSHPPHYSICALEXAM_GEN_ALL_CORE
Red protruded umbilicus which is about 3.5 Cm in diameter Gen: NAD  HEENT: no scleral injection, EOMI, no neck masses  CV: S1/S2  Resp: clear to auscultation bilaterally  Abdomen: soft, non-distended, umbilical hernia with erythema, no drainage  Ext: warm well perfused

## 2017-07-18 NOTE — H&P ADULT - HISTORY OF PRESENT ILLNESS
Patient is a 68 Y/O male presents with Acute umbilical pain that started this morning. The pain is due to protrusion of umbilicus that was red and very sensitive. He had this situation for years but this morning got worse. He recently discharged from [facility] due to Lymphedema where he received IV antibiotics ( dose not recall the name) for cellulitises.  He lost more than 10 pounds during this hospitalization as he said . Patient is a 66 Y/O male presents with Acute umbilical pain that started this morning. The pain is due to protrusion of umbilicus that was red and very sensitive. The hernia is soft . He had this situation for years but this morning got worse. He recently discharged from [facility] due to Lymphedema where he received IV antibiotics ( dose not recall the name) for cellulitises.  He lost more than 10 pounds during this hospitalization as he said . Patient is a 66 Y/O male presents with Acute umbilical pain that started this morning. The pain is due to protrusion of umbilicus that was red and very sensitive. The hernia is soft . He had this situation for years but this morning got worse. He recently discharged from [facility] due to Lymphedema where he received IV antibiotics ( dose not recall the name) for cellulitises. He last had a bowel movement this morning and passed flatus earlier in the day. He went to see Dr. Carter and was told to come in because the hernia needed repair.     He lost more than 10 pounds during this hospitalization as he said .

## 2017-07-19 DIAGNOSIS — I89.0 LYMPHEDEMA, NOT ELSEWHERE CLASSIFIED: ICD-10-CM

## 2017-07-19 DIAGNOSIS — K42.9 UMBILICAL HERNIA WITHOUT OBSTRUCTION OR GANGRENE: ICD-10-CM

## 2017-07-19 DIAGNOSIS — I10 ESSENTIAL (PRIMARY) HYPERTENSION: ICD-10-CM

## 2017-07-19 LAB
ALBUMIN SERPL ELPH-MCNC: 3.2 G/DL — LOW (ref 3.3–5)
ALP SERPL-CCNC: 88 U/L — SIGNIFICANT CHANGE UP (ref 40–120)
ALT FLD-CCNC: 18 U/L — SIGNIFICANT CHANGE UP (ref 4–41)
APPEARANCE UR: CLEAR — SIGNIFICANT CHANGE UP
AST SERPL-CCNC: 19 U/L — SIGNIFICANT CHANGE UP (ref 4–40)
BILIRUB SERPL-MCNC: 0.3 MG/DL — SIGNIFICANT CHANGE UP (ref 0.2–1.2)
BILIRUB UR-MCNC: NEGATIVE — SIGNIFICANT CHANGE UP
BLOOD UR QL VISUAL: NEGATIVE — SIGNIFICANT CHANGE UP
BUN SERPL-MCNC: 11 MG/DL — SIGNIFICANT CHANGE UP (ref 7–23)
BUN SERPL-MCNC: 16 MG/DL — SIGNIFICANT CHANGE UP (ref 7–23)
CALCIUM SERPL-MCNC: 8.8 MG/DL — SIGNIFICANT CHANGE UP (ref 8.4–10.5)
CALCIUM SERPL-MCNC: 9.1 MG/DL — SIGNIFICANT CHANGE UP (ref 8.4–10.5)
CHLORIDE SERPL-SCNC: 102 MMOL/L — SIGNIFICANT CHANGE UP (ref 98–107)
CHLORIDE SERPL-SCNC: 103 MMOL/L — SIGNIFICANT CHANGE UP (ref 98–107)
CO2 SERPL-SCNC: 25 MMOL/L — SIGNIFICANT CHANGE UP (ref 22–31)
CO2 SERPL-SCNC: 26 MMOL/L — SIGNIFICANT CHANGE UP (ref 22–31)
COLOR SPEC: SIGNIFICANT CHANGE UP
CREAT SERPL-MCNC: 0.83 MG/DL — SIGNIFICANT CHANGE UP (ref 0.5–1.3)
CREAT SERPL-MCNC: 0.88 MG/DL — SIGNIFICANT CHANGE UP (ref 0.5–1.3)
GLUCOSE SERPL-MCNC: 125 MG/DL — HIGH (ref 70–99)
GLUCOSE SERPL-MCNC: 128 MG/DL — HIGH (ref 70–99)
GLUCOSE UR-MCNC: NEGATIVE — SIGNIFICANT CHANGE UP
HCT VFR BLD CALC: 36.9 % — LOW (ref 39–50)
HGB BLD-MCNC: 12.5 G/DL — LOW (ref 13–17)
INR BLD: 1.06 — SIGNIFICANT CHANGE UP (ref 0.88–1.17)
KETONES UR-MCNC: NEGATIVE — SIGNIFICANT CHANGE UP
LEUKOCYTE ESTERASE UR-ACNC: NEGATIVE — SIGNIFICANT CHANGE UP
MAGNESIUM SERPL-MCNC: 1.8 MG/DL — SIGNIFICANT CHANGE UP (ref 1.6–2.6)
MCHC RBC-ENTMCNC: 29 PG — SIGNIFICANT CHANGE UP (ref 27–34)
MCHC RBC-ENTMCNC: 33.9 % — SIGNIFICANT CHANGE UP (ref 32–36)
MCV RBC AUTO: 85.6 FL — SIGNIFICANT CHANGE UP (ref 80–100)
NITRITE UR-MCNC: NEGATIVE — SIGNIFICANT CHANGE UP
NRBC # FLD: 0 — SIGNIFICANT CHANGE UP
PH UR: 6 — SIGNIFICANT CHANGE UP (ref 5–8)
PHOSPHATE SERPL-MCNC: 3.6 MG/DL — SIGNIFICANT CHANGE UP (ref 2.5–4.5)
PLATELET # BLD AUTO: 243 K/UL — SIGNIFICANT CHANGE UP (ref 150–400)
PMV BLD: 9.5 FL — SIGNIFICANT CHANGE UP (ref 7–13)
POTASSIUM SERPL-MCNC: 3.2 MMOL/L — LOW (ref 3.5–5.3)
POTASSIUM SERPL-MCNC: 3.4 MMOL/L — LOW (ref 3.5–5.3)
POTASSIUM SERPL-SCNC: 3.2 MMOL/L — LOW (ref 3.5–5.3)
POTASSIUM SERPL-SCNC: 3.4 MMOL/L — LOW (ref 3.5–5.3)
PROT SERPL-MCNC: 6.6 G/DL — SIGNIFICANT CHANGE UP (ref 6–8.3)
PROT UR-MCNC: NEGATIVE — SIGNIFICANT CHANGE UP
PROTHROM AB SERPL-ACNC: 11.9 SEC — SIGNIFICANT CHANGE UP (ref 9.8–13.1)
RBC # BLD: 4.31 M/UL — SIGNIFICANT CHANGE UP (ref 4.2–5.8)
RBC # FLD: 13.4 % — SIGNIFICANT CHANGE UP (ref 10.3–14.5)
RBC CASTS # UR COMP ASSIST: SIGNIFICANT CHANGE UP (ref 0–?)
SODIUM SERPL-SCNC: 140 MMOL/L — SIGNIFICANT CHANGE UP (ref 135–145)
SODIUM SERPL-SCNC: 142 MMOL/L — SIGNIFICANT CHANGE UP (ref 135–145)
SP GR SPEC: 1.01 — SIGNIFICANT CHANGE UP (ref 1–1.03)
UROBILINOGEN FLD QL: NORMAL E.U. — SIGNIFICANT CHANGE UP (ref 0.2–1)
WBC # BLD: 7.92 K/UL — SIGNIFICANT CHANGE UP (ref 3.8–10.5)
WBC # FLD AUTO: 7.92 K/UL — SIGNIFICANT CHANGE UP (ref 3.8–10.5)
WBC UR QL: SIGNIFICANT CHANGE UP (ref 0–?)

## 2017-07-19 PROCEDURE — 99223 1ST HOSP IP/OBS HIGH 75: CPT | Mod: GC

## 2017-07-19 PROCEDURE — 93010 ELECTROCARDIOGRAM REPORT: CPT

## 2017-07-19 PROCEDURE — 76705 ECHO EXAM OF ABDOMEN: CPT | Mod: 26

## 2017-07-19 PROCEDURE — 74176 CT ABD & PELVIS W/O CONTRAST: CPT | Mod: 26

## 2017-07-19 RX ORDER — POTASSIUM CHLORIDE 20 MEQ
40 PACKET (EA) ORAL ONCE
Qty: 0 | Refills: 0 | Status: COMPLETED | OUTPATIENT
Start: 2017-07-19 | End: 2017-07-19

## 2017-07-19 RX ORDER — MAGNESIUM SULFATE 500 MG/ML
2 VIAL (ML) INJECTION ONCE
Qty: 0 | Refills: 0 | Status: COMPLETED | OUTPATIENT
Start: 2017-07-19 | End: 2017-07-19

## 2017-07-19 RX ORDER — POTASSIUM CHLORIDE 20 MEQ
10 PACKET (EA) ORAL
Qty: 0 | Refills: 0 | Status: COMPLETED | OUTPATIENT
Start: 2017-07-19 | End: 2017-07-19

## 2017-07-19 RX ORDER — SODIUM CHLORIDE 9 MG/ML
1000 INJECTION, SOLUTION INTRAVENOUS
Qty: 0 | Refills: 0 | Status: DISCONTINUED | OUTPATIENT
Start: 2017-07-19 | End: 2017-07-20

## 2017-07-19 RX ADMIN — HEPARIN SODIUM 5000 UNIT(S): 5000 INJECTION INTRAVENOUS; SUBCUTANEOUS at 22:03

## 2017-07-19 RX ADMIN — Medication 50 MILLIGRAM(S): at 17:53

## 2017-07-19 RX ADMIN — SODIUM CHLORIDE 50 MILLILITER(S): 9 INJECTION, SOLUTION INTRAVENOUS at 12:56

## 2017-07-19 RX ADMIN — HEPARIN SODIUM 5000 UNIT(S): 5000 INJECTION INTRAVENOUS; SUBCUTANEOUS at 05:59

## 2017-07-19 RX ADMIN — LATANOPROST 1 DROP(S): 0.05 SOLUTION/ DROPS OPHTHALMIC; TOPICAL at 22:03

## 2017-07-19 RX ADMIN — Medication 100 MILLIEQUIVALENT(S): at 00:56

## 2017-07-19 RX ADMIN — Medication 50 GRAM(S): at 12:56

## 2017-07-19 RX ADMIN — Medication 40 MILLIEQUIVALENT(S): at 12:56

## 2017-07-19 RX ADMIN — Medication 50 MILLIGRAM(S): at 05:59

## 2017-07-19 NOTE — CONSULT NOTE ADULT - ASSESSMENT
Patient is a 68 yo  male presenting with acute umbilical pain now being evaluated for pre-op for umbilical hernia replair with mesh.

## 2017-07-19 NOTE — CONSULT NOTE ADULT - PROBLEM SELECTOR RECOMMENDATION 9
- pt is scheduled for surgery tomorrow  - pt is able to accomplish >4 METs  - EKG pending  - PFTs for pre-intubation assessment - pt is scheduled for surgery tomorrow  - pt is able to accomplish >4 METs  - EKG normal, no ST/T wave abn, all segments WNL  - no indication for TTE at this time, as pt has no CHF symptoms  - recommend PFTs for pre-intubation assessment  - pt is low risk for moderate risk procedure - pt is scheduled for surgery tomorrow  - pt is able to accomplish >4 METs  - EKG normal, no ST/T wave abn, all segments WNL  - no indication for TTE at this time, as pt has no CHF symptoms  - recommend PFTs for pre-intubation assessment, but patient is stable for procedure tomorrow with gen surg  - pt is low risk for moderate risk procedure

## 2017-07-19 NOTE — PROVIDER CONTACT NOTE (MEDICATION) - SITUATION
Patient now refusing K+ runs. States IV site is burning and painful. Despite diluting and decreasing rate

## 2017-07-19 NOTE — ADVANCED PRACTICE NURSE CONSULT - ASSESSMENT
General: A&Ox     , bedbound, incontinent of urine and stool. Skin warm, dry with increased moisture in intertriginous folds, adequate skin turgor. Blanchable erythema on bilateral heels.     Vascular: Bilateral lower extremities with lymphedema, non-blanching erythema. Dry, flaky skin with scattered areas of hypopigmentation, evidence of re-epithelialization from previous impaired skin integrity. Thickened-yellow toenails, with hyperpigmentation of first toenails bilaterally. Stable calluses noted on plantar surfaces of Left foot 2cnd toe, and right foot 3rd and 4th toe. No temperature changes noted. Non-pitting edema bilaterally with Right leg > left leg (venous dopplers negative for DVT). Capillary refill <3 seconds. Nonpapable DP/PT pulses, with biphasic doppler sounds. Reportes increased pain at end of day, relieved by elevation.     Bilateral lower legs: Lymphedema with two small open areas of denuded epidermis on bilateral medial lower legs. Right medial lower leg area of denuded epidermis- 0.5cmx0.5cmx0.1cm, left medial lower leg areas of denuded epidermis 1cmx0.6cmx0.1cm. Bases are pink moist exposed dermis with scant serous drainage no odor. Periwound skin hypopigmentation, no erythema, no increased edema, no induration noted. Goals of care: maintain moist environment to promote wound healing, protect periwound skin.

## 2017-07-19 NOTE — ADVANCED PRACTICE NURSE CONSULT - REASON FOR CONSULT
Patient seen on skin care rounds after wound care referral received for assessment of skin impairment and recommendations of topical management. Chart reviewed: Serum albumin 3.2g/dL, Denis 19, BMI 37.9kg/m2, venous dopplers negative for DVT of B/L LE patient interviewed. Followed by Dr. Loo, bilateral lower extremities last evaluated 2 months ago. Followed by "Vascular Specialist" Dr. Marks for doppler studies of bilateral lower extremities every 6 months, last seen January 2017. To date, dopplers have been negative. States that Dr. Loo suspects the right lower extremity to have had a DVT in the past as right leg is chronically more edematous than the left. Evaluated in June by [facility] inpatient wound care team with recommendations for adaptic touch, compression wraps, elevation, and follow up care at [facility] outpatient wound center. Patient states that he is homeless but is given an OT pharmacy card to obtain medical supplies and independently cares for bilateral lower legs. Patient H/O of bilateral lower extremity lymphedema, HTN, chronic venous stasis dermatitis. Patient presented with abdominal pain. Admitted with incarcerated umbilical hernia, plan for surgical procedure tomorrow 7/20/2017. Followed by general surgery team.

## 2017-07-19 NOTE — CONSULT NOTE ADULT - SUBJECTIVE AND OBJECTIVE BOX
HPI:  Patient is a 68 yo  male presenting with acute umbilical pain. Pt was seen by Dr. Crater as an outpt and was told to come to the hospital for surgical repair of a possibly incarcerated hernia.  Pt states he only has hx of HTN and lymphedema Pt is homeless, has not had consistent medical follow up throughout the years. Pt was recently admitted to [facility] for cellulitis affecting his RLE>LLE 2/2 lymphedema  Pt states he has never had chest pain, no SOB upon exertion, he is able to walk >2 blocks at a time, but feels as though his legs and their weight get in the way of him accomplishing more. Pt denies any cardiac hx. Pt does not recall ever having a TTE performed but has had "numerous EKGs, which were normal."   Pt is currently passing flatus and having BMs. No pain or discomfort at this time.      PAST MEDICAL & SURGICAL HISTORY:  Essential hypertension  Chronic venous stasis dermatitis of both lower extremities  Lymphedema  No significant past surgical history      Review of Systems:   CONSTITUTIONAL: No fever, weight loss, or fatigue  EYES: No eye pain, visual disturbances, or discharge  ENMT:  No difficulty hearing, tinnitus, vertigo; No sinus or throat pain  NECK: No pain or stiffness  BREASTS: No pain, masses, or nipple discharge  RESPIRATORY: No cough, wheezing, chills or hemoptysis; No shortness of breath  CARDIOVASCULAR: No chest pain, palpitations, dizziness, or leg swelling  GASTROINTESTINAL: No abdominal or epigastric pain. No nausea, vomiting, or hematemesis; No diarrhea or constipation. No melena or hematochezia.  GENITOURINARY: No dysuria, frequency, hematuria, or incontinence  NEUROLOGICAL: No headaches, memory loss, loss of strength, numbness, or tremors  SKIN: No itching, burning, rashes, or lesions   LYMPH NODES: No enlarged glands  ENDOCRINE: No heat or cold intolerance; No hair loss  MUSCULOSKELETAL: No joint pain or swelling; No muscle, back, or extremity pain  PSYCHIATRIC: No depression, anxiety, mood swings, or difficulty sleeping  HEME/LYMPH: No easy bruising, or bleeding gums  ALLERY AND IMMUNOLOGIC: No hives or eczema    Allergies    iodinated radiocontrast agents (Hives)    Social History:     FAMILY HISTORY:  Family history of rheumatic heart disease (Father)  Family history of coronary artery disease (Father, Mother)      MEDICATIONS  (STANDING):  heparin  Injectable 5000 Unit(s) SubCutaneous every 8 hours  metoprolol 50 milliGRAM(s) Oral two times a day  losartan 100 milliGRAM(s) Oral daily  latanoprost 0.005% Ophthalmic Solution 1 Drop(s) Both EYES at bedtime  dextrose 5% + sodium chloride 0.45%. 1000 milliLiter(s) (50 mL/Hr) IV Continuous <Continuous>    MEDICATIONS  (PRN):  oxyCODONE    5 mG/acetaminophen 325 mG 1 Tablet(s) Oral every 4 hours PRN Moderate Pain      Vital Signs Last 24 Hrs  T(C): 36.8 (17 @ 10:10), Max: 37.3 (17 @ 01:24)  HR: 77 (17 @ 10:10) (77 - 83)  BP: 130/78 (17 @ 10:10) (109/64 - 131/71)  RR: 20 (17 @ 10:10) (18 - 20)  SpO2: 96% (17 @ 10:10) (96% - 98%)  CAPILLARY BLOOD GLUCOSE  119 (2017 13:00)        I&O's Summary    2017 07:01  -  2017 07:00  --------------------------------------------------------  IN: 240 mL / OUT: 500 mL / NET: -260 mL    2017 07:01  -  2017 14:02  --------------------------------------------------------  IN: 0 mL / OUT: 400 mL / NET: -400 mL        PHYSICAL EXAM:  GENERAL: NAD, well-developed  HEAD:  Atraumatic, Normocephalic  EYES: EOMI, PERRLA, conjunctiva and sclera clear  NECK: Supple, No JVD  CHEST/LUNG: Clear to auscultation bilaterally; No wheeze  HEART: Regular rate and rhythm; No murmurs, rubs, or gallops  ABDOMEN: Soft, Nontender, Nondistended; Bowel sounds present  EXTREMITIES:  2+ Peripheral Pulses, No clubbing, cyanosis, or edema  PSYCH: AAOx3  NEUROLOGY: non-focal  SKIN: No rashes or lesions    LABS:                        12.5   7.92  )-----------( 243      ( 2017 05:40 )             36.9     07-19    140  |  102  |  11  ----------------------------<  125<H>  3.4<L>   |  25  |  0.83    Ca    8.8      2017 05:30  Phos  3.6     -  Mg     1.8         TPro  6.6  /  Alb  3.2<L>  /  TBili  0.3  /  DBili  x   /  AST  19  /  ALT  18  /  AlkPhos  88  07-18    PT/INR - ( 2017 23:35 )   PT: 11.9 SEC;   INR: 1.06                Urinalysis Basic - ( 2017 03:35 )    Color: x / Appearance: CLEAR / S.007 / pH: 6.0  Gluc: NEGATIVE / Ketone: NEGATIVE  / Bili: NEGATIVE / Urobili: NORMAL E.U.   Blood: NEGATIVE / Protein: NEGATIVE / Nitrite: NEGATIVE   Leuk Esterase: NEGATIVE / RBC: 0-2 / WBC 0-2   Sq Epi: x / Non Sq Epi: x / Bacteria: x        RADIOLOGY & ADDITIONAL TESTS:    Imaging Personally Reviewed: CXR clear    Care Discussed with Consultants/Other Providers: Surgery HPI:  Patient is a 66 yo  male presenting with acute umbilical pain. Pt was seen by Dr. Carter as an outpt and was told to come to the hospital for surgical repair of a possibly incarcerated hernia.  Pt states he only has hx of HTN and lymphedema Pt is homeless, has not had consistent medical follow up throughout the years. Pt was recently admitted to [facility] for cellulitis affecting his RLE>LLE 2/2 lymphedema  Pt states he has never had chest pain, no SOB upon exertion, he is able to walk >2 blocks at a time, but feels as though his legs and their weight get in the way of him accomplishing more. Pt denies any cardiac hx. Pt does not recall ever having a TTE performed but has had "numerous EKGs, which were normal." Pt endorses snoring at night, but never was told he stops breathing in his sleep.  Pt is currently passing flatus and having BMs. No pain or discomfort at this time.      PAST MEDICAL & SURGICAL HISTORY:  Essential hypertension  Chronic venous stasis dermatitis of both lower extremities  Lymphedema  No significant past surgical history      Review of Systems:   CONSTITUTIONAL: No fever, weight loss, or fatigue  EYES: No eye pain, visual disturbances, or discharge  ENMT:  No difficulty hearing, tinnitus, vertigo; No sinus or throat pain  NECK: No pain or stiffness  BREASTS: No pain, masses, or nipple discharge  RESPIRATORY: No cough, wheezing, chills or hemoptysis; No shortness of breath  CARDIOVASCULAR: No chest pain, palpitations, dizziness, or leg swelling  GASTROINTESTINAL: No abdominal or epigastric pain. No nausea, vomiting, or hematemesis; No diarrhea or constipation. No melena or hematochezia.  GENITOURINARY: No dysuria, frequency, hematuria, or incontinence  NEUROLOGICAL: No headaches, memory loss, loss of strength, numbness, or tremors  SKIN: No itching, burning, rashes, or lesions   LYMPH NODES: No enlarged glands  ENDOCRINE: No heat or cold intolerance; No hair loss  MUSCULOSKELETAL: No joint pain or swelling; No muscle, back, or extremity pain  PSYCHIATRIC: No depression, anxiety, mood swings, or difficulty sleeping  HEME/LYMPH: No easy bruising, or bleeding gums  ALLERGY AND IMMUNOLOGIC: No hives or eczema    Allergies    iodinated radiocontrast agents (Hives)    Social History:     FAMILY HISTORY:  Family history of rheumatic heart disease (Father)  Family history of coronary artery disease (Father, Mother)      MEDICATIONS  (STANDING):  heparin  Injectable 5000 Unit(s) SubCutaneous every 8 hours  metoprolol 50 milliGRAM(s) Oral two times a day  losartan 100 milliGRAM(s) Oral daily  latanoprost 0.005% Ophthalmic Solution 1 Drop(s) Both EYES at bedtime  dextrose 5% + sodium chloride 0.45%. 1000 milliLiter(s) (50 mL/Hr) IV Continuous <Continuous>    MEDICATIONS  (PRN):  oxyCODONE    5 mG/acetaminophen 325 mG 1 Tablet(s) Oral every 4 hours PRN Moderate Pain      Vital Signs Last 24 Hrs  T(C): 36.8 (17 @ 10:10), Max: 37.3 (17 @ 01:24)  HR: 77 (17 @ 10:10) (77 - 83)  BP: 130/78 (17 @ 10:10) (109/64 - 131/71)  RR: 20 (17 @ 10:10) (18 - 20)  SpO2: 96% (17 @ 10:10) (96% - 98%)  CAPILLARY BLOOD GLUCOSE  119 (2017 13:00)        I&O's Summary    2017 07:  -  2017 07:00  --------------------------------------------------------  IN: 240 mL / OUT: 500 mL / NET: -260 mL    2017 07:01  -  2017 14:02  --------------------------------------------------------  IN: 0 mL / OUT: 400 mL / NET: -400 mL        PHYSICAL EXAM:  GENERAL: NAD, well-developed  HEAD:  Atraumatic, Normocephalic  EYES: EOMI, PERRLA, conjunctiva and sclera clear  NECK: Supple, No JVD  CHEST/LUNG: Clear to auscultation bilaterally; No wheeze  HEART: Regular rate and rhythm; No murmurs, rubs, or gallops  ABDOMEN: Soft, Nontender, Nondistended; Bowel sounds present  EXTREMITIES:  2+ Peripheral Pulses, No clubbing, cyanosis, or edema  PSYCH: AAOx3  NEUROLOGY: non-focal  SKIN: No rashes or lesions    LABS:                        12.5   7.92  )-----------( 243      ( 2017 05:40 )             36.9     -    140  |  102  |  11  ----------------------------<  125<H>  3.4<L>   |  25  |  0.83    Ca    8.8      2017 05:30  Phos  3.6     -  Mg     1.8         TPro  6.6  /  Alb  3.2<L>  /  TBili  0.3  /  DBili  x   /  AST  19  /  ALT  18  /  AlkPhos  88  07-18    PT/INR - ( 2017 23:35 )   PT: 11.9 SEC;   INR: 1.06                Urinalysis Basic - ( 2017 03:35 )    Color: x / Appearance: CLEAR / S.007 / pH: 6.0  Gluc: NEGATIVE / Ketone: NEGATIVE  / Bili: NEGATIVE / Urobili: NORMAL E.U.   Blood: NEGATIVE / Protein: NEGATIVE / Nitrite: NEGATIVE   Leuk Esterase: NEGATIVE / RBC: 0-2 / WBC 0-2   Sq Epi: x / Non Sq Epi: x / Bacteria: x        RADIOLOGY & ADDITIONAL TESTS:    Imaging Personally Reviewed: CXR clear    Care Discussed with Consultants/Other Providers: Surgery HPI:  Patient is a 68 yo  male presenting with acute umbilical pain. Pt was seen by Dr. Carter as an outpt and was told to come to the hospital for surgical repair of a possibly incarcerated hernia.  Pt states he only has hx of HTN and lymphedema Pt is homeless, has not had consistent medical follow up throughout the years. Pt was recently admitted to [facility] for cellulitis affecting his RLE>LLE 2/2 lymphedema  Pt states he has never had chest pain, no SOB upon exertion, he is able to walk >2 blocks at a time, but feels as though his legs and their weight get in the way of him accomplishing more. Pt denies any cardiac hx. Pt does not recall ever having a TTE performed but has had "numerous EKGs, which were normal." Pt endorses snoring at night, but never was told he stops breathing in his sleep.  Pt is currently passing flatus and having BMs. No pain or discomfort at this time.      PAST MEDICAL & SURGICAL HISTORY:  Essential hypertension  Chronic venous stasis dermatitis of both lower extremities  Lymphedema  No significant past surgical history      Review of Systems:   CONSTITUTIONAL: No fever, weight loss, or fatigue  EYES: No eye pain, visual disturbances, or discharge  ENMT:  No difficulty hearing, tinnitus, vertigo; No sinus or throat pain  NECK: No pain or stiffness  BREASTS: No pain, masses, or nipple discharge  RESPIRATORY: No cough, wheezing, chills or hemoptysis; No shortness of breath  CARDIOVASCULAR: No chest pain, palpitations, dizziness, or leg swelling  GASTROINTESTINAL: No abdominal or epigastric pain. No nausea, vomiting, or hematemesis; No diarrhea or constipation. No melena or hematochezia.  GENITOURINARY: No dysuria, frequency, hematuria, or incontinence  NEUROLOGICAL: No headaches, memory loss, loss of strength, numbness, or tremors  SKIN: No itching, burning, rashes, or lesions   LYMPH NODES: No enlarged glands  ENDOCRINE: No heat or cold intolerance; No hair loss  MUSCULOSKELETAL: No joint pain or swelling; No muscle, back, or extremity pain  PSYCHIATRIC: No depression, anxiety, mood swings, or difficulty sleeping  HEME/LYMPH: No easy bruising, or bleeding gums  ALLERGY AND IMMUNOLOGIC: No hives or eczema    Allergies    iodinated radiocontrast agents (Hives)    Social History:     FAMILY HISTORY:  Family history of rheumatic heart disease (Father)  Family history of coronary artery disease (Father, Mother)      MEDICATIONS  (STANDING):  heparin  Injectable 5000 Unit(s) SubCutaneous every 8 hours  metoprolol 50 milliGRAM(s) Oral two times a day  losartan 100 milliGRAM(s) Oral daily  latanoprost 0.005% Ophthalmic Solution 1 Drop(s) Both EYES at bedtime  dextrose 5% + sodium chloride 0.45%. 1000 milliLiter(s) (50 mL/Hr) IV Continuous <Continuous>    MEDICATIONS  (PRN):  oxyCODONE    5 mG/acetaminophen 325 mG 1 Tablet(s) Oral every 4 hours PRN Moderate Pain      Vital Signs Last 24 Hrs  T(C): 36.8 (17 @ 10:10), Max: 37.3 (17 @ 01:24)  HR: 77 (17 @ 10:10) (77 - 83)  BP: 130/78 (17 @ 10:10) (109/64 - 131/71)  RR: 20 (17 @ 10:10) (18 - 20)  SpO2: 96% (17 @ 10:10) (96% - 98%)  CAPILLARY BLOOD GLUCOSE  119 (2017 13:00)        I&O's Summary    2017 07:  -  2017 07:00  --------------------------------------------------------  IN: 240 mL / OUT: 500 mL / NET: -260 mL    2017 07:01  -  2017 14:02  --------------------------------------------------------  IN: 0 mL / OUT: 400 mL / NET: -400 mL        PHYSICAL EXAM:  GENERAL: NAD, well-developed  HEAD:  Atraumatic, Normocephalic  EYES: EOMI, PERRLA, conjunctiva and sclera clear  NECK: Supple, No JVD  CHEST/LUNG: Clear to auscultation bilaterally; No wheeze  HEART: Regular rate and rhythm; No murmurs, rubs, or gallops  ABDOMEN: Soft, Nontender +umbilical hernia with surrounding erythema, no necrosis  EXTREMITIES:  significant lymphedema R>L, leg wrappings.  PSYCH: AAOx3  NEUROLOGY: non-focal      LABS:                        12.5   7.92  )-----------( 243      ( 2017 05:40 )             36.9     07-19    140  |  102  |  11  ----------------------------<  125<H>  3.4<L>   |  25  |  0.83    Ca    8.8      2017 05:30  Phos  3.6     07-  Mg     1.8     -    TPro  6.6  /  Alb  3.2<L>  /  TBili  0.3  /  DBili  x   /  AST  19  /  ALT  18  /  AlkPhos  88  07-18    PT/INR - ( 2017 23:35 )   PT: 11.9 SEC;   INR: 1.06            Urinalysis Basic - ( 2017 03:35 )    Color: x / Appearance: CLEAR / S.007 / pH: 6.0  Gluc: NEGATIVE / Ketone: NEGATIVE  / Bili: NEGATIVE / Urobili: NORMAL E.U.   Blood: NEGATIVE / Protein: NEGATIVE / Nitrite: NEGATIVE   Leuk Esterase: NEGATIVE / RBC: 0-2 / WBC 0-2   Sq Epi: x / Non Sq Epi: x / Bacteria: x      EKG: stable as compared to EKG performed in 2017  RADIOLOGY & ADDITIONAL TESTS:    Imaging Personally Reviewed: CXR clear    Care Discussed with Consultants/Other Providers: Surgery

## 2017-07-19 NOTE — ADVANCED PRACTICE NURSE CONSULT - RECOMMEDATIONS
Continue follow up care with Dr. Loo/[facility] outpatient wound care center.  Nutrition consult.    Topical Recommendations:  Bilateral lower extremities- Gently cleanse in circular motion with NS daily. Dry well. Gently cleanse area of denuded epidermis with NS. Pat dry. Apply Liquid barrier film to periwound skin. Cover with 4x4 Silicone foam with border. Apply Sween 24 to areas of intact dry skin. Secure dressing with Kerlix wrap and ACE bandage using 50/50 rule for compression. Change dressings every 48 hours unless soiled or compromised.    Continue low air loss bed therapy, continue heel elevation with Z-flex fluidized positioning boots, continue to turn & reposition q2h with Z-alessia fluidized positioning device, continue use of single breathable pad, continue measures to decrease friction/shear/pressure.     Continue with nutritional support as per dietary/orders.    Findings and plan discussed with patient and primary team. All questions and concerns addressed.    Please contact Wound Care Service Line if we can be of further assistance (ext [phone]).

## 2017-07-20 ENCOUNTER — RESULT REVIEW (OUTPATIENT)
Age: 67
End: 2017-07-20

## 2017-07-20 ENCOUNTER — TRANSCRIPTION ENCOUNTER (OUTPATIENT)
Age: 67
End: 2017-07-20

## 2017-07-20 LAB
APTT BLD: 42.8 SEC — HIGH (ref 27.5–37.4)
BLD GP AB SCN SERPL QL: NEGATIVE — SIGNIFICANT CHANGE UP
BUN SERPL-MCNC: 10 MG/DL — SIGNIFICANT CHANGE UP (ref 7–23)
CALCIUM SERPL-MCNC: 9 MG/DL — SIGNIFICANT CHANGE UP (ref 8.4–10.5)
CHLORIDE SERPL-SCNC: 104 MMOL/L — SIGNIFICANT CHANGE UP (ref 98–107)
CO2 SERPL-SCNC: 24 MMOL/L — SIGNIFICANT CHANGE UP (ref 22–31)
CREAT SERPL-MCNC: 0.9 MG/DL — SIGNIFICANT CHANGE UP (ref 0.5–1.3)
GLUCOSE SERPL-MCNC: 121 MG/DL — HIGH (ref 70–99)
HCT VFR BLD CALC: 39.3 % — SIGNIFICANT CHANGE UP (ref 39–50)
HGB BLD-MCNC: 13.2 G/DL — SIGNIFICANT CHANGE UP (ref 13–17)
INR BLD: 1 — SIGNIFICANT CHANGE UP (ref 0.88–1.17)
MAGNESIUM SERPL-MCNC: 2 MG/DL — SIGNIFICANT CHANGE UP (ref 1.6–2.6)
MCHC RBC-ENTMCNC: 28.9 PG — SIGNIFICANT CHANGE UP (ref 27–34)
MCHC RBC-ENTMCNC: 33.6 % — SIGNIFICANT CHANGE UP (ref 32–36)
MCV RBC AUTO: 86.2 FL — SIGNIFICANT CHANGE UP (ref 80–100)
NRBC # FLD: 0 — SIGNIFICANT CHANGE UP
PHOSPHATE SERPL-MCNC: 3.8 MG/DL — SIGNIFICANT CHANGE UP (ref 2.5–4.5)
PLATELET # BLD AUTO: 235 K/UL — SIGNIFICANT CHANGE UP (ref 150–400)
PMV BLD: 9.7 FL — SIGNIFICANT CHANGE UP (ref 7–13)
POTASSIUM SERPL-MCNC: 4 MMOL/L — SIGNIFICANT CHANGE UP (ref 3.5–5.3)
POTASSIUM SERPL-SCNC: 4 MMOL/L — SIGNIFICANT CHANGE UP (ref 3.5–5.3)
PROTHROM AB SERPL-ACNC: 11.2 SEC — SIGNIFICANT CHANGE UP (ref 9.8–13.1)
RBC # BLD: 4.56 M/UL — SIGNIFICANT CHANGE UP (ref 4.2–5.8)
RBC # FLD: 13.4 % — SIGNIFICANT CHANGE UP (ref 10.3–14.5)
RH IG SCN BLD-IMP: POSITIVE — SIGNIFICANT CHANGE UP
RH IG SCN BLD-IMP: POSITIVE — SIGNIFICANT CHANGE UP
SODIUM SERPL-SCNC: 141 MMOL/L — SIGNIFICANT CHANGE UP (ref 135–145)
WBC # BLD: 6.59 K/UL — SIGNIFICANT CHANGE UP (ref 3.8–10.5)
WBC # FLD AUTO: 6.59 K/UL — SIGNIFICANT CHANGE UP (ref 3.8–10.5)

## 2017-07-20 PROCEDURE — 88304 TISSUE EXAM BY PATHOLOGIST: CPT | Mod: 26

## 2017-07-20 PROCEDURE — 88302 TISSUE EXAM BY PATHOLOGIST: CPT | Mod: 26

## 2017-07-20 RX ORDER — LATANOPROST 0.05 MG/ML
1 SOLUTION/ DROPS OPHTHALMIC; TOPICAL AT BEDTIME
Qty: 0 | Refills: 0 | Status: DISCONTINUED | OUTPATIENT
Start: 2017-07-20 | End: 2017-07-25

## 2017-07-20 RX ORDER — ENOXAPARIN SODIUM 100 MG/ML
40 INJECTION SUBCUTANEOUS DAILY
Qty: 0 | Refills: 0 | Status: DISCONTINUED | OUTPATIENT
Start: 2017-07-21 | End: 2017-07-25

## 2017-07-20 RX ORDER — FENTANYL CITRATE 50 UG/ML
25 INJECTION INTRAVENOUS
Qty: 0 | Refills: 0 | Status: DISCONTINUED | OUTPATIENT
Start: 2017-07-20 | End: 2017-07-20

## 2017-07-20 RX ORDER — FAMOTIDINE 10 MG/ML
20 INJECTION INTRAVENOUS ONCE
Qty: 0 | Refills: 0 | Status: COMPLETED | OUTPATIENT
Start: 2017-07-20 | End: 2017-07-20

## 2017-07-20 RX ORDER — ONDANSETRON 8 MG/1
4 TABLET, FILM COATED ORAL ONCE
Qty: 0 | Refills: 0 | Status: COMPLETED | OUTPATIENT
Start: 2017-07-20 | End: 2017-07-20

## 2017-07-20 RX ORDER — SODIUM CHLORIDE 9 MG/ML
1000 INJECTION, SOLUTION INTRAVENOUS
Qty: 0 | Refills: 0 | Status: DISCONTINUED | OUTPATIENT
Start: 2017-07-20 | End: 2017-07-21

## 2017-07-20 RX ORDER — OXYCODONE AND ACETAMINOPHEN 5; 325 MG/1; MG/1
2 TABLET ORAL EVERY 6 HOURS
Qty: 0 | Refills: 0 | Status: DISCONTINUED | OUTPATIENT
Start: 2017-07-20 | End: 2017-07-22

## 2017-07-20 RX ORDER — METOPROLOL TARTRATE 50 MG
50 TABLET ORAL
Qty: 0 | Refills: 0 | Status: DISCONTINUED | OUTPATIENT
Start: 2017-07-20 | End: 2017-07-25

## 2017-07-20 RX ORDER — MORPHINE SULFATE 50 MG/1
4 CAPSULE, EXTENDED RELEASE ORAL EVERY 4 HOURS
Qty: 0 | Refills: 0 | Status: DISCONTINUED | OUTPATIENT
Start: 2017-07-20 | End: 2017-07-24

## 2017-07-20 RX ORDER — LOSARTAN POTASSIUM 100 MG/1
100 TABLET, FILM COATED ORAL DAILY
Qty: 0 | Refills: 0 | Status: DISCONTINUED | OUTPATIENT
Start: 2017-07-20 | End: 2017-07-25

## 2017-07-20 RX ORDER — SODIUM CHLORIDE 9 MG/ML
1000 INJECTION, SOLUTION INTRAVENOUS ONCE
Qty: 0 | Refills: 0 | Status: COMPLETED | OUTPATIENT
Start: 2017-07-20 | End: 2017-07-20

## 2017-07-20 RX ORDER — HYDROMORPHONE HYDROCHLORIDE 2 MG/ML
0.5 INJECTION INTRAMUSCULAR; INTRAVENOUS; SUBCUTANEOUS
Qty: 0 | Refills: 0 | Status: DISCONTINUED | OUTPATIENT
Start: 2017-07-20 | End: 2017-07-21

## 2017-07-20 RX ORDER — OXYCODONE HYDROCHLORIDE 5 MG/1
10 TABLET ORAL ONCE
Qty: 0 | Refills: 0 | Status: DISCONTINUED | OUTPATIENT
Start: 2017-07-20 | End: 2017-07-20

## 2017-07-20 RX ORDER — FENTANYL CITRATE 50 UG/ML
50 INJECTION INTRAVENOUS
Qty: 0 | Refills: 0 | Status: DISCONTINUED | OUTPATIENT
Start: 2017-07-20 | End: 2017-07-20

## 2017-07-20 RX ORDER — ACETAMINOPHEN 500 MG
650 TABLET ORAL ONCE
Qty: 0 | Refills: 0 | Status: COMPLETED | OUTPATIENT
Start: 2017-07-20 | End: 2017-07-20

## 2017-07-20 RX ORDER — OXYCODONE AND ACETAMINOPHEN 5; 325 MG/1; MG/1
1 TABLET ORAL EVERY 4 HOURS
Qty: 0 | Refills: 0 | Status: DISCONTINUED | OUTPATIENT
Start: 2017-07-20 | End: 2017-07-22

## 2017-07-20 RX ORDER — DOCUSATE SODIUM 100 MG
100 CAPSULE ORAL THREE TIMES A DAY
Qty: 0 | Refills: 0 | Status: DISCONTINUED | OUTPATIENT
Start: 2017-07-20 | End: 2017-07-25

## 2017-07-20 RX ADMIN — FENTANYL CITRATE 50 MICROGRAM(S): 50 INJECTION INTRAVENOUS at 20:15

## 2017-07-20 RX ADMIN — SODIUM CHLORIDE 100 MILLILITER(S): 9 INJECTION, SOLUTION INTRAVENOUS at 06:38

## 2017-07-20 RX ADMIN — HYDROMORPHONE HYDROCHLORIDE 0.5 MILLIGRAM(S): 2 INJECTION INTRAMUSCULAR; INTRAVENOUS; SUBCUTANEOUS at 21:45

## 2017-07-20 RX ADMIN — SODIUM CHLORIDE 100 MILLILITER(S): 9 INJECTION, SOLUTION INTRAVENOUS at 10:28

## 2017-07-20 RX ADMIN — HYDROMORPHONE HYDROCHLORIDE 0.5 MILLIGRAM(S): 2 INJECTION INTRAMUSCULAR; INTRAVENOUS; SUBCUTANEOUS at 21:15

## 2017-07-20 RX ADMIN — Medication 50 MILLIGRAM(S): at 06:14

## 2017-07-20 RX ADMIN — Medication 650 MILLIGRAM(S): at 11:38

## 2017-07-20 RX ADMIN — FENTANYL CITRATE 50 MICROGRAM(S): 50 INJECTION INTRAVENOUS at 20:00

## 2017-07-20 RX ADMIN — SODIUM CHLORIDE 2000 MILLILITER(S): 9 INJECTION, SOLUTION INTRAVENOUS at 20:53

## 2017-07-20 RX ADMIN — HYDROMORPHONE HYDROCHLORIDE 0.5 MILLIGRAM(S): 2 INJECTION INTRAMUSCULAR; INTRAVENOUS; SUBCUTANEOUS at 21:30

## 2017-07-20 RX ADMIN — ONDANSETRON 4 MILLIGRAM(S): 8 TABLET, FILM COATED ORAL at 20:46

## 2017-07-20 RX ADMIN — HYDROMORPHONE HYDROCHLORIDE 0.5 MILLIGRAM(S): 2 INJECTION INTRAMUSCULAR; INTRAVENOUS; SUBCUTANEOUS at 20:00

## 2017-07-20 RX ADMIN — Medication 650 MILLIGRAM(S): at 10:20

## 2017-07-20 RX ADMIN — OXYCODONE HYDROCHLORIDE 10 MILLIGRAM(S): 5 TABLET ORAL at 20:45

## 2017-07-20 RX ADMIN — FAMOTIDINE 20 MILLIGRAM(S): 10 INJECTION INTRAVENOUS at 21:30

## 2017-07-20 RX ADMIN — LOSARTAN POTASSIUM 100 MILLIGRAM(S): 100 TABLET, FILM COATED ORAL at 06:14

## 2017-07-20 RX ADMIN — HEPARIN SODIUM 5000 UNIT(S): 5000 INJECTION INTRAVENOUS; SUBCUTANEOUS at 13:56

## 2017-07-20 RX ADMIN — OXYCODONE HYDROCHLORIDE 10 MILLIGRAM(S): 5 TABLET ORAL at 20:15

## 2017-07-20 RX ADMIN — SODIUM CHLORIDE 50 MILLILITER(S): 9 INJECTION, SOLUTION INTRAVENOUS at 00:05

## 2017-07-20 RX ADMIN — SODIUM CHLORIDE 100 MILLILITER(S): 9 INJECTION, SOLUTION INTRAVENOUS at 23:02

## 2017-07-20 RX ADMIN — HEPARIN SODIUM 5000 UNIT(S): 5000 INJECTION INTRAVENOUS; SUBCUTANEOUS at 06:14

## 2017-07-20 NOTE — BRIEF OPERATIVE NOTE - PROCEDURE
Laparoscopic cholecystectomy  07/20/2017    Active  [id number]  Umbilical hernia repair  07/20/2017  no mesh  Active  [id number]

## 2017-07-21 ENCOUNTER — APPOINTMENT (OUTPATIENT)
Dept: WOUND CARE | Facility: CLINIC | Age: 67
End: 2017-07-21

## 2017-07-21 DIAGNOSIS — I87.2 VENOUS INSUFFICIENCY (CHRONIC) (PERIPHERAL): ICD-10-CM

## 2017-07-21 DIAGNOSIS — K42.0 UMBILICAL HERNIA WITH OBSTRUCTION, WITHOUT GANGRENE: ICD-10-CM

## 2017-07-21 DIAGNOSIS — N40.1 BENIGN PROSTATIC HYPERPLASIA WITH LOWER URINARY TRACT SYMPTOMS: ICD-10-CM

## 2017-07-21 LAB
ALBUMIN SERPL ELPH-MCNC: 2.9 G/DL — LOW (ref 3.3–5)
ALP SERPL-CCNC: 74 U/L — SIGNIFICANT CHANGE UP (ref 40–120)
ALT FLD-CCNC: 28 U/L — SIGNIFICANT CHANGE UP (ref 4–41)
AST SERPL-CCNC: 29 U/L — SIGNIFICANT CHANGE UP (ref 4–40)
BILIRUB SERPL-MCNC: 0.2 MG/DL — SIGNIFICANT CHANGE UP (ref 0.2–1.2)
BUN SERPL-MCNC: 11 MG/DL — SIGNIFICANT CHANGE UP (ref 7–23)
CALCIUM SERPL-MCNC: 8.8 MG/DL — SIGNIFICANT CHANGE UP (ref 8.4–10.5)
CHLORIDE SERPL-SCNC: 102 MMOL/L — SIGNIFICANT CHANGE UP (ref 98–107)
CO2 SERPL-SCNC: 24 MMOL/L — SIGNIFICANT CHANGE UP (ref 22–31)
CREAT SERPL-MCNC: 1.02 MG/DL — SIGNIFICANT CHANGE UP (ref 0.5–1.3)
GLUCOSE SERPL-MCNC: 147 MG/DL — HIGH (ref 70–99)
HCT VFR BLD CALC: 38.2 % — LOW (ref 39–50)
HGB BLD-MCNC: 12.7 G/DL — LOW (ref 13–17)
MAGNESIUM SERPL-MCNC: 1.8 MG/DL — SIGNIFICANT CHANGE UP (ref 1.6–2.6)
MCHC RBC-ENTMCNC: 28.6 PG — SIGNIFICANT CHANGE UP (ref 27–34)
MCHC RBC-ENTMCNC: 33.2 % — SIGNIFICANT CHANGE UP (ref 32–36)
MCV RBC AUTO: 86 FL — SIGNIFICANT CHANGE UP (ref 80–100)
NRBC # FLD: 0 — SIGNIFICANT CHANGE UP
PLATELET # BLD AUTO: 243 K/UL — SIGNIFICANT CHANGE UP (ref 150–400)
PMV BLD: 9.4 FL — SIGNIFICANT CHANGE UP (ref 7–13)
POTASSIUM SERPL-MCNC: 4.2 MMOL/L — SIGNIFICANT CHANGE UP (ref 3.5–5.3)
POTASSIUM SERPL-SCNC: 4.2 MMOL/L — SIGNIFICANT CHANGE UP (ref 3.5–5.3)
PROT SERPL-MCNC: 6.2 G/DL — SIGNIFICANT CHANGE UP (ref 6–8.3)
RBC # BLD: 4.44 M/UL — SIGNIFICANT CHANGE UP (ref 4.2–5.8)
RBC # FLD: 13.3 % — SIGNIFICANT CHANGE UP (ref 10.3–14.5)
SODIUM SERPL-SCNC: 139 MMOL/L — SIGNIFICANT CHANGE UP (ref 135–145)
WBC # BLD: 9.46 K/UL — SIGNIFICANT CHANGE UP (ref 3.8–10.5)
WBC # FLD AUTO: 9.46 K/UL — SIGNIFICANT CHANGE UP (ref 3.8–10.5)

## 2017-07-21 RX ORDER — SODIUM CHLORIDE 9 MG/ML
1000 INJECTION, SOLUTION INTRAVENOUS ONCE
Qty: 0 | Refills: 0 | Status: COMPLETED | OUTPATIENT
Start: 2017-07-21 | End: 2017-07-21

## 2017-07-21 RX ORDER — SODIUM CHLORIDE 9 MG/ML
3 INJECTION INTRAMUSCULAR; INTRAVENOUS; SUBCUTANEOUS EVERY 8 HOURS
Qty: 0 | Refills: 0 | Status: DISCONTINUED | OUTPATIENT
Start: 2017-07-21 | End: 2017-07-25

## 2017-07-21 RX ORDER — ACETAMINOPHEN 500 MG
650 TABLET ORAL ONCE
Qty: 0 | Refills: 0 | Status: COMPLETED | OUTPATIENT
Start: 2017-07-21 | End: 2017-07-21

## 2017-07-21 RX ORDER — TAMSULOSIN HYDROCHLORIDE 0.4 MG/1
0.4 CAPSULE ORAL AT BEDTIME
Qty: 0 | Refills: 0 | Status: DISCONTINUED | OUTPATIENT
Start: 2017-07-21 | End: 2017-07-25

## 2017-07-21 RX ORDER — ACETAMINOPHEN 500 MG
1000 TABLET ORAL ONCE
Qty: 0 | Refills: 0 | Status: COMPLETED | OUTPATIENT
Start: 2017-07-21 | End: 2017-07-21

## 2017-07-21 RX ORDER — LIDOCAINE 4 G/100G
1 CREAM TOPICAL ONCE
Qty: 0 | Refills: 0 | Status: COMPLETED | OUTPATIENT
Start: 2017-07-21 | End: 2017-07-21

## 2017-07-21 RX ORDER — HYDROCHLOROTHIAZIDE 25 MG
25 TABLET ORAL DAILY
Qty: 0 | Refills: 0 | Status: DISCONTINUED | OUTPATIENT
Start: 2017-07-21 | End: 2017-07-25

## 2017-07-21 RX ADMIN — TAMSULOSIN HYDROCHLORIDE 0.4 MILLIGRAM(S): 0.4 CAPSULE ORAL at 21:31

## 2017-07-21 RX ADMIN — LATANOPROST 1 DROP(S): 0.05 SOLUTION/ DROPS OPHTHALMIC; TOPICAL at 21:29

## 2017-07-21 RX ADMIN — Medication 1000 MILLIGRAM(S): at 04:29

## 2017-07-21 RX ADMIN — SODIUM CHLORIDE 3 MILLILITER(S): 9 INJECTION INTRAMUSCULAR; INTRAVENOUS; SUBCUTANEOUS at 21:47

## 2017-07-21 RX ADMIN — SODIUM CHLORIDE 666.67 MILLILITER(S): 9 INJECTION, SOLUTION INTRAVENOUS at 07:11

## 2017-07-21 RX ADMIN — Medication 25 MILLIGRAM(S): at 11:39

## 2017-07-21 RX ADMIN — ENOXAPARIN SODIUM 40 MILLIGRAM(S): 100 INJECTION SUBCUTANEOUS at 11:39

## 2017-07-21 RX ADMIN — Medication 100 MILLIGRAM(S): at 21:30

## 2017-07-21 RX ADMIN — Medication 650 MILLIGRAM(S): at 18:57

## 2017-07-21 RX ADMIN — Medication 1 TABLET(S): at 18:57

## 2017-07-21 RX ADMIN — Medication 50 MILLIGRAM(S): at 18:57

## 2017-07-21 RX ADMIN — Medication 650 MILLIGRAM(S): at 19:33

## 2017-07-21 RX ADMIN — LATANOPROST 1 DROP(S): 0.05 SOLUTION/ DROPS OPHTHALMIC; TOPICAL at 02:25

## 2017-07-21 RX ADMIN — Medication 400 MILLIGRAM(S): at 04:19

## 2017-07-21 RX ADMIN — Medication 50 MILLIGRAM(S): at 05:53

## 2017-07-21 RX ADMIN — Medication 1 TABLET(S): at 06:19

## 2017-07-21 RX ADMIN — LOSARTAN POTASSIUM 100 MILLIGRAM(S): 100 TABLET, FILM COATED ORAL at 05:53

## 2017-07-21 RX ADMIN — Medication 100 MILLIGRAM(S): at 13:48

## 2017-07-21 RX ADMIN — Medication 100 MILLIGRAM(S): at 05:53

## 2017-07-21 NOTE — PROVIDER CONTACT NOTE (OTHER) - SITUATION
Patient came out of the OR at 19:50. Patient is due to void at 03:50. Patient is also requesting OTC pain medication.

## 2017-07-22 LAB
BUN SERPL-MCNC: 8 MG/DL — SIGNIFICANT CHANGE UP (ref 7–23)
CALCIUM SERPL-MCNC: 9.3 MG/DL — SIGNIFICANT CHANGE UP (ref 8.4–10.5)
CHLORIDE SERPL-SCNC: 104 MMOL/L — SIGNIFICANT CHANGE UP (ref 98–107)
CO2 SERPL-SCNC: 28 MMOL/L — SIGNIFICANT CHANGE UP (ref 22–31)
CREAT SERPL-MCNC: 1.05 MG/DL — SIGNIFICANT CHANGE UP (ref 0.5–1.3)
GLUCOSE SERPL-MCNC: 112 MG/DL — HIGH (ref 70–99)
HCT VFR BLD CALC: 40.4 % — SIGNIFICANT CHANGE UP (ref 39–50)
HGB BLD-MCNC: 13.4 G/DL — SIGNIFICANT CHANGE UP (ref 13–17)
MAGNESIUM SERPL-MCNC: 1.8 MG/DL — SIGNIFICANT CHANGE UP (ref 1.6–2.6)
MCHC RBC-ENTMCNC: 29.6 PG — SIGNIFICANT CHANGE UP (ref 27–34)
MCHC RBC-ENTMCNC: 33.2 % — SIGNIFICANT CHANGE UP (ref 32–36)
MCV RBC AUTO: 89.4 FL — SIGNIFICANT CHANGE UP (ref 80–100)
NRBC # FLD: 0 — SIGNIFICANT CHANGE UP
PHOSPHATE SERPL-MCNC: 3.5 MG/DL — SIGNIFICANT CHANGE UP (ref 2.5–4.5)
PLATELET # BLD AUTO: 259 K/UL — SIGNIFICANT CHANGE UP (ref 150–400)
PMV BLD: 9.6 FL — SIGNIFICANT CHANGE UP (ref 7–13)
POTASSIUM SERPL-MCNC: 4.5 MMOL/L — SIGNIFICANT CHANGE UP (ref 3.5–5.3)
POTASSIUM SERPL-SCNC: 4.5 MMOL/L — SIGNIFICANT CHANGE UP (ref 3.5–5.3)
RBC # BLD: 4.52 M/UL — SIGNIFICANT CHANGE UP (ref 4.2–5.8)
RBC # FLD: 13.6 % — SIGNIFICANT CHANGE UP (ref 10.3–14.5)
SODIUM SERPL-SCNC: 141 MMOL/L — SIGNIFICANT CHANGE UP (ref 135–145)
WBC # BLD: 8.8 K/UL — SIGNIFICANT CHANGE UP (ref 3.8–10.5)
WBC # FLD AUTO: 8.8 K/UL — SIGNIFICANT CHANGE UP (ref 3.8–10.5)

## 2017-07-22 PROCEDURE — 99222 1ST HOSP IP/OBS MODERATE 55: CPT

## 2017-07-22 RX ORDER — ACETAMINOPHEN 500 MG
650 TABLET ORAL EVERY 6 HOURS
Qty: 0 | Refills: 0 | Status: DISCONTINUED | OUTPATIENT
Start: 2017-07-22 | End: 2017-07-25

## 2017-07-22 RX ADMIN — Medication 100 MILLIGRAM(S): at 14:48

## 2017-07-22 RX ADMIN — Medication 650 MILLIGRAM(S): at 20:42

## 2017-07-22 RX ADMIN — ENOXAPARIN SODIUM 40 MILLIGRAM(S): 100 INJECTION SUBCUTANEOUS at 12:51

## 2017-07-22 RX ADMIN — SODIUM CHLORIDE 3 MILLILITER(S): 9 INJECTION INTRAMUSCULAR; INTRAVENOUS; SUBCUTANEOUS at 22:04

## 2017-07-22 RX ADMIN — Medication 100 MILLIGRAM(S): at 06:09

## 2017-07-22 RX ADMIN — Medication 100 MILLIGRAM(S): at 22:06

## 2017-07-22 RX ADMIN — Medication 1 TABLET(S): at 06:09

## 2017-07-22 RX ADMIN — Medication 650 MILLIGRAM(S): at 21:22

## 2017-07-22 RX ADMIN — SODIUM CHLORIDE 3 MILLILITER(S): 9 INJECTION INTRAMUSCULAR; INTRAVENOUS; SUBCUTANEOUS at 17:23

## 2017-07-22 RX ADMIN — LOSARTAN POTASSIUM 100 MILLIGRAM(S): 100 TABLET, FILM COATED ORAL at 06:09

## 2017-07-22 RX ADMIN — SODIUM CHLORIDE 3 MILLILITER(S): 9 INJECTION INTRAMUSCULAR; INTRAVENOUS; SUBCUTANEOUS at 06:08

## 2017-07-22 RX ADMIN — Medication 50 MILLIGRAM(S): at 06:09

## 2017-07-22 RX ADMIN — Medication 25 MILLIGRAM(S): at 06:09

## 2017-07-22 RX ADMIN — TAMSULOSIN HYDROCHLORIDE 0.4 MILLIGRAM(S): 0.4 CAPSULE ORAL at 22:06

## 2017-07-22 RX ADMIN — Medication 50 MILLIGRAM(S): at 18:05

## 2017-07-22 RX ADMIN — LATANOPROST 1 DROP(S): 0.05 SOLUTION/ DROPS OPHTHALMIC; TOPICAL at 22:06

## 2017-07-22 RX ADMIN — Medication 1 TABLET(S): at 18:05

## 2017-07-22 NOTE — CONSULT NOTE ADULT - PROBLEM SELECTOR RECOMMENDATION 9
--C/W Singh-> Trial of void before discharge with Post void residual  --C/W Flomax 0.4mg daily  --Bowel regimen to ensure stool soft   --Ambulate as tolerated. Likely needs PT eval  --F/U In resident clinic as outpatient   --D/W Dr. Leyva who agrees with above plan

## 2017-07-22 NOTE — CONSULT NOTE ADULT - SUBJECTIVE AND OBJECTIVE BOX
HPI  66 yo M s/p lap romel umbilical hernia repair. POD#2 pt had malhotra removed and went into urinary retention. Pt had malhotra replaced with 800 cc of clear yellow urine returned. Per pt he states he has good urinary stream and has never been in retention requiring malhotra before. Pt states prior to surgery he was able to empty his bladder without difficulty but because he is currently on HCTZ he has nocturia (x5) with frequent voids. Pt is not having BMs and passing minimal gas.     PAST MEDICAL & SURGICAL HISTORY:  Essential hypertension  Chronic venous stasis dermatitis of both lower extremities  Lymphedema  No significant past surgical history      MEDICATIONS  (STANDING):  metoprolol 50 milliGRAM(s) Oral two times a day  losartan 100 milliGRAM(s) Oral daily  latanoprost 0.005% Ophthalmic Solution 1 Drop(s) Both EYES at bedtime  docusate sodium 100 milliGRAM(s) Oral three times a day  amoxicillin  875 milliGRAM(s)/clavulanate 1 Tablet(s) Oral every 12 hours  enoxaparin Injectable 40 milliGRAM(s) SubCutaneous daily  tamsulosin 0.4 milliGRAM(s) Oral at bedtime  hydrochlorothiazide 25 milliGRAM(s) Oral daily  sodium chloride 0.9% lock flush 3 milliLiter(s) IV Push every 8 hours    MEDICATIONS  (PRN):  oxyCODONE    5 mG/acetaminophen 325 mG 1 Tablet(s) Oral every 4 hours PRN Mild Pain (1 - 3)  oxyCODONE    5 mG/acetaminophen 325 mG 2 Tablet(s) Oral every 6 hours PRN Moderate Pain (4 - 6)  morphine  - Injectable 4 milliGRAM(s) IV Push every 4 hours PRN Severe Pain (7 - 10)      FAMILY HISTORY:  Family history of rheumatic heart disease (Father)  Family history of coronary artery disease (Father, Mother)      Allergies    iodinated radiocontrast agents (Hives)    Intolerances        SOCIAL HISTORY:    REVIEW OF SYSTEMS: Otherwise negative as stated in HPI    Physical Exam  Vital signs  T(C): 36.9 (07-22-17 @ 10:19), Max: 37.4 (07-22-17 @ 05:53)  HR: 58 (07-22-17 @ 14:49)  BP: 142/74 (07-22-17 @ 14:49)  SpO2: 97% (07-22-17 @ 14:49)  Wt(kg): --    Output  I&O's Summary    21 Jul 2017 07:01  -  22 Jul 2017 07:00  --------------------------------------------------------  IN: 240 mL / OUT: 5345 mL / NET: -5105 mL    22 Jul 2017 07:01  -  22 Jul 2017 18:04  --------------------------------------------------------  IN: 0 mL / OUT: 1500 mL / NET: -1500 mL      UOP    Gen:NAD  Pulm: No respiratory distress  CV: RRR, LE edema with pitting edema  Abd: Soft. Mildly distended. Appropriately tender. Incisions c/d/i.   : Circumcised. No scrotal edema. Malhotra in place draining clear urine.   LILIANE: prostate non tender. No nodules. Soft. Enlarged.    LABS:      07-22 @ 06:00    WBC 8.80  / Hct 40.4  / SCr 1.05     07-21 @ 09:23    WBC 9.46  / Hct 38.2  / SCr 1.02     07-22    141  |  104  |  8   ----------------------------<  112<H>  4.5   |  28  |  1.05    Ca    9.3      22 Jul 2017 06:00  Phos  3.5     07-22  Mg     1.8     07-22    TPro  6.2  /  Alb  2.9<L>  /  TBili  0.2  /  DBili  x   /  AST  29  /  ALT  28  /  AlkPhos  74  07-21

## 2017-07-22 NOTE — CONSULT NOTE ADULT - ATTENDING COMMENTS
Patient seen and examined. Agree with team note and treatment plan.
RCRI score 0 with good functional capacity. patient might have underlying RONAN/OHS. would recommend follow up with anesthesia for sedation management. currently medically optimized for planned procedure.

## 2017-07-23 DIAGNOSIS — E66.01 MORBID (SEVERE) OBESITY DUE TO EXCESS CALORIES: ICD-10-CM

## 2017-07-23 LAB
BUN SERPL-MCNC: 7 MG/DL — SIGNIFICANT CHANGE UP (ref 7–23)
CALCIUM SERPL-MCNC: 8.9 MG/DL — SIGNIFICANT CHANGE UP (ref 8.4–10.5)
CHLORIDE SERPL-SCNC: 99 MMOL/L — SIGNIFICANT CHANGE UP (ref 98–107)
CO2 SERPL-SCNC: 29 MMOL/L — SIGNIFICANT CHANGE UP (ref 22–31)
CREAT SERPL-MCNC: 0.93 MG/DL — SIGNIFICANT CHANGE UP (ref 0.5–1.3)
GLUCOSE SERPL-MCNC: 118 MG/DL — HIGH (ref 70–99)
HCT VFR BLD CALC: 41.5 % — SIGNIFICANT CHANGE UP (ref 39–50)
HGB BLD-MCNC: 13.6 G/DL — SIGNIFICANT CHANGE UP (ref 13–17)
MCHC RBC-ENTMCNC: 28.5 PG — SIGNIFICANT CHANGE UP (ref 27–34)
MCHC RBC-ENTMCNC: 32.8 % — SIGNIFICANT CHANGE UP (ref 32–36)
MCV RBC AUTO: 87 FL — SIGNIFICANT CHANGE UP (ref 80–100)
NRBC # FLD: 0 — SIGNIFICANT CHANGE UP
PLATELET # BLD AUTO: 261 K/UL — SIGNIFICANT CHANGE UP (ref 150–400)
PMV BLD: 9.5 FL — SIGNIFICANT CHANGE UP (ref 7–13)
POTASSIUM SERPL-MCNC: 3.9 MMOL/L — SIGNIFICANT CHANGE UP (ref 3.5–5.3)
POTASSIUM SERPL-SCNC: 3.9 MMOL/L — SIGNIFICANT CHANGE UP (ref 3.5–5.3)
RBC # BLD: 4.77 M/UL — SIGNIFICANT CHANGE UP (ref 4.2–5.8)
RBC # FLD: 13.3 % — SIGNIFICANT CHANGE UP (ref 10.3–14.5)
SODIUM SERPL-SCNC: 139 MMOL/L — SIGNIFICANT CHANGE UP (ref 135–145)
WBC # BLD: 8.77 K/UL — SIGNIFICANT CHANGE UP (ref 3.8–10.5)
WBC # FLD AUTO: 8.77 K/UL — SIGNIFICANT CHANGE UP (ref 3.8–10.5)

## 2017-07-23 PROCEDURE — 99232 SBSQ HOSP IP/OBS MODERATE 35: CPT

## 2017-07-23 RX ADMIN — LOSARTAN POTASSIUM 100 MILLIGRAM(S): 100 TABLET, FILM COATED ORAL at 05:19

## 2017-07-23 RX ADMIN — Medication 650 MILLIGRAM(S): at 18:36

## 2017-07-23 RX ADMIN — ENOXAPARIN SODIUM 40 MILLIGRAM(S): 100 INJECTION SUBCUTANEOUS at 12:51

## 2017-07-23 RX ADMIN — Medication 25 MILLIGRAM(S): at 05:19

## 2017-07-23 RX ADMIN — Medication 1 TABLET(S): at 05:19

## 2017-07-23 RX ADMIN — Medication 50 MILLIGRAM(S): at 05:19

## 2017-07-23 RX ADMIN — LATANOPROST 1 DROP(S): 0.05 SOLUTION/ DROPS OPHTHALMIC; TOPICAL at 21:42

## 2017-07-23 RX ADMIN — Medication 50 MILLIGRAM(S): at 18:36

## 2017-07-23 RX ADMIN — Medication 650 MILLIGRAM(S): at 19:15

## 2017-07-23 RX ADMIN — SODIUM CHLORIDE 3 MILLILITER(S): 9 INJECTION INTRAMUSCULAR; INTRAVENOUS; SUBCUTANEOUS at 05:21

## 2017-07-23 RX ADMIN — SODIUM CHLORIDE 3 MILLILITER(S): 9 INJECTION INTRAMUSCULAR; INTRAVENOUS; SUBCUTANEOUS at 14:00

## 2017-07-23 RX ADMIN — Medication 1 TABLET(S): at 18:36

## 2017-07-23 RX ADMIN — Medication 100 MILLIGRAM(S): at 05:18

## 2017-07-23 RX ADMIN — SODIUM CHLORIDE 3 MILLILITER(S): 9 INJECTION INTRAMUSCULAR; INTRAVENOUS; SUBCUTANEOUS at 21:37

## 2017-07-23 RX ADMIN — TAMSULOSIN HYDROCHLORIDE 0.4 MILLIGRAM(S): 0.4 CAPSULE ORAL at 21:42

## 2017-07-24 LAB
BUN SERPL-MCNC: 9 MG/DL — SIGNIFICANT CHANGE UP (ref 7–23)
CALCIUM SERPL-MCNC: 9.4 MG/DL — SIGNIFICANT CHANGE UP (ref 8.4–10.5)
CHLORIDE SERPL-SCNC: 100 MMOL/L — SIGNIFICANT CHANGE UP (ref 98–107)
CO2 SERPL-SCNC: 26 MMOL/L — SIGNIFICANT CHANGE UP (ref 22–31)
CREAT SERPL-MCNC: 0.89 MG/DL — SIGNIFICANT CHANGE UP (ref 0.5–1.3)
GLUCOSE SERPL-MCNC: 122 MG/DL — HIGH (ref 70–99)
HCT VFR BLD CALC: 40.5 % — SIGNIFICANT CHANGE UP (ref 39–50)
HGB BLD-MCNC: 13.9 G/DL — SIGNIFICANT CHANGE UP (ref 13–17)
MAGNESIUM SERPL-MCNC: 1.9 MG/DL — SIGNIFICANT CHANGE UP (ref 1.6–2.6)
MCHC RBC-ENTMCNC: 29.8 PG — SIGNIFICANT CHANGE UP (ref 27–34)
MCHC RBC-ENTMCNC: 34.3 % — SIGNIFICANT CHANGE UP (ref 32–36)
MCV RBC AUTO: 86.9 FL — SIGNIFICANT CHANGE UP (ref 80–100)
NRBC # FLD: 0 — SIGNIFICANT CHANGE UP
PHOSPHATE SERPL-MCNC: 3.8 MG/DL — SIGNIFICANT CHANGE UP (ref 2.5–4.5)
PLATELET # BLD AUTO: 270 K/UL — SIGNIFICANT CHANGE UP (ref 150–400)
PMV BLD: 9.5 FL — SIGNIFICANT CHANGE UP (ref 7–13)
POTASSIUM SERPL-MCNC: 4.3 MMOL/L — SIGNIFICANT CHANGE UP (ref 3.5–5.3)
POTASSIUM SERPL-SCNC: 4.3 MMOL/L — SIGNIFICANT CHANGE UP (ref 3.5–5.3)
RBC # BLD: 4.66 M/UL — SIGNIFICANT CHANGE UP (ref 4.2–5.8)
RBC # FLD: 13.3 % — SIGNIFICANT CHANGE UP (ref 10.3–14.5)
SODIUM SERPL-SCNC: 138 MMOL/L — SIGNIFICANT CHANGE UP (ref 135–145)
WBC # BLD: 10 K/UL — SIGNIFICANT CHANGE UP (ref 3.8–10.5)
WBC # FLD AUTO: 10 K/UL — SIGNIFICANT CHANGE UP (ref 3.8–10.5)

## 2017-07-24 RX ADMIN — ENOXAPARIN SODIUM 40 MILLIGRAM(S): 100 INJECTION SUBCUTANEOUS at 12:20

## 2017-07-24 RX ADMIN — Medication 650 MILLIGRAM(S): at 00:00

## 2017-07-24 RX ADMIN — LOSARTAN POTASSIUM 100 MILLIGRAM(S): 100 TABLET, FILM COATED ORAL at 05:20

## 2017-07-24 RX ADMIN — Medication 650 MILLIGRAM(S): at 23:32

## 2017-07-24 RX ADMIN — SODIUM CHLORIDE 3 MILLILITER(S): 9 INJECTION INTRAMUSCULAR; INTRAVENOUS; SUBCUTANEOUS at 05:16

## 2017-07-24 RX ADMIN — Medication 50 MILLIGRAM(S): at 05:20

## 2017-07-24 RX ADMIN — SODIUM CHLORIDE 3 MILLILITER(S): 9 INJECTION INTRAMUSCULAR; INTRAVENOUS; SUBCUTANEOUS at 13:48

## 2017-07-24 RX ADMIN — Medication 1 TABLET(S): at 05:20

## 2017-07-24 RX ADMIN — SODIUM CHLORIDE 3 MILLILITER(S): 9 INJECTION INTRAMUSCULAR; INTRAVENOUS; SUBCUTANEOUS at 22:05

## 2017-07-24 RX ADMIN — TAMSULOSIN HYDROCHLORIDE 0.4 MILLIGRAM(S): 0.4 CAPSULE ORAL at 22:08

## 2017-07-24 RX ADMIN — Medication 1 TABLET(S): at 19:01

## 2017-07-24 RX ADMIN — LATANOPROST 1 DROP(S): 0.05 SOLUTION/ DROPS OPHTHALMIC; TOPICAL at 22:06

## 2017-07-24 RX ADMIN — Medication 50 MILLIGRAM(S): at 19:01

## 2017-07-24 RX ADMIN — Medication 25 MILLIGRAM(S): at 05:20

## 2017-07-24 NOTE — PROGRESS NOTE ADULT - ASSESSMENT
67M 1 day S/P  lap choley and Incarcerated umbilical hernia repair, tolerating procedure well but oliguric post-op, bladder scan revealed 867 this AM, patient was malhotra cathed, AVSS    Plan:  ·	Patient is homeless, will obtain more complete medication list  ·	Keep malhotra, add flomax, monitor UOP
Patient is a 67y old Male with incarcerated umbilical hernia.   - OR tomorrow  - F/u RUQ ultrasound  - F/u CT abdomen and pelvis with oral contrast.   - Place cardiology consult for clearance  - Replete potassium orally
44M s/p repair of incarcerated umbilical hernia and lap cholecystectomy.    Plan:  - Cont augmentin for 4 days  - regular diet  - D/c on Saturday due to social issues
Patient is a 67y old Male s/p Umbilical hernia repair Laparoscopic cholecystectomy   for Abdominal pain.  Plan:  - Abd x ray
Patient is a 67y old Male s/p Umbilical hernia repair Laparoscopic cholecystectomy for Abdominal pain.  - Patient voiding and ready for DC tomorrow - does not want to go to shelter.  - Reach out to  on homeless status  - Spoke with  saturday, okay to DC to storage unit.
Patient is a 67y old Male s/p Umbilical hernia repair and Laparoscopic cholecystectomy for Abdominal pain.  - Diet, regular  - C/w augmentin  - C/w malhotra, trial of void tomorrow am.   - C/w Flomax  - Remove steri strips  - Spoke with , mic to OK to [place].   - BP control - HCTZ, Metoprolol, Losartan
Patient is a 67y old Male with incarcerated umbilical hernia.   - OR today: hernia repair and possible cholecystectomy  - Consent in chart  - Medical clearance as of 7/19  - c/w pain control   - NPO/ IVF

## 2017-07-25 ENCOUNTER — TRANSCRIPTION ENCOUNTER (OUTPATIENT)
Age: 67
End: 2017-07-25

## 2017-07-25 VITALS
SYSTOLIC BLOOD PRESSURE: 138 MMHG | DIASTOLIC BLOOD PRESSURE: 73 MMHG | TEMPERATURE: 98 F | RESPIRATION RATE: 18 BRPM | HEART RATE: 66 BPM | OXYGEN SATURATION: 97 %

## 2017-07-25 DIAGNOSIS — Z65.9 PROBLEM RELATED TO UNSPECIFIED PSYCHOSOCIAL CIRCUMSTANCES: ICD-10-CM

## 2017-07-25 DIAGNOSIS — K42.9 UMBILICAL HERNIA WITHOUT OBSTRUCTION OR GANGRENE: ICD-10-CM

## 2017-07-25 RX ORDER — TAMSULOSIN HYDROCHLORIDE 0.4 MG/1
1 CAPSULE ORAL
Qty: 5 | Refills: 0 | OUTPATIENT
Start: 2017-07-25 | End: 2017-07-30

## 2017-07-25 RX ADMIN — SODIUM CHLORIDE 3 MILLILITER(S): 9 INJECTION INTRAMUSCULAR; INTRAVENOUS; SUBCUTANEOUS at 06:40

## 2017-07-25 RX ADMIN — LOSARTAN POTASSIUM 100 MILLIGRAM(S): 100 TABLET, FILM COATED ORAL at 06:45

## 2017-07-25 RX ADMIN — Medication 25 MILLIGRAM(S): at 06:45

## 2017-07-25 RX ADMIN — Medication 50 MILLIGRAM(S): at 06:45

## 2017-07-25 NOTE — DISCHARGE NOTE ADULT - HOSPITAL COURSE
Patient is a 67y old Male s/p Umbilical hernia repair, Laparoscopic cholecystectomy for Abdominal pain.    H/P: Patient is a 66 Y/O male presents with Acute umbilical pain that started this morning. The pain is due to protrusion of umbilicus that was red and very sensitive. The hernia is soft . He had this situation for years but this morning got worse. He recently discharged from [facility] due to Lymphedema where he received IV antibiotics ( dose not recall the name) for cellulitises. He last had a bowel movement this morning and passed flatus earlier in the day. He went to see Dr. Carter and was told to come in because the hernia needed repair. He lost more than 10 pounds during this hospitalization as he said .    Patient was admitted to surgery. Patient underwent a laparoscopic cholecystectomy and umbilical hernia repair. Post-operatively the patient was extubated without complications. He tolerated the procedure well. He returned to the floor where his vitals signs were stable and he recovered from the operation. The patient's vital signs and labs were stable. His diet was advanced and his GI function returned to normal. He is ambulating and tolerating his diet. He expressed he does not want to be discharged a shelter and would prefer to be discharged to his storage unit as he is currently homeless.     Vital Signs Last 24 Hrs  T(C): 36.7 (25 Jul 2017 06:42), Max: 37 (24 Jul 2017 18:08)  T(F): 98.1 (25 Jul 2017 06:42), Max: 98.6 (24 Jul 2017 18:08)  HR: 64 (25 Jul 2017 06:42) (64 - 76)  BP: 140/84 (25 Jul 2017 06:42) (128/82 - 145/88)  BP(mean): --  RR: 18 (25 Jul 2017 06:42) (16 - 18)  SpO2: 96% (25 Jul 2017 06:42) (94% - 97%)  I&O's Detail    24 Jul 2017 07:01  -  25 Jul 2017 07:00  --------------------------------------------------------  IN:  Total IN: 0 mL    OUT:    Voided: 2000 mL  Total OUT: 2000 mL    Total NET: -2000 mL      25 Jul 2017 07:01  -  25 Jul 2017 09:55  --------------------------------------------------------  IN:    Oral Fluid: 240 mL  Total IN: 240 mL    OUT:  Total OUT: 0 mL    Total NET: 240 mL          LABS:                        13.9   10.00 )-----------( 270      ( 24 Jul 2017 06:30 )             40.5           07-24    138  |  100  |  9   ----------------------------<  122<H>  4.3   |  26  |  0.89    Ca    9.4      24 Jul 2017 06:30  Phos  3.8     07-24  Mg     1.9     07-24            MEDICATIONS  (STANDING):  metoprolol 50 milliGRAM(s) Oral two times a day  losartan 100 milliGRAM(s) Oral daily  latanoprost 0.005% Ophthalmic Solution 1 Drop(s) Both EYES at bedtime  docusate sodium 100 milliGRAM(s) Oral three times a day  enoxaparin Injectable 40 milliGRAM(s) SubCutaneous daily  tamsulosin 0.4 milliGRAM(s) Oral at bedtime  hydrochlorothiazide 25 milliGRAM(s) Oral daily  sodium chloride 0.9% lock flush 3 milliLiter(s) IV Push every 8 hours

## 2017-07-25 NOTE — DISCHARGE NOTE ADULT - ADDITIONAL INSTRUCTIONS
Follow up with Dr. Carter (surgeon).  Call today for appointment within 1 week.    Follow up with Urology Clinic.  Call today for appointment within 1 week [phone]  Follow up with Wound Care Clinic for lower extremity dressing changes (as prior to admission).  Follow up with your Primary Care Physician.  Call today for appointment in 1 week.

## 2017-07-25 NOTE — DISCHARGE NOTE ADULT - CARE PLAN
Principal Discharge DX:	Umbilical hernia without obstruction and without gangrene  Goal:	Return to normal GI function and activity  Instructions for follow-up, activity and diet:	WOUND CARE:  Please keep incisions clean and dry. Please do not Scrub or rub incisions. Do not use lotion or powder on incisions.   BATHING: Please do not submerge wound underwater. You may shower and/or sponge bathe.  ACTIVITY: No heavy lifting or straining. Otherwise, you may return to your usual level of physical activity. If you are taking narcotic pain medication (such as Percocet) DO NOT drive a car, operate machinery or make important decisions.  DIET: Return to your usual diet.  NOTIFY YOUR SURGEON IF: You have any bleeding that does not stop, any pus draining from your wound(s), any fever (over 100.4 F) or chills, persistent nausea/vomiting, persistent diarrhea, or if your pain is not controlled on your discharge pain medications.  FOLLOW-UP: Please follow up with your primary care physician in one week regarding your hospitalization. Please follow-up with your surgeon, within 7 days following discharge- please call to schedule an appointment. Principal Discharge DX:	Umbilical hernia without obstruction and without gangrene  Goal:	Return to normal GI function and activity  Instructions for follow-up, activity and diet:	WOUND CARE:  Please keep incisions clean and dry. Please do not Scrub or rub incisions. Do not use lotion or powder on incisions.   BATHING: Please do not submerge wound underwater. You may shower and/or sponge bathe.  ACTIVITY: No heavy lifting or straining. Otherwise, you may return to your usual level of physical activity. If you are taking narcotic pain medication (such as Percocet) DO NOT drive a car, operate machinery or make important decisions.  DIET: Return to your usual diet.  NOTIFY YOUR SURGEON IF: You have any bleeding that does not stop, any pus draining from your wound(s), any fever (over 100.4 F) or chills, persistent nausea/vomiting, persistent diarrhea, or if your pain is not controlled on your discharge pain medications.  FOLLOW-UP: Please follow up with your primary care physician in one week regarding your hospitalization. Please follow-up with your surgeon, within 7 days following discharge- please call to schedule an appointment.  Secondary Diagnosis:	Urinary retention  Goal:	f/u Urology Clinic  Instructions for follow-up, activity and diet:	You were started on a medication called Flomax while in the hospital due to inability to urinate after your malhotra catheter was removed.  Please follow up in the Urology Clinic to evaluate if you still require this medication.  Secondary Diagnosis:	Chronic venous stasis dermatitis of both lower extremities  Goal:	continue to follow up in wound care clinic  Instructions for follow-up, activity and diet:	Please continue to follow up in your wound care clinic for dressing changes.

## 2017-07-25 NOTE — DISCHARGE NOTE ADULT - MEDICATION SUMMARY - MEDICATIONS TO TAKE
I will START or STAY ON the medications listed below when I get home from the hospital:    acetaminophen 325 mg oral tablet  -- 2 tab(s) by mouth every 6 hours, As needed, Moderate Pain (4 - 6)  -- Indication: For pain    losartan 100 mg oral tablet  -- 1 tab(s) by mouth once a day  -- Indication: For htn    tamsulosin 0.4 mg oral capsule  -- 1 cap(s) by mouth once a day (at bedtime) MDD:1 cap  -- Indication: For BPH    metoprolol tartrate 50 mg oral tablet  -- 1 tab(s) by mouth 2 times a day  -- Indication: For htn    hydroCHLOROthiazide 25 mg oral tablet  -- 1 tab(s) by mouth once a day  -- Avoid prolonged or excessive exposure to direct and/or artificial sunlight while taking this medication.  It is very important that you take or use this exactly as directed.  Do not skip doses or discontinue unless directed by your doctor.  It may be advisable to drink a full glass orange juice or eat a banana daily while taking this medication.  Take with food or milk.    -- Indication: For htn    potassium chloride 10 mEq oral capsule, extended release  -- 1 cap(s) by mouth once a day  -- Indication: For ppx    latanoprost 0.005% ophthalmic solution  -- 1 drop(s) to each affected eye once a day (in the evening)  -- Indication: For Eyes    amoxicillin-clavulanate 500 mg-125 mg oral tablet  -- 1 milligram(s) by mouth every 8 hours MDD:3 tablets  -- Finish all this medication unless otherwise directed by prescriber.  Take with food or milk.    -- Indication: For Antibiotic    Vitamin D3 5000 intl units oral capsule  -- 1 cap(s) by mouth once a day  -- Indication: For ppx

## 2017-07-25 NOTE — DISCHARGE NOTE ADULT - PATIENT PORTAL LINK FT
“You can access the [facility] Patient Portal, offered by [facility], by registering with the following website: [email or web address]”

## 2017-07-25 NOTE — PROGRESS NOTE ADULT - SUBJECTIVE AND OBJECTIVE BOX
STATUS POST:      POST OPERATIVE DAY #:     SUBJECTIVE: Pt seen and examined at bedside. Patient describe yellow mucous discharge from hernia site overnight. Patient comfortable and in no-apparent distress. No nausea, vomiting, or diarrhea.   - Passing gas  - Passing stool   - Pain controlled     Pain: [ ] YES [x ] NO  Pain Control Adequate: [x ] YES [ ] NO  SOB: [ ]YES [x ] NO  Chest Discomfort: [ ] YES [ x] NO    Nausea: [ ] YES [x ] NO           Vomiting: [ ] YES [x ] NO  Flatus: [x ] YES [ ] NO             Bowel Movement: [x ] YES [ ] NO     Void: [x ]YES [ ]No    Vital Signs Last 24 Hrs  T(C): 37.3 (2017 05:58), Max: 37.3 (2017 01:24)  T(F): 99.1 (2017 05:58), Max: 99.1 (2017 01:24)  HR: 80 (2017 05:58) (80 - 83)  BP: 109/64 (2017 05:58) (109/64 - 131/71)  BP(mean): --  RR: 18 (2017 05:58) (18 - 18)  SpO2: 96% (2017 05:58) (96% - 98%)    Physical Exam:  General Appearance: Appears well, NAD  Neck: Supple  Chest: Clear to auscultation bilaterally. No wheezes, rales, or rhonchi.  Equal expansion bilaterally, equal breath sounds  CV: S1 S2 appreciated, no S3 or S4. Regular rate and rhythm. No murmurs, rubs, or gallops. Pulse regular presently  Abdomen: Soft, nontense. Umbilical hernia ~3x3cm, erythematous, tender, ulcerated with no discharge. Obese habitus.  Extremities: Asymmetric chronic lymphedema from undetermined cause. Right lower extremity larger than left.    LABS:                        12.5   7.92  )-----------( 243      ( 2017 05:40 )             36.9     -19    140  |  102  |  11  ----------------------------<  125<H>  3.4<L>   |  25  |  0.83    Ca    8.8      2017 05:30  Phos  3.6       Mg     1.8         TPro  6.6  /  Alb  3.2<L>  /  TBili  0.3  /  DBili  x   /  AST    /  ALT  18  /  AlkPhos  88      PT/INR - ( 2017 23:35 )   PT: 11.9 SEC;   INR: 1.06            Urinalysis Basic - ( 2017 03:35 )    Color: x / Appearance: CLEAR / S.007 / pH: 6.0  Gluc: NEGATIVE / Ketone: NEGATIVE  / Bili: NEGATIVE / Urobili: NORMAL E.U.   Blood: NEGATIVE / Protein: NEGATIVE / Nitrite: NEGATIVE   Leuk Esterase: NEGATIVE / RBC: 0-2 / WBC 0-2   Sq Epi: x / Non Sq Epi: x / Bacteria: x        INs and OUTs:    17 @ 07:01  -  17 @ 07:00  --------------------------------------------------------  IN: 240 mL / OUT: 500 mL / NET: -260 mL
A TEAMS SURGERY    SUBJECTIVE: Pt seen and examined at bedside. Patient comfortable and in no-apparent distress. No nausea, vomiting, or diarrhea.   - Passing gas  - Passing stool   - Pain controlled     Vital Signs Last 24 Hrs  T(C): 36.6 (20 Jul 2017 10:03), Max: 36.9 (19 Jul 2017 22:09)  T(F): 97.9 (20 Jul 2017 10:03), Max: 98.5 (19 Jul 2017 22:09)  HR: 59 (20 Jul 2017 10:03) (59 - 73)  BP: 134/70 (20 Jul 2017 10:03) (130/74 - 142/69)  BP(mean): --  RR: 18 (20 Jul 2017 10:03) (18 - 21)  SpO2: 96% (20 Jul 2017 10:03) (95% - 98%)    I&O's Detail    19 Jul 2017 07:01  -  20 Jul 2017 07:00  --------------------------------------------------------  IN:  Total IN: 0 mL    OUT:    Voided: 1000 mL  Total OUT: 1000 mL    Total NET: -1000 mL      20 Jul 2017 07:01  -  20 Jul 2017 11:39  --------------------------------------------------------  IN:  Total IN: 0 mL    OUT:    Voided: 500 mL  Total OUT: 500 mL    Total NET: -500 mL        Physical Exam:  General Appearance: Appears well, NAD  Neck: Supple  Chest: Clear to auscultation bilaterally. No wheezes, rales, or rhonchi.  Equal expansion bilaterally, equal breath sounds  CV: S1 S2 appreciated, no S3 or S4. Regular rate and rhythm. No murmurs, rubs, or gallops. Pulse regular presently  Abdomen: Soft, nontense. Umbilical hernia ~3x3cm, erythematous, tender, ulcerated with no discharge. Obese habitus.  Extremities: Asymmetric chronic lymphedema from undetermined cause. Right lower extremity larger than left.
Patient admitted yesterday with painful , draining incarcerated Umbilical Hernia.  Pain decreased. Labs . tests results are reviewed  Ct scan abd /pelvis ...inc u h  , gall stones  Patient is advised repair of inc u h and lap romel together. as the pain may be caused by both.  N R B of op. discussed.  For o r tomorrow.  D / W the ANDRA.
Post op note A Team    Procedure: Repair of incarcerated umbilical hernia and lap romel     S: Patient found sleeping at bedside. Comfortable when being aroused. Pain is minimal. No chest pain, no shortness of breath. Mild nausea. No vomiting    O:  Vitals: Temp: 97.9, BP:154/78; HR: 53, RR: 17; SaO2:98%  Abd: Non-tender, distended, soft, incision sites clean/dry/intact      No labs
STATUS POST:  Umbilical hernia repair  Laparoscopic cholecystectomy      POST OPERATIVE DAY #: 3    SUBJECTIVE: Pt seen and examined at bedside. No overnight events. Patient comfortable and in no-apparent distress. No nausea, vomiting, or diarrhea.   - Passing gas  - not Passing stool   - Pain controlled     Pain: [ ] YES [x ] NO  Pain Control Adequate: [x ] YES [ ] NO  SOB: [ ]YES [x ] NO  Chest Discomfort: [ ] YES [x ] NO    Nausea: [ ] YES [x ] NO           Vomiting: [ ] YES x[ ] NO  Flatus: [x ] YES [ ] NO             Bowel Movement: [ ] YES [ x] NO     Void: [x ]YES [ ]No    Vital Signs Last 24 Hrs  T(C): 36.8 (23 Jul 2017 10:49), Max: 37.3 (22 Jul 2017 18:32)  T(F): 98.3 (23 Jul 2017 10:49), Max: 99.2 (22 Jul 2017 18:32)  HR: 73 (23 Jul 2017 10:49) (56 - 73)  BP: 131/77 (23 Jul 2017 10:49) (113/63 - 142/74)  BP(mean): --  RR: 18 (23 Jul 2017 10:49) (15 - 18)  SpO2: 95% (23 Jul 2017 10:49) (95% - 100%)    Physical Exam:  General Appearance: Appears well, NAD  Neck: Supple  Chest: Equal expansion bilaterally, equal breath sounds  CV: Pulse regular presently  Abdomen: Soft, nontense, appropriate incisional tenderness, dressings clean and dry and intact  Extremities: Grossly symmetric    LABS:                        13.6   8.77  )-----------( 261      ( 23 Jul 2017 06:25 )             41.5     07-23    139  |  99  |  7   ----------------------------<  118<H>  3.9   |  29  |  0.93    Ca    8.9      23 Jul 2017 06:25  Phos  3.5     07-22  Mg     1.8     07-22            INs and OUTs:    07-22-17 @ 07:01  -  07-23-17 @ 07:00  --------------------------------------------------------  IN: 0 mL / OUT: 4300 mL / NET: -4300 mL    07-23-17 @ 07:01  -  07-23-17 @ 11:39  --------------------------------------------------------  IN: 0 mL / OUT: 200 mL / NET: -200 mL
STATUS POST:  Umbilical hernia repair and Laparoscopic cholecystectomy    POST OPERATIVE DAY #: 2    SUBJECTIVE: Pt seen and examined at bedside. No overnight events. Patient comfortable and in no-apparent distress. No nausea, vomiting, or diarrhea. Ambulating. Malhotra in place, patient urinated around malhotra.   - Passing gas  - Not Passing stool   - Pain controlled       Vital Signs Last 24 Hrs  T(C): 36.9 (22 Jul 2017 10:19), Max: 37.4 (22 Jul 2017 05:53)  T(F): 98.5 (22 Jul 2017 10:19), Max: 99.4 (22 Jul 2017 05:53)  HR: 62 (22 Jul 2017 10:19) (51 - 66)  BP: 127/72 (22 Jul 2017 10:19) (127/72 - 160/86)  BP(mean): --  RR: 18 (22 Jul 2017 10:19) (17 - 18)  SpO2: 96% (22 Jul 2017 10:19) (95% - 96%)    Physical Exam:  General Appearance: Appears well, NAD  Neck: Supple  Chest: Equal expansion bilaterally, equal breath sounds  CV: Pulse regular presently  Abdomen: Soft, nontense, appropriate incisional tenderness, dressings clean and dry and intact. Mildly distended. Tender to palpation.   Extremities: Grossly symmetric    LABS:                        13.4   8.80  )-----------( 259      ( 22 Jul 2017 06:00 )             40.4     07-22    141  |  104  |  8   ----------------------------<  112<H>  4.5   |  28  |  1.05    Ca    9.3      22 Jul 2017 06:00  Phos  3.5     07-22  Mg     1.8     07-22    TPro  6.2  /  Alb  2.9<L>  /  TBili  0.2  /  DBili  x   /  AST  29  /  ALT  28  /  AlkPhos  74  07-21          INs and OUTs:    07-21-17 @ 07:01  -  07-22-17 @ 07:00  --------------------------------------------------------  IN: 240 mL / OUT: 5345 mL / NET: -5105 mL    07-22-17 @ 07:01  -  07-22-17 @ 14:43  --------------------------------------------------------  IN: 0 mL / OUT: 900 mL / NET: -900 mL
STATUS POST:  Umbilical hernia repair and Laparoscopic cholecystectomy    POST OPERATIVE DAY #: 4    SUBJECTIVE: Pt seen and examined at bedside. No overnight events. Patient comfortable and in no-apparent distress. No nausea, vomiting, or diarrhea. Patient ambulating.   - Passing gas  - Passing stool   - Pain controlled       Vital Signs Last 24 Hrs  T(C): 36.8 (24 Jul 2017 05:18), Max: 37 (23 Jul 2017 15:11)  T(F): 98.3 (24 Jul 2017 05:18), Max: 98.6 (23 Jul 2017 15:11)  HR: 68 (24 Jul 2017 05:18) (62 - 90)  BP: 141/79 (24 Jul 2017 05:18) (114/87 - 141/79)  BP(mean): --  RR: 17 (24 Jul 2017 05:18) (17 - 19)  SpO2: 98% (24 Jul 2017 05:18) (95% - 99%)    Physical Exam:  General Appearance: Appears well, NAD  Neck: Supple  Chest: Equal expansion bilaterally, equal breath sounds  CV: Pulse regular presently  Abdomen: Soft, nontense, appropriate incisional tenderness, dressings clean and dry and intact. NTND.  Extremities: Grossly symmetric    LABS:                        13.9   10.00 )-----------( 270      ( 24 Jul 2017 06:30 )             40.5     07-24    138  |  100  |  9   ----------------------------<  122<H>  4.3   |  26  |  0.89    Ca    9.4      24 Jul 2017 06:30  Phos  3.8     07-24  Mg     1.9     07-24            INs and OUTs:    07-23-17 @ 07:01  -  07-24-17 @ 07:00  --------------------------------------------------------  IN: 480 mL / OUT: 2000 mL / NET: -1520 mL
Same as A team note.  Recovering slowly. Umb wound healing slowly.   to continue antibiotics  local wound care.  Labs reviewed.   D /W ANDRA,  And the pt.
Subjective: I am tolerating regular diet. I moved my bowels and I am urinating    Objective:   Vital Signs Last 24 Hrs  T(C): 36.6 (2017 11:01), Max: 37 (2017 18:08)  T(F): 97.9 (2017 11:01), Max: 98.6 (2017 18:08)  HR: 66 (:) (64 - 76)  BP: 138/73 (:) (128/82 - 145/88)  BP(mean): --  RR: 18 (:) (16 - 18)  SpO2: 97% (:) (94% - 97%)    Daily     Daily Weight in k (2017 01:12)    Heent: N/C, no scleral icterus,   Neck: No JVD  Pul: clear  Cor: RRR  Abdomen: Obese, soft, normal BS. Wound - clean  Extremities: bilateral edema with stasis dermatitis, motor/sensory intact    I&O's Detail    2017 07:01  -  2017 07:00  --------------------------------------------------------  IN:  Total IN: 0 mL    OUT:    Voided: 2000 mL  Total OUT: 2000 mL    Total NET: -2000 mL      2017 07:01  -  2017 13:11  --------------------------------------------------------  IN:    Oral Fluid: 480 mL  Total IN: 480 mL    OUT:  Total OUT: 0 mL    Total NET: 480 mL          MEDICATIONS  (STANDING):  metoprolol 50 milliGRAM(s) Oral two times a day  losartan 100 milliGRAM(s) Oral daily  latanoprost 0.005% Ophthalmic Solution 1 Drop(s) Both EYES at bedtime  docusate sodium 100 milliGRAM(s) Oral three times a day  enoxaparin Injectable 40 milliGRAM(s) SubCutaneous daily  tamsulosin 0.4 milliGRAM(s) Oral at bedtime  hydrochlorothiazide 25 milliGRAM(s) Oral daily  sodium chloride 0.9% lock flush 3 milliLiter(s) IV Push every 8 hours    MEDICATIONS  (PRN):  acetaminophen   Tablet. 650 milliGRAM(s) Oral every 6 hours PRN Mild Pain (1 - 3)                            13.9   10.00 )-----------( 270      ( 2017 06:30 )             40.5     07-24    138  |  100  |  9   ----------------------------<  122<H>  4.3   |  26  |  0.89    Ca    9.4      2017 06:30  Phos  3.8     -24  Mg     1.9               Radiologic Studies:
Subjective: I moved my bowels. I was able to urinate after the removal of the urinary catheter    Objective:   Vital Signs Last 24 Hrs  T(C): 36.8 (2017 10:49), Max: 37.3 (2017 18:32)  T(F): 98.3 (2017 10:49), Max: 99.2 (2017 18:32)  HR: 73 (2017 10:49) (56 - 73)  BP: 131/77 (2017 10:49) (113/63 - 142/74)  BP(mean): --  RR: 18 (2017 10:49) (15 - 18)  SpO2: 95% (2017 10:49) (95% - 100%)    Daily     Daily Weight in k (2017 01:13)    Heent: N/C, AT PER no scleral icterus, No JVD  Pul: clear  Cor: RRR  Abdomen: soft, normal BS. Wound - clean  Extremities: without edema, motor/sensory intact    I&O's Detail    2017 07:01  -  2017 07:00  --------------------------------------------------------  IN:  Total IN: 0 mL    OUT:    Indwelling Catheter - Urethral: 4300 mL  Total OUT: 4300 mL    Total NET: -4300 mL      2017 07:01  -  2017 11:47  --------------------------------------------------------  IN:  Total IN: 0 mL    OUT:    Voided: 200 mL  Total OUT: 200 mL    Total NET: -200 mL          MEDICATIONS  (STANDING):  metoprolol 50 milliGRAM(s) Oral two times a day  losartan 100 milliGRAM(s) Oral daily  latanoprost 0.005% Ophthalmic Solution 1 Drop(s) Both EYES at bedtime  docusate sodium 100 milliGRAM(s) Oral three times a day  amoxicillin  875 milliGRAM(s)/clavulanate 1 Tablet(s) Oral every 12 hours  enoxaparin Injectable 40 milliGRAM(s) SubCutaneous daily  tamsulosin 0.4 milliGRAM(s) Oral at bedtime  hydrochlorothiazide 25 milliGRAM(s) Oral daily  sodium chloride 0.9% lock flush 3 milliLiter(s) IV Push every 8 hours    MEDICATIONS  (PRN):  morphine  - Injectable 4 milliGRAM(s) IV Push every 4 hours PRN Severe Pain (7 - 10)  acetaminophen   Tablet. 650 milliGRAM(s) Oral every 6 hours PRN Mild Pain (1 - 3)                            13.6   8.77  )-----------( 261      ( 2017 06:25 )             41.5     07-23    139  |  99  |  7   ----------------------------<  118<H>  3.9   |  29  |  0.93    Ca    8.9      2017 06:25  Phos  3.5     07-22  Mg     1.8     07-22          Radiologic Studies:
Subjective: I passed gas but I have not walked yet because I feel weak    Objective:   Vital Signs Last 24 Hrs  T(C): 36.9 (2017 10:19), Max: 37.4 (2017 05:53)  T(F): 98.5 (2017 10:19), Max: 99.4 (2017 05:53)  HR: 62 (2017 10:19) (51 - 66)  BP: 127/72 (2017 10:19) (127/72 - 160/86)  BP(mean): --  RR: 18 (2017 10:19) (17 - 18)  SpO2: 96% (2017 10:19) (95% - 96%)    Daily     Daily Weight in k (2017 00:56)    Heent: N/C, AT PER no scleral icterus, No JVD  Pul: clear  Cor: RRR  Abdomen: obese, soft, normal BS. Wound - clean, umbilical dressing changed  Extremities: with stasis dermatitis, motor/sensory intact    I&O's Detail    2017 07:01  -  2017 07:00  --------------------------------------------------------  IN:    Oral Fluid: 240 mL  Total IN: 240 mL    OUT:    Indwelling Catheter - Urethral: 3545 mL    Voided: 1800 mL  Total OUT: 5345 mL    Total NET: -5105 mL      2017 07:01  -  2017 12:15  --------------------------------------------------------  IN:  Total IN: 0 mL    OUT:    Voided: 900 mL  Total OUT: 900 mL    Total NET: -900 mL          MEDICATIONS  (STANDING):  metoprolol 50 milliGRAM(s) Oral two times a day  losartan 100 milliGRAM(s) Oral daily  latanoprost 0.005% Ophthalmic Solution 1 Drop(s) Both EYES at bedtime  docusate sodium 100 milliGRAM(s) Oral three times a day  amoxicillin  875 milliGRAM(s)/clavulanate 1 Tablet(s) Oral every 12 hours  enoxaparin Injectable 40 milliGRAM(s) SubCutaneous daily  tamsulosin 0.4 milliGRAM(s) Oral at bedtime  hydrochlorothiazide 25 milliGRAM(s) Oral daily  sodium chloride 0.9% lock flush 3 milliLiter(s) IV Push every 8 hours    MEDICATIONS  (PRN):  oxyCODONE    5 mG/acetaminophen 325 mG 1 Tablet(s) Oral every 4 hours PRN Mild Pain (1 - 3)  oxyCODONE    5 mG/acetaminophen 325 mG 2 Tablet(s) Oral every 6 hours PRN Moderate Pain (4 - 6)  morphine  - Injectable 4 milliGRAM(s) IV Push every 4 hours PRN Severe Pain (7 - 10)                            13.4   8.80  )-----------( 259      ( 2017 06:00 )             40.4     07-22    141  |  104  |  8   ----------------------------<  112<H>  4.5   |  28  |  1.05    Ca    9.3      2017 06:00  Phos  3.5     07-22  Mg     1.8     07-    TPro  6.2  /  Alb  2.9<L>  /  TBili  0.2  /  DBili  x   /  AST  29  /  ALT  28  /  AlkPhos  74  07-21        Radiologic Studies:
Subjective: Pod # 1 post laparoscopic cholecystectomy and repair of umbilical hernia.  Had urinary retention and was started on Flomax. Awaiting voiding trial.    Objective:   Vital Signs Last 24 Hrs  T(C): 37.2 (2017 22:10), Max: 37.2 (2017 22:10)  T(F): 98.9 (2017 22:10), Max: 98.9 (2017 22:10)  HR: 51 (2017 22:10) (51 - 60)  BP: 140/74 (2017 22:10) (117/72 - 141/70)  BP(mean): --  RR: 17 (2017 22:10) (17 - 18)  SpO2: 96% (2017 22:10) (96% - 98%)    Daily     Daily Weight in k (2017 01:36)    Heent: N/C, AT PER no scleral icterus, No JVD  Pul: clear  Cor: RRR  Abdomen: Obese, but soft, normal BS. Wound - clean - dressing still in place.  Extremities: without edema, motor/sensory intact    I&O's Detail    2017 07:01  -  2017 07:00  --------------------------------------------------------  IN:    dextrose 5% + sodium chloride 0.45%.: 900 mL    Lactated Ringers IV Bolus: 1000 mL    lactated ringers.: 1100 mL    Oral Fluid: 300 mL  Total IN: 3300 mL    OUT:    Voided: 535 mL  Total OUT: 535 mL    Total NET: 2765 mL      2017 07:01  -  2017 23:44  --------------------------------------------------------  IN:  Total IN: 0 mL    OUT:    Indwelling Catheter - Urethral: 2345 mL    Voided: 1800 mL  Total OUT: 4145 mL    Total NET: -4145 mL          MEDICATIONS  (STANDING):  metoprolol 50 milliGRAM(s) Oral two times a day  losartan 100 milliGRAM(s) Oral daily  latanoprost 0.005% Ophthalmic Solution 1 Drop(s) Both EYES at bedtime  docusate sodium 100 milliGRAM(s) Oral three times a day  amoxicillin  875 milliGRAM(s)/clavulanate 1 Tablet(s) Oral every 12 hours  enoxaparin Injectable 40 milliGRAM(s) SubCutaneous daily  tamsulosin 0.4 milliGRAM(s) Oral at bedtime  hydrochlorothiazide 25 milliGRAM(s) Oral daily  sodium chloride 0.9% lock flush 3 milliLiter(s) IV Push every 8 hours    MEDICATIONS  (PRN):  oxyCODONE    5 mG/acetaminophen 325 mG 1 Tablet(s) Oral every 4 hours PRN Mild Pain (1 - 3)  oxyCODONE    5 mG/acetaminophen 325 mG 2 Tablet(s) Oral every 6 hours PRN Moderate Pain (4 - 6)  morphine  - Injectable 4 milliGRAM(s) IV Push every 4 hours PRN Severe Pain (7 - 10)                            12.7   9.46  )-----------( 243      ( 2017 09:23 )             38.2     07-21    139  |  102  |  11  ----------------------------<  147<H>  4.2   |  24  |  1.02    Ca    8.8      2017 09:23  Phos  3.8     07-20  Mg     1.8     -    TPro  6.2  /  Alb  2.9<L>  /  TBili  0.2  /  DBili  x   /  AST  29  /  ALT  28  /  AlkPhos  74  07-21    PT/INR - ( 2017 05:30 )   PT: 11.2 SEC;   INR: 1.00          PTT - ( 2017 05:30 )  PTT:42.8 SEC    Radiologic Studies:
STATUS POST: lap choley and Incarcerated umbilical hernia repair    POST OPERATIVE DAY #: 1    Vital Signs Last 24 Hrs  T(C): 36.7 (21 Jul 2017 05:47), Max: 36.7 (20 Jul 2017 15:04)  T(F): 98 (21 Jul 2017 05:47), Max: 98.1 (20 Jul 2017 19:36)  HR: 60 (21 Jul 2017 05:47) (51 - 72)  BP: 132/86 (21 Jul 2017 05:47) (120/47 - 154/78)  BP(mean): --  RR: 17 (21 Jul 2017 05:47) (12 - 19)  SpO2: 98% (21 Jul 2017 05:47) (95% - 98%)    SUBJECTIVE: Pt seen this morning, patient was oliguric over night but otherwise no acute complaint; no gas, no BM.     PHYSICAL EXAM:  Constitutional: Patient well nourish, well developed, resting in bed with no acute distress  Respiratory:  Lungs CTA, B/L, no rales , no wheezing, no rhonchi.  Cardiovascular:  S1, S2, RRR  Gastrointestinal: Abdomen soft, non distended, non tenderness  Extremities:  No edema, no calf tenderrness,  Neurological: AxAxOx3    I&O's Summary    20 Jul 2017 07:01  -  21 Jul 2017 07:00  --------------------------------------------------------  IN: 3300 mL / OUT: 535 mL / NET: 2765 mL    21 Jul 2017 07:01  -  21 Jul 2017 09:32  --------------------------------------------------------  IN: 0 mL / OUT: 820 mL / NET: -820 mL      I&O's Detail    20 Jul 2017 07:01  -  21 Jul 2017 07:00  --------------------------------------------------------  IN:    dextrose 5% + sodium chloride 0.45%.: 900 mL    Lactated Ringers IV Bolus: 1000 mL    lactated ringers.: 1100 mL    Oral Fluid: 300 mL  Total IN: 3300 mL    OUT:    Voided: 535 mL  Total OUT: 535 mL    Total NET: 2765 mL      21 Jul 2017 07:01  -  21 Jul 2017 09:32  --------------------------------------------------------  IN:  Total IN: 0 mL    OUT:    Indwelling Catheter - Urethral: 820 mL  Total OUT: 820 mL    Total NET: -820 mL        MEDICATIONS  (STANDING):  metoprolol 50 milliGRAM(s) Oral two times a day  losartan 100 milliGRAM(s) Oral daily  latanoprost 0.005% Ophthalmic Solution 1 Drop(s) Both EYES at bedtime  lactated ringers. 1000 milliLiter(s) (100 mL/Hr) IV Continuous <Continuous>  docusate sodium 100 milliGRAM(s) Oral three times a day  amoxicillin  875 milliGRAM(s)/clavulanate 1 Tablet(s) Oral every 12 hours  enoxaparin Injectable 40 milliGRAM(s) SubCutaneous daily  tamsulosin 0.4 milliGRAM(s) Oral at bedtime    MEDICATIONS  (PRN):  oxyCODONE    5 mG/acetaminophen 325 mG 1 Tablet(s) Oral every 4 hours PRN Mild Pain (1 - 3)  oxyCODONE    5 mG/acetaminophen 325 mG 2 Tablet(s) Oral every 6 hours PRN Moderate Pain (4 - 6)  morphine  - Injectable 4 milliGRAM(s) IV Push every 4 hours PRN Severe Pain (7 - 10)      LABS:                        13.2   6.59  )-----------( 235      ( 20 Jul 2017 05:30 )             39.3     07-20    141  |  104  |  10  ----------------------------<  121<H>  4.0   |  24  |  0.90    Ca    9.0      20 Jul 2017 05:30  Phos  3.8     07-20  Mg     2.0     07-20      PT/INR - ( 20 Jul 2017 05:30 )   PT: 11.2 SEC;   INR: 1.00          PTT - ( 20 Jul 2017 05:30 )  PTT:42.8 SEC

## 2017-07-25 NOTE — PROGRESS NOTE ADULT - PROVIDER SPECIALTY LIST ADULT
Surgery

## 2017-07-25 NOTE — PROGRESS NOTE ADULT - PROBLEM SELECTOR PLAN 1
S/P surgical repair  S/P Cholecystectomy  To be discharged from the hospital today, pending 's arrangement  Outpatient follow up with Dr. Carter

## 2017-07-25 NOTE — DISCHARGE NOTE ADULT - PLAN OF CARE
Return to normal GI function and activity WOUND CARE:  Please keep incisions clean and dry. Please do not Scrub or rub incisions. Do not use lotion or powder on incisions.   BATHING: Please do not submerge wound underwater. You may shower and/or sponge bathe.  ACTIVITY: No heavy lifting or straining. Otherwise, you may return to your usual level of physical activity. If you are taking narcotic pain medication (such as Percocet) DO NOT drive a car, operate machinery or make important decisions.  DIET: Return to your usual diet.  NOTIFY YOUR SURGEON IF: You have any bleeding that does not stop, any pus draining from your wound(s), any fever (over 100.4 F) or chills, persistent nausea/vomiting, persistent diarrhea, or if your pain is not controlled on your discharge pain medications.  FOLLOW-UP: Please follow up with your primary care physician in one week regarding your hospitalization. Please follow-up with your surgeon, within 7 days following discharge- please call to schedule an appointment. f/u Urology Clinic You were started on a medication called Flomax while in the hospital due to inability to urinate after your malhotra catheter was removed.  Please follow up in the Urology Clinic to evaluate if you still require this medication. continue to follow up in wound care clinic Please continue to follow up in your wound care clinic for dressing changes.

## 2017-07-25 NOTE — DISCHARGE NOTE ADULT - NS AS ACTIVITY OBS
Do not make important decisions/Do not drive or operate machinery/Showering allowed/No Heavy lifting/straining/Sex allowed

## 2017-07-27 LAB — SURGICAL PATHOLOGY STUDY: SIGNIFICANT CHANGE UP

## 2017-08-05 ENCOUNTER — INPATIENT (INPATIENT)
Facility: HOSPITAL | Age: 67
LOS: 3 days | Discharge: HOME | DRG: 602 | End: 2017-08-09
Attending: INTERNAL MEDICINE | Admitting: INTERNAL MEDICINE
Payer: COMMERCIAL

## 2017-08-05 VITALS
HEIGHT: 68 IN | OXYGEN SATURATION: 99 % | DIASTOLIC BLOOD PRESSURE: 82 MMHG | HEART RATE: 98 BPM | RESPIRATION RATE: 98 BRPM | WEIGHT: 190.04 LBS | SYSTOLIC BLOOD PRESSURE: 156 MMHG

## 2017-08-05 DIAGNOSIS — L03.115 CELLULITIS OF RIGHT LOWER LIMB: ICD-10-CM

## 2017-08-05 DIAGNOSIS — Z29.9 ENCOUNTER FOR PROPHYLACTIC MEASURES, UNSPECIFIED: ICD-10-CM

## 2017-08-05 DIAGNOSIS — I10 ESSENTIAL (PRIMARY) HYPERTENSION: ICD-10-CM

## 2017-08-05 DIAGNOSIS — M79.606 PAIN IN LEG, UNSPECIFIED: ICD-10-CM

## 2017-08-05 DIAGNOSIS — H40.9 UNSPECIFIED GLAUCOMA: ICD-10-CM

## 2017-08-05 DIAGNOSIS — L03.90 CELLULITIS, UNSPECIFIED: ICD-10-CM

## 2017-08-05 DIAGNOSIS — Z90.49 ACQUIRED ABSENCE OF OTHER SPECIFIED PARTS OF DIGESTIVE TRACT: Chronic | ICD-10-CM

## 2017-08-05 DIAGNOSIS — Z98.890 OTHER SPECIFIED POSTPROCEDURAL STATES: Chronic | ICD-10-CM

## 2017-08-05 LAB
ALBUMIN SERPL ELPH-MCNC: 3.2 G/DL — LOW (ref 3.5–5)
ALP SERPL-CCNC: 120 U/L — SIGNIFICANT CHANGE UP (ref 40–120)
ALT FLD-CCNC: 21 U/L DA — SIGNIFICANT CHANGE UP (ref 10–60)
ANION GAP SERPL CALC-SCNC: 6 MMOL/L — SIGNIFICANT CHANGE UP (ref 5–17)
AST SERPL-CCNC: 15 U/L — SIGNIFICANT CHANGE UP (ref 10–40)
BASOPHILS # BLD AUTO: 0.1 K/UL — SIGNIFICANT CHANGE UP (ref 0–0.2)
BASOPHILS NFR BLD AUTO: 1 % — SIGNIFICANT CHANGE UP (ref 0–2)
BILIRUB SERPL-MCNC: 0.3 MG/DL — SIGNIFICANT CHANGE UP (ref 0.2–1.2)
BUN SERPL-MCNC: 17 MG/DL — SIGNIFICANT CHANGE UP (ref 7–18)
CALCIUM SERPL-MCNC: 9.2 MG/DL — SIGNIFICANT CHANGE UP (ref 8.4–10.5)
CHLORIDE SERPL-SCNC: 103 MMOL/L — SIGNIFICANT CHANGE UP (ref 96–108)
CO2 SERPL-SCNC: 28 MMOL/L — SIGNIFICANT CHANGE UP (ref 22–31)
CREAT SERPL-MCNC: 0.99 MG/DL — SIGNIFICANT CHANGE UP (ref 0.5–1.3)
EOSINOPHIL # BLD AUTO: 0.2 K/UL — SIGNIFICANT CHANGE UP (ref 0–0.5)
EOSINOPHIL NFR BLD AUTO: 2 % — SIGNIFICANT CHANGE UP (ref 0–6)
GLUCOSE SERPL-MCNC: 119 MG/DL — HIGH (ref 70–99)
HCT VFR BLD CALC: 42.5 % — SIGNIFICANT CHANGE UP (ref 39–50)
HGB BLD-MCNC: 13.9 G/DL — SIGNIFICANT CHANGE UP (ref 13–17)
LACTATE SERPL-SCNC: 1.2 MMOL/L — SIGNIFICANT CHANGE UP (ref 0.7–2)
LYMPHOCYTES # BLD AUTO: 2.3 K/UL — SIGNIFICANT CHANGE UP (ref 1–3.3)
LYMPHOCYTES # BLD AUTO: 24 % — SIGNIFICANT CHANGE UP (ref 13–44)
MCHC RBC-ENTMCNC: 28.8 PG — SIGNIFICANT CHANGE UP (ref 27–34)
MCHC RBC-ENTMCNC: 32.7 GM/DL — SIGNIFICANT CHANGE UP (ref 32–36)
MCV RBC AUTO: 88.1 FL — SIGNIFICANT CHANGE UP (ref 80–100)
MONOCYTES # BLD AUTO: 0.8 K/UL — SIGNIFICANT CHANGE UP (ref 0–0.9)
MONOCYTES NFR BLD AUTO: 8.6 % — SIGNIFICANT CHANGE UP (ref 2–14)
NEUTROPHILS # BLD AUTO: 6.3 K/UL — SIGNIFICANT CHANGE UP (ref 1.8–7.4)
NEUTROPHILS NFR BLD AUTO: 64.3 % — SIGNIFICANT CHANGE UP (ref 43–77)
PLATELET # BLD AUTO: 302 K/UL — SIGNIFICANT CHANGE UP (ref 150–400)
POTASSIUM SERPL-MCNC: 3.7 MMOL/L — SIGNIFICANT CHANGE UP (ref 3.5–5.3)
POTASSIUM SERPL-SCNC: 3.7 MMOL/L — SIGNIFICANT CHANGE UP (ref 3.5–5.3)
PROT SERPL-MCNC: 7.8 G/DL — SIGNIFICANT CHANGE UP (ref 6–8.3)
RBC # BLD: 4.82 M/UL — SIGNIFICANT CHANGE UP (ref 4.2–5.8)
RBC # FLD: 12.8 % — SIGNIFICANT CHANGE UP (ref 10.3–14.5)
SODIUM SERPL-SCNC: 137 MMOL/L — SIGNIFICANT CHANGE UP (ref 135–145)
WBC # BLD: 9.8 K/UL — SIGNIFICANT CHANGE UP (ref 3.8–10.5)
WBC # FLD AUTO: 9.8 K/UL — SIGNIFICANT CHANGE UP (ref 3.8–10.5)

## 2017-08-05 PROCEDURE — 99285 EMERGENCY DEPT VISIT HI MDM: CPT

## 2017-08-05 RX ORDER — VANCOMYCIN HCL 1 G
1000 VIAL (EA) INTRAVENOUS EVERY 12 HOURS
Qty: 0 | Refills: 0 | Status: DISCONTINUED | OUTPATIENT
Start: 2017-08-05 | End: 2017-08-06

## 2017-08-05 RX ORDER — FUROSEMIDE 40 MG
80 TABLET ORAL ONCE
Qty: 0 | Refills: 0 | Status: COMPLETED | OUTPATIENT
Start: 2017-08-05 | End: 2017-08-05

## 2017-08-05 RX ORDER — ENOXAPARIN SODIUM 100 MG/ML
40 INJECTION SUBCUTANEOUS DAILY
Qty: 0 | Refills: 0 | Status: DISCONTINUED | OUTPATIENT
Start: 2017-08-05 | End: 2017-08-09

## 2017-08-05 RX ORDER — VANCOMYCIN HCL 1 G
1000 VIAL (EA) INTRAVENOUS ONCE
Qty: 0 | Refills: 0 | Status: COMPLETED | OUTPATIENT
Start: 2017-08-05 | End: 2017-08-05

## 2017-08-05 RX ORDER — LATANOPROST 0.05 MG/ML
1 SOLUTION/ DROPS OPHTHALMIC; TOPICAL AT BEDTIME
Qty: 0 | Refills: 0 | Status: DISCONTINUED | OUTPATIENT
Start: 2017-08-05 | End: 2017-08-09

## 2017-08-05 RX ORDER — HYDROCHLOROTHIAZIDE 25 MG
12.5 TABLET ORAL DAILY
Qty: 0 | Refills: 0 | Status: DISCONTINUED | OUTPATIENT
Start: 2017-08-05 | End: 2017-08-09

## 2017-08-05 RX ORDER — METOPROLOL TARTRATE 50 MG
12.5 TABLET ORAL
Qty: 0 | Refills: 0 | Status: DISCONTINUED | OUTPATIENT
Start: 2017-08-05 | End: 2017-08-09

## 2017-08-05 RX ADMIN — Medication 250 MILLIGRAM(S): at 17:41

## 2017-08-05 RX ADMIN — Medication 12.5 MILLIGRAM(S): at 22:48

## 2017-08-05 RX ADMIN — LATANOPROST 1 DROP(S): 0.05 SOLUTION/ DROPS OPHTHALMIC; TOPICAL at 22:49

## 2017-08-05 RX ADMIN — Medication 80 MILLIGRAM(S): at 17:39

## 2017-08-05 NOTE — H&P ADULT - HISTORY OF PRESENT ILLNESS
66 y/o M pt with PMHx of Cellulitis, HTN, Glaucoma, Chronic Leg Pain (b/l), and Lymphedema and PSHx of Cholecystectomy (x2 weeks ago; performed at [facility]) and Hernia Repair (x2 weeks ago; performed at [facility]) presents to ED c/o b/l leg swelling x2 days. Pt states having episodes of bowel incontinence since undergoing surgery x2 weeks ago; yesterday, pt had an episode of bowel incontinence, which required pt to put on a pair of jeans lent by a friend, however pt developed b/l leg swelling afterwards; pt believes swelling in b/l legs was caused by jeans. Pt denies fever, chills, or any other complaints. Pt notes taking Hydrochlorothiazide. Pt was last admitted to the hospital x2 weeks ago. Allergies: IV Contrast (hives). Vascular Physician: Dr. Rafal Marks. 67 Y/OM, homeless, walks without assistance, PMHx of Cellulitis, HTN, Glaucoma, Chronic Leg Pain (b/l), and Lymphedema ( 6 yrs, on and off uses ace wraps) and PSHx of Cholecystectomy (x2 weeks ago; performed at [facility]) and Hernia Repair (x2 weeks ago; performed at [facility]) presents to ED c/o b/l leg swelling x2 days. Pt states having episodes of bowel incontinence since undergoing surgery x2 weeks ago; initially he had constipation for 2 days, later given stool softeners, yesterday pt had an episode of bowel incontinence, which required pt to put on a pair of jeans lent by a friend, however pt developed b/l leg swelling afterwards; pt believes swelling in b/l legs was caused by jeans, hot weather , and also as he took out his ace wrap. Patient said he uses the antibiotic at home but he does not remember the name. He said usually he has the flare yup fo cellulitis usually in hot weather.   Denies fever, chills, chest pain, sob, trauma to leg, insect bite, sharp object injury abdominal pain, urinary or bowel complains.

## 2017-08-05 NOTE — H&P ADULT - PROBLEM SELECTOR PLAN 2
At home takes metoprolol , losartan, HCTz , but doses not remember by the patient   Will give low dose metoprolol 12.5 mg BID, HCTz low dose 12.5  Primary team to call pharmacy to confirm the dose

## 2017-08-05 NOTE — H&P ADULT - ASSESSMENT
67 Y/OM, homeless, walks without assistance, PMHx of Cellulitis, HTN, Glaucoma, Chronic Leg Pain (b/l), and Lymphedema ( 6 yrs, on and off uses ace wraps) and PSHx of Cholecystectomy (x2 weeks ago; performed at [facility]) and Hernia Repair (x2 weeks ago; performed at [facility]) presents to ED c/o b/l leg swelling x2 days.    In the ED vital signs stable, got 1 L bolus, 80 of lasix, vancomycin CXR clear, UA clear, EKG NSR at 65 BPM with premature atrial complexes, admitted to medicine floor for cellulitis of lower extremity

## 2017-08-05 NOTE — ED PROVIDER NOTE - OBJECTIVE STATEMENT
68 y/o M pt with PMHx of Cellulitis, HTN, Glaucoma, Chronic Leg Pain (b/l), and Lymphedema and PSHx of Cholecystectomy (x2 weeks ago; performed at [facility]) and Hernia Repair (x2 weeks ago; performed at [facility]) presents to ED c/o b/l leg swelling x2 days. Pt states having episodes of bowel incontinence since undergoing surgery x2 weeks ago; yesterday, pt had an episode of bowel incontinence, which required pt to put on a pair of jeans lent by a friend, however pt developed b/l leg swelling afterwards; pt believes swelling in b/l legs was caused by jeans. Pt denies fever, chills, or any other complaints. Pt notes taking Hydrochlorothiazide. Pt was last admitted to the hospital x2 weeks ago. Allergies: IV Contrast (hives). Vascular Physician: Dr. Rafal Marks.

## 2017-08-05 NOTE — H&P ADULT - NSHPREVIEWOFSYSTEMS_GEN_ALL_CORE
REVIEW OF SYSTEMS:    CONSTITUTIONAL: No fever,   EYES: no acute visual disturbances  NECK: No pain or stiffness  RESPIRATORY: No cough; No shortness of breath  CARDIOVASCULAR: No chest pain, no palpitations  GASTROINTESTINAL: No pain. No nausea or vomiting; No diarrhea   NEUROLOGICAL: No headache or numbness, no tremors  HEME/LYMPH: No  bleeding gums REVIEW OF SYSTEMS:    CONSTITUTIONAL: No fever,   EYES: no acute visual disturbances  NECK: No pain or stiffness  RESPIRATORY: No cough; No shortness of breath  CARDIOVASCULAR: No chest pain, no palpitations  GASTROINTESTINAL: No pain. No nausea or vomiting;  diarrhea x 2-3 days, no diarrhea in ed  NEUROLOGICAL: No headache or numbness, no tremors  Lower extremity pain

## 2017-08-05 NOTE — ED PROVIDER NOTE - MEDICAL DECISION MAKING DETAILS
66 y/o M pt presents with worsening lymphedema with skin breaks and discharge; initial cellulitis. Will treat, diurese, and admit for IV diuresis.

## 2017-08-05 NOTE — H&P ADULT - NSHPLABSRESULTS_GEN_ALL_CORE
Vital Signs Last 24 Hrs  T(C): --  T(F): --  HR: 18 (05 Aug 2017 16:01) (18 - 98)  BP: 156/82 (05 Aug 2017 16:01) (156/82 - 156/82)  BP(mean): --  RR: 18 (05 Aug 2017 16:01) (18 - 98)  SpO2: 99% (05 Aug 2017 16:01) (99% - 99%)                          13.9   9.8   )-----------( 302      ( 05 Aug 2017 17:24 )             42.5   08-05    137  |  103  |  17  ----------------------------<  119<H>  3.7   |  28  |  0.99    Ca    9.2      05 Aug 2017 17:24    TPro  7.8  /  Alb  3.2<L>  /  TBili  0.3  /  DBili  x   /  AST  15  /  ALT  21  /  AlkPhos  120  08-05

## 2017-08-05 NOTE — H&P ADULT - PROBLEM SELECTOR PLAN 1
Not septic  Cellulites of right lower extremity, R leg> left, warm, tender, erythema, skin tightening at shins, no open wounds, left leg erythema, tender, edema  S/p vancomycin, NS bolus  C/w vancomycin for now   ID DR Lim   F/up on blood cultures   vascular consult   F/up on lower extremity doppler to rule out DVT  patient was given 80of lasix by ed, but patient not sob, no congestion on cxr, monitor for now

## 2017-08-05 NOTE — ED ADULT NURSE NOTE - OBJECTIVE STATEMENT
BIBA from home c/o B/L LE swelling worsen in past 2 days, Pt axox3 ambulatory sustained redness to  b/l legs(R leg worse than L leg) skin discoloration  (purple, red) with multiple skin breaks, superficial blisters and abrasions with serosanguinous fluid and discharge.

## 2017-08-05 NOTE — H&P ADULT - NSHPPHYSICALEXAM_GEN_ALL_CORE
PHYSICAL EXAM:  GENERAL: NAD,   HEAD:  , Normocephalic  EYES:  conjunctiva and sclera clear  NECK: Supple, No JVD    NERVOUS SYSTEM:  Alert & Oriented X3,   CHEST/LUNG: Clear to auscuitation bilaterally;   HEART: S1 S2+;   ABDOMEN: Soft, Nontender, Nondistended; Bowel sounds present  EXTREMITIES: no cyanosis; no edema; no calf tenderness PHYSICAL EXAM:  GENERAL: NAD, obese  HEAD:  , Normocephalic  EYES:  conjunctiva and sclera clear  NECK: Supple, No JVD    NERVOUS SYSTEM:  Alert & Oriented X3,   CHEST/LUNG: Clear to auscultation bilaterally;   HEART: S1 S2+;   ABDOMEN: Soft, Nontender, Nondistended; Bowel sounds present, 2 surgery marks,   EXTREMITIES: Right leg > left                         Bilateral 3 + non pitting edema, erythema, dry skin, warmth , no open wounds                         Right leg taut red skin at shin

## 2017-08-05 NOTE — PATIENT PROFILE ADULT. - NS TRANSFER PATIENT BELONGINGS
t shirt, underwear 2 pair, 1 pair of shoes, , 1 pair shorts, 2 cell phones and /Cell Phone/PDA (specify)/Clothing

## 2017-08-05 NOTE — ED PROVIDER NOTE - SKIN WOUND DESCRIPTION
massive lymphedema on b/l legs, R leg worse than L leg; chronic skin changes (purple, red) with multiple skin breaks; superficial blisters and abrasions with serosanguinous fluid and discharge

## 2017-08-05 NOTE — H&P ADULT - PROBLEM SELECTOR PLAN 3
Supportive care for now, due to chronic lymphedema Supportive care for now, due to chronic lymphedema, patient uses ace wrap on and off  Consider vascular consult in am

## 2017-08-05 NOTE — PATIENT PROFILE ADULT. - VISION (WITH CORRECTIVE LENSES IF THE PATIENT USUALLY WEARS THEM):
wears glasses, has glaucoma, cataract surgery,/Partially impaired: cannot see medication labels or newsprint, but can see obstacles in path, and the surrounding layout; can count fingers at arm's length

## 2017-08-06 LAB
ALBUMIN SERPL ELPH-MCNC: 2.6 G/DL — LOW (ref 3.5–5)
ALP SERPL-CCNC: 96 U/L — SIGNIFICANT CHANGE UP (ref 40–120)
ALT FLD-CCNC: 20 U/L DA — SIGNIFICANT CHANGE UP (ref 10–60)
ANION GAP SERPL CALC-SCNC: 9 MMOL/L — SIGNIFICANT CHANGE UP (ref 5–17)
AST SERPL-CCNC: 16 U/L — SIGNIFICANT CHANGE UP (ref 10–40)
BASOPHILS # BLD AUTO: 0.1 K/UL — SIGNIFICANT CHANGE UP (ref 0–0.2)
BASOPHILS NFR BLD AUTO: 1.1 % — SIGNIFICANT CHANGE UP (ref 0–2)
BILIRUB SERPL-MCNC: 0.5 MG/DL — SIGNIFICANT CHANGE UP (ref 0.2–1.2)
BUN SERPL-MCNC: 15 MG/DL — SIGNIFICANT CHANGE UP (ref 7–18)
CALCIUM SERPL-MCNC: 8.5 MG/DL — SIGNIFICANT CHANGE UP (ref 8.4–10.5)
CHLORIDE SERPL-SCNC: 103 MMOL/L — SIGNIFICANT CHANGE UP (ref 96–108)
CHOLEST SERPL-MCNC: 145 MG/DL — SIGNIFICANT CHANGE UP (ref 10–199)
CO2 SERPL-SCNC: 29 MMOL/L — SIGNIFICANT CHANGE UP (ref 22–31)
CREAT SERPL-MCNC: 1.06 MG/DL — SIGNIFICANT CHANGE UP (ref 0.5–1.3)
EOSINOPHIL # BLD AUTO: 0.2 K/UL — SIGNIFICANT CHANGE UP (ref 0–0.5)
EOSINOPHIL NFR BLD AUTO: 2.2 % — SIGNIFICANT CHANGE UP (ref 0–6)
FOLATE SERPL-MCNC: 11.6 NG/ML — SIGNIFICANT CHANGE UP (ref 4.8–24.2)
GLUCOSE SERPL-MCNC: 122 MG/DL — HIGH (ref 70–99)
HBA1C BLD-MCNC: 6.6 % — HIGH (ref 4–5.6)
HCT VFR BLD CALC: 38.2 % — LOW (ref 39–50)
HDLC SERPL-MCNC: 56 MG/DL — SIGNIFICANT CHANGE UP (ref 40–125)
HGB BLD-MCNC: 12.9 G/DL — LOW (ref 13–17)
LIPID PNL WITH DIRECT LDL SERPL: 70 MG/DL — SIGNIFICANT CHANGE UP
LYMPHOCYTES # BLD AUTO: 2.2 K/UL — SIGNIFICANT CHANGE UP (ref 1–3.3)
LYMPHOCYTES # BLD AUTO: 23 % — SIGNIFICANT CHANGE UP (ref 13–44)
MAGNESIUM SERPL-MCNC: 1.6 MG/DL — SIGNIFICANT CHANGE UP (ref 1.6–2.6)
MCHC RBC-ENTMCNC: 29.3 PG — SIGNIFICANT CHANGE UP (ref 27–34)
MCHC RBC-ENTMCNC: 33.7 GM/DL — SIGNIFICANT CHANGE UP (ref 32–36)
MCV RBC AUTO: 87.1 FL — SIGNIFICANT CHANGE UP (ref 80–100)
MONOCYTES # BLD AUTO: 0.9 K/UL — SIGNIFICANT CHANGE UP (ref 0–0.9)
MONOCYTES NFR BLD AUTO: 9.2 % — SIGNIFICANT CHANGE UP (ref 2–14)
NEUTROPHILS # BLD AUTO: 6.3 K/UL — SIGNIFICANT CHANGE UP (ref 1.8–7.4)
NEUTROPHILS NFR BLD AUTO: 64.6 % — SIGNIFICANT CHANGE UP (ref 43–77)
PHOSPHATE SERPL-MCNC: 3.9 MG/DL — SIGNIFICANT CHANGE UP (ref 2.5–4.5)
PLATELET # BLD AUTO: 286 K/UL — SIGNIFICANT CHANGE UP (ref 150–400)
POTASSIUM SERPL-MCNC: 3.4 MMOL/L — LOW (ref 3.5–5.3)
POTASSIUM SERPL-SCNC: 3.4 MMOL/L — LOW (ref 3.5–5.3)
PROT SERPL-MCNC: 6.6 G/DL — SIGNIFICANT CHANGE UP (ref 6–8.3)
RBC # BLD: 4.39 M/UL — SIGNIFICANT CHANGE UP (ref 4.2–5.8)
RBC # FLD: 12.8 % — SIGNIFICANT CHANGE UP (ref 10.3–14.5)
SODIUM SERPL-SCNC: 141 MMOL/L — SIGNIFICANT CHANGE UP (ref 135–145)
TOTAL CHOLESTEROL/HDL RATIO MEASUREMENT: 2.6 RATIO — LOW (ref 3.4–9.6)
TRIGL SERPL-MCNC: 95 MG/DL — SIGNIFICANT CHANGE UP (ref 10–149)
TSH SERPL-MCNC: 0.44 UU/ML — SIGNIFICANT CHANGE UP (ref 0.34–4.82)
VIT B12 SERPL-MCNC: 230 PG/ML — LOW (ref 243–894)
WBC # BLD: 9.8 K/UL — SIGNIFICANT CHANGE UP (ref 3.8–10.5)
WBC # FLD AUTO: 9.8 K/UL — SIGNIFICANT CHANGE UP (ref 3.8–10.5)

## 2017-08-06 RX ORDER — VANCOMYCIN HCL 1 G
1000 VIAL (EA) INTRAVENOUS EVERY 12 HOURS
Qty: 0 | Refills: 0 | Status: DISCONTINUED | OUTPATIENT
Start: 2017-08-06 | End: 2017-08-07

## 2017-08-06 RX ORDER — LOSARTAN POTASSIUM 100 MG/1
25 TABLET, FILM COATED ORAL DAILY
Qty: 0 | Refills: 0 | Status: DISCONTINUED | OUTPATIENT
Start: 2017-08-06 | End: 2017-08-09

## 2017-08-06 RX ORDER — POTASSIUM CHLORIDE 20 MEQ
40 PACKET (EA) ORAL ONCE
Qty: 0 | Refills: 0 | Status: COMPLETED | OUTPATIENT
Start: 2017-08-06 | End: 2017-08-06

## 2017-08-06 RX ORDER — FUROSEMIDE 40 MG
20 TABLET ORAL DAILY
Qty: 0 | Refills: 0 | Status: DISCONTINUED | OUTPATIENT
Start: 2017-08-06 | End: 2017-08-09

## 2017-08-06 RX ORDER — TAMSULOSIN HYDROCHLORIDE 0.4 MG/1
0.4 CAPSULE ORAL AT BEDTIME
Qty: 0 | Refills: 0 | Status: DISCONTINUED | OUTPATIENT
Start: 2017-08-06 | End: 2017-08-09

## 2017-08-06 RX ADMIN — TAMSULOSIN HYDROCHLORIDE 0.4 MILLIGRAM(S): 0.4 CAPSULE ORAL at 21:51

## 2017-08-06 RX ADMIN — Medication 250 MILLIGRAM(S): at 17:39

## 2017-08-06 RX ADMIN — Medication 250 MILLIGRAM(S): at 06:40

## 2017-08-06 RX ADMIN — Medication 12.5 MILLIGRAM(S): at 06:39

## 2017-08-06 RX ADMIN — Medication 12.5 MILLIGRAM(S): at 17:39

## 2017-08-06 RX ADMIN — Medication 40 MILLIEQUIVALENT(S): at 12:44

## 2017-08-06 RX ADMIN — ENOXAPARIN SODIUM 40 MILLIGRAM(S): 100 INJECTION SUBCUTANEOUS at 12:44

## 2017-08-06 RX ADMIN — LATANOPROST 1 DROP(S): 0.05 SOLUTION/ DROPS OPHTHALMIC; TOPICAL at 21:51

## 2017-08-06 NOTE — CHART NOTE - NSCHARTNOTEFT_GEN_A_CORE
patient was seen and evaluated at bed side, please see resident doctor's H&P section for full information. IV ABS for recurrent cellulitis, B/L legs venous duplex study,  consult, patient is homeless. ID consult.

## 2017-08-06 NOTE — CONSULT NOTE ADULT - ASSESSMENT
right leg cellulitis ( more extensive)  left leg cellulitis- mild  plan - cont vanco 1gm iv q12 hrs for now will adjust based on trough  will check vanco trough tomorrow at 5 am

## 2017-08-06 NOTE — CONSULT NOTE ADULT - SUBJECTIVE AND OBJECTIVE BOX
HPI:  67 Y/OM, homeless, walks without assistance, PMHx of Cellulitis, HTN, Glaucoma, Chronic Leg Pain (b/l), and Lymphedema ( 6 yrs, on and off uses ace wraps) and PSHx of Cholecystectomy (x2 weeks ago; performed at [facility]) and Hernia Repair (x2 weeks ago; performed at [facility]) presents to ED c/o b/l leg swelling x2 days. Pt states having episodes of bowel incontinence since undergoing surgery x2 weeks ago;  Patient said he uses the antibiotic at home but he does not remember the name.    Denies fever, chills, chest pain, sob, trauma to leg, insect bite, sharp object injury abdominal pain, urinary or bowel complains.       PAST MEDICAL & SURGICAL HISTORY:  Glaucoma  HTN (hypertension)  Cellulitis  Lymphedema  Chronic leg pain: Bilateral  H/O hernia repair  S/P cholecystectomy      IV Contrast (Hives)      Meds:  metoprolol 12.5 milliGRAM(s) Oral two times a day  hydrochlorothiazide 12.5 milliGRAM(s) Oral daily  enoxaparin Injectable 40 milliGRAM(s) SubCutaneous daily  latanoprost 0.005% Ophthalmic Solution 1 Drop(s) Both EYES at bedtime  vancomycin  IVPB 1000 milliGRAM(s) IV Intermittent every 12 hours  potassium chloride    Tablet ER 40 milliEquivalent(s) Oral once  losartan 25 milliGRAM(s) Oral daily  furosemide    Tablet 20 milliGRAM(s) Oral daily  tamsulosin 0.4 milliGRAM(s) Oral at bedtime      SOCIAL HISTORY:  Smoker:    ETOH use:   Ilicit Drug use:      FAMILY HISTORY:      VITALS:  Vital Signs Last 24 Hrs  T(C): 36.8 (06 Aug 2017 05:10), Max: 36.8 (06 Aug 2017 05:10)  T(F): 98.2 (06 Aug 2017 05:10), Max: 98.2 (06 Aug 2017 05:10)  HR: 68 (06 Aug 2017 05:10) (18 - 98)  BP: 119/62 (06 Aug 2017 05:10) (119/62 - 156/82)  BP(mean): --  RR: 16 (06 Aug 2017 05:10) (16 - 98)  SpO2: 96% (06 Aug 2017 05:10) (96% - 99%)    LABS/DIAGNOSTIC TESTS:                          12.9   9.8   )-----------( 286      ( 06 Aug 2017 07:11 )             38.2     WBC Count: 9.8 K/uL (08-06 @ 07:11)  WBC Count: 9.8 K/uL (08-05 @ 17:24)      08-06    141  |  103  |  15  ----------------------------<  122<H>  3.4<L>   |  29  |  1.06    Ca    8.5      06 Aug 2017 07:11  Phos  3.9     08-06  Mg     1.6     08-06    TPro  6.6  /  Alb  2.6<L>  /  TBili  0.5  /  DBili  x   /  AST  16  /  ALT  20  /  AlkPhos  96  08-06          LIVER FUNCTIONS - ( 06 Aug 2017 07:11 )  Alb: 2.6 g/dL / Pro: 6.6 g/dL / ALK PHOS: 96 U/L / ALT: 20 U/L DA / AST: 16 U/L / GGT: x                 LACTATE:Lactate, Blood: 1.2 mmol/L (08-05 @ 17:24)      ABG -     CULTURES:       RADIOLOGY:HPI:  67 Y/OM, homeless, walks without assistance, PMHx of Cellulitis, HTN, Glaucoma, Chronic Leg Pain (b/l), and Lymphedema ( 6 yrs, on and off uses ace wraps) and PSHx of Cholecystectomy (x2 weeks ago; performed at [facility]) and Hernia Repair (x2 weeks ago; performed at [facility]) presents to ED c/o b/l leg swelling x2 days. Pt states having episodes of bowel incontinence since undergoing surgery x2 weeks ago; initially he had constipation for 2 days, later given stool softeners, yesterday pt had an episode of bowel incontinence, which required pt to put on a pair of jeans lent by a friend, however pt developed b/l leg swelling afterwards; pt believes swelling in b/l legs was caused by jeans, hot weather , and also as he took out his ace wrap. Patient said he uses the antibiotic at home but he does not remember the name. He said usually he has the flare yup fo cellulitis usually in hot weather.   Denies fever, chills, chest pain, sob, trauma to leg, insect bite, sharp object injury abdominal pain, urinary or bowel complains. (05 Aug 2017 20:49)      PAST MEDICAL & SURGICAL HISTORY:  Glaucoma  HTN (hypertension)  Cellulitis  Lymphedema  Chronic leg pain: Bilateral  H/O hernia repair  S/P cholecystectomy      IV Contrast (Hives)      Meds:  metoprolol 12.5 milliGRAM(s) Oral two times a day  hydrochlorothiazide 12.5 milliGRAM(s) Oral daily  enoxaparin Injectable 40 milliGRAM(s) SubCutaneous daily  latanoprost 0.005% Ophthalmic Solution 1 Drop(s) Both EYES at bedtime  vancomycin  IVPB 1000 milliGRAM(s) IV Intermittent every 12 hours  potassium chloride    Tablet ER 40 milliEquivalent(s) Oral once  losartan 25 milliGRAM(s) Oral daily  furosemide    Tablet 20 milliGRAM(s) Oral daily  tamsulosin 0.4 milliGRAM(s) Oral at bedtime      SOCIAL HISTORY:  Smoker:  YES / NO        PACK YEARS:                         WHEN QUIT?  ETOH use:  YES / NO               FREQUENCY / QUANTITY:  Ilicit Drug use:  YES / NO  Occupation:  Assisted device use (Cane / Walker):  Live with:    FAMILY HISTORY:      VITALS:  Vital Signs Last 24 Hrs  T(C): 36.8 (06 Aug 2017 05:10), Max: 36.8 (06 Aug 2017 05:10)  T(F): 98.2 (06 Aug 2017 05:10), Max: 98.2 (06 Aug 2017 05:10)  HR: 68 (06 Aug 2017 05:10) (18 - 98)  BP: 119/62 (06 Aug 2017 05:10) (119/62 - 156/82)  BP(mean): --  RR: 16 (06 Aug 2017 05:10) (16 - 98)  SpO2: 96% (06 Aug 2017 05:10) (96% - 99%)    LABS/DIAGNOSTIC TESTS:                          12.9   9.8   )-----------( 286      ( 06 Aug 2017 07:11 )             38.2     WBC Count: 9.8 K/uL (08-06 @ 07:11)  WBC Count: 9.8 K/uL (08-05 @ 17:24)      08-06    141  |  103  |  15  ----------------------------<  122<H>  3.4<L>   |  29  |  1.06    Ca    8.5      06 Aug 2017 07:11  Phos  3.9     08-06  Mg     1.6     08-06    TPro  6.6  /  Alb  2.6<L>  /  TBili  0.5  /  DBili  x   /  AST  16  /  ALT  20  /  AlkPhos  96  08-06          LIVER FUNCTIONS - ( 06 Aug 2017 07:11 )  Alb: 2.6 g/dL / Pro: 6.6 g/dL / ALK PHOS: 96 U/L / ALT: 20 U/L DA / AST: 16 U/L / GGT: x                 LACTATE:Lactate, Blood: 1.2 mmol/L (08-05 @ 17:24)      ABG -     CULTURES:       RADIOLOGY:      ROS  [  ] UNABLE TO ELICIT                    ROS  [  ] UNABLE TO ELICIT HPI:  67 Y/OM, homeless, walks without assistance, PMHx of Cellulitis, HTN, Glaucoma, Chronic Leg Pain (b/l), and Lymphedema ( 6 yrs, on and off uses ace wraps) and PSHx of Cholecystectomy (x2 weeks ago; performed at [facility]) and Hernia Repair (x2 weeks ago; performed at [facility]) presents to ED c/o b/l leg swelling x2 days. Pt states having episodes of bowel incontinence since undergoing surgery x2 weeks ago;  Patient said he uses the antibiotic at home but he does not remember the name.    Denies fever, chills, chest pain, sob, trauma to leg, insect bite, sharp object injury abdominal pain, urinary or bowel complains.   pt has mostly right leg swelling and redness,including his right foot, mild swelling of left leg and patchy areas of redness.      PAST MEDICAL & SURGICAL HISTORY:  Glaucoma  HTN (hypertension)  Cellulitis  Lymphedema  Chronic leg pain: Bilateral  H/O hernia repair  S/P cholecystectomy      IV Contrast (Hives)      Meds:  metoprolol 12.5 milliGRAM(s) Oral two times a day  hydrochlorothiazide 12.5 milliGRAM(s) Oral daily  enoxaparin Injectable 40 milliGRAM(s) SubCutaneous daily  latanoprost 0.005% Ophthalmic Solution 1 Drop(s) Both EYES at bedtime  vancomycin  IVPB 1000 milliGRAM(s) IV Intermittent every 12 hours  potassium chloride    Tablet ER 40 milliEquivalent(s) Oral once  losartan 25 milliGRAM(s) Oral daily  furosemide    Tablet 20 milliGRAM(s) Oral daily  tamsulosin 0.4 milliGRAM(s) Oral at bedtime      SOCIAL HISTORY:  Smoker: no  ETOH use: no  Ilicit Drug use: no    FAMILY HISTORY: not sig      VITALS:  Vital Signs Last 24 Hrs  T(C): 36.8 (06 Aug 2017 05:10), Max: 36.8 (06 Aug 2017 05:10)  T(F): 98.2 (06 Aug 2017 05:10), Max: 98.2 (06 Aug 2017 05:10)  HR: 68 (06 Aug 2017 05:10) (18 - 98)  BP: 119/62 (06 Aug 2017 05:10) (119/62 - 156/82)  BP(mean): --  RR: 16 (06 Aug 2017 05:10) (16 - 98)  SpO2: 96% (06 Aug 2017 05:10) (96% - 99%)    LABS/DIAGNOSTIC TESTS:                          12.9   9.8   )-----------( 286      ( 06 Aug 2017 07:11 )             38.2     WBC Count: 9.8 K/uL (08-06 @ 07:11)  WBC Count: 9.8 K/uL (08-05 @ 17:24)      08-06    141  |  103  |  15  ----------------------------<  122<H>  3.4<L>   |  29  |  1.06    Ca    8.5      06 Aug 2017 07:11  Phos  3.9     08-06  Mg     1.6     08-06    TPro  6.6  /  Alb  2.6<L>  /  TBili  0.5  /  DBili  x   /  AST  16  /  ALT  20  /  AlkPhos  96  08-06          LIVER FUNCTIONS - ( 06 Aug 2017 07:11 )  Alb: 2.6 g/dL / Pro: 6.6 g/dL / ALK PHOS: 96 U/L / ALT: 20 U/L DA / AST: 16 U/L / GGT: x                 LACTATE:Lactate, Blood: 1.2 mmol/L (08-05 @ 17:24)      ABG -           ROS  [  ] UNABLE TO ELICIT

## 2017-08-07 LAB
24R-OH-CALCIDIOL SERPL-MCNC: 39 NG/ML — SIGNIFICANT CHANGE UP (ref 30–100)
ANION GAP SERPL CALC-SCNC: 9 MMOL/L — SIGNIFICANT CHANGE UP (ref 5–17)
BUN SERPL-MCNC: 12 MG/DL — SIGNIFICANT CHANGE UP (ref 7–18)
CALCIUM SERPL-MCNC: 8.4 MG/DL — SIGNIFICANT CHANGE UP (ref 8.4–10.5)
CHLORIDE SERPL-SCNC: 104 MMOL/L — SIGNIFICANT CHANGE UP (ref 96–108)
CO2 SERPL-SCNC: 28 MMOL/L — SIGNIFICANT CHANGE UP (ref 22–31)
CREAT SERPL-MCNC: 0.91 MG/DL — SIGNIFICANT CHANGE UP (ref 0.5–1.3)
GLUCOSE SERPL-MCNC: 138 MG/DL — HIGH (ref 70–99)
HCT VFR BLD CALC: 39.6 % — SIGNIFICANT CHANGE UP (ref 39–50)
HGB BLD-MCNC: 13.4 G/DL — SIGNIFICANT CHANGE UP (ref 13–17)
MAGNESIUM SERPL-MCNC: 1.8 MG/DL — SIGNIFICANT CHANGE UP (ref 1.6–2.6)
MCHC RBC-ENTMCNC: 29.8 PG — SIGNIFICANT CHANGE UP (ref 27–34)
MCHC RBC-ENTMCNC: 34 GM/DL — SIGNIFICANT CHANGE UP (ref 32–36)
MCV RBC AUTO: 87.6 FL — SIGNIFICANT CHANGE UP (ref 80–100)
PHOSPHATE SERPL-MCNC: 3.5 MG/DL — SIGNIFICANT CHANGE UP (ref 2.5–4.5)
PLATELET # BLD AUTO: 269 K/UL — SIGNIFICANT CHANGE UP (ref 150–400)
POTASSIUM SERPL-MCNC: 3.4 MMOL/L — LOW (ref 3.5–5.3)
POTASSIUM SERPL-SCNC: 3.4 MMOL/L — LOW (ref 3.5–5.3)
RBC # BLD: 4.52 M/UL — SIGNIFICANT CHANGE UP (ref 4.2–5.8)
RBC # FLD: 12.8 % — SIGNIFICANT CHANGE UP (ref 10.3–14.5)
SODIUM SERPL-SCNC: 141 MMOL/L — SIGNIFICANT CHANGE UP (ref 135–145)
VANCOMYCIN TROUGH SERPL-MCNC: 7.9 UG/ML — LOW (ref 10–20)
WBC # BLD: 7.9 K/UL — SIGNIFICANT CHANGE UP (ref 3.8–10.5)
WBC # FLD AUTO: 7.9 K/UL — SIGNIFICANT CHANGE UP (ref 3.8–10.5)

## 2017-08-07 PROCEDURE — 93970 EXTREMITY STUDY: CPT | Mod: 26

## 2017-08-07 RX ORDER — DOCUSATE SODIUM 100 MG
100 CAPSULE ORAL ONCE
Qty: 0 | Refills: 0 | Status: COMPLETED | OUTPATIENT
Start: 2017-08-07 | End: 2017-08-07

## 2017-08-07 RX ORDER — VANCOMYCIN HCL 1 G
1250 VIAL (EA) INTRAVENOUS EVERY 12 HOURS
Qty: 0 | Refills: 0 | Status: DISCONTINUED | OUTPATIENT
Start: 2017-08-07 | End: 2017-08-09

## 2017-08-07 RX ORDER — SENNA PLUS 8.6 MG/1
1 TABLET ORAL ONCE
Qty: 0 | Refills: 0 | Status: COMPLETED | OUTPATIENT
Start: 2017-08-07 | End: 2017-08-07

## 2017-08-07 RX ORDER — ACETAMINOPHEN 500 MG
650 TABLET ORAL ONCE
Qty: 0 | Refills: 0 | Status: COMPLETED | OUTPATIENT
Start: 2017-08-07 | End: 2017-08-07

## 2017-08-07 RX ORDER — POTASSIUM CHLORIDE 20 MEQ
40 PACKET (EA) ORAL ONCE
Qty: 0 | Refills: 0 | Status: COMPLETED | OUTPATIENT
Start: 2017-08-07 | End: 2017-08-07

## 2017-08-07 RX ADMIN — Medication 12.5 MILLIGRAM(S): at 18:44

## 2017-08-07 RX ADMIN — Medication 650 MILLIGRAM(S): at 01:31

## 2017-08-07 RX ADMIN — Medication 166.67 MILLIGRAM(S): at 18:45

## 2017-08-07 RX ADMIN — Medication 12.5 MILLIGRAM(S): at 05:29

## 2017-08-07 RX ADMIN — LOSARTAN POTASSIUM 25 MILLIGRAM(S): 100 TABLET, FILM COATED ORAL at 05:29

## 2017-08-07 RX ADMIN — ENOXAPARIN SODIUM 40 MILLIGRAM(S): 100 INJECTION SUBCUTANEOUS at 14:02

## 2017-08-07 RX ADMIN — LATANOPROST 1 DROP(S): 0.05 SOLUTION/ DROPS OPHTHALMIC; TOPICAL at 21:55

## 2017-08-07 RX ADMIN — Medication 20 MILLIGRAM(S): at 05:29

## 2017-08-07 RX ADMIN — Medication 250 MILLIGRAM(S): at 06:33

## 2017-08-07 RX ADMIN — Medication 40 MILLIEQUIVALENT(S): at 14:02

## 2017-08-07 NOTE — PHYSICAL THERAPY INITIAL EVALUATION ADULT - PERTINENT HX OF CURRENT PROBLEM, REHAB EVAL
Pt. was brought in to ED due to chronic leg pain, cellulitis and lymphedema. Pt. is homeless at this time; Pt. is (I) at all levels;

## 2017-08-07 NOTE — PROGRESS NOTE ADULT - SUBJECTIVE AND OBJECTIVE BOX
HPI:  68 y/o male that's homeless is under our care for bilateral leg cellulitis. Patient states that right leg presentation has been improving with swelling and edema but still has persistent pain and oozing.  Has not had any fevers and wbc count is normal.  Vanco dose was increased due to low trough. Venous doppler of legs was negative for DVTs.    REVIEW OF SYSTEMS:  [  ] Not able to illicit  General: no fevers no malaise	  Chest: no cough no SOB  Skin: R>L leg burning  Musculoskeletal: right leg pain +leg swelling    ALLERGIES: IV Contrast (Hives)    Meds:  vancomycin  IVPB 1250 milliGRAM(s) IV Intermittent every 12 hours (8/05)    VITALS:  Vital Signs Last 24 Hrs  T(C): 36.9 (07 Aug 2017 14:25), Max: 36.9 (07 Aug 2017 14:25)  T(F): 98.4 (07 Aug 2017 14:25), Max: 98.4 (07 Aug 2017 14:25)  HR: 74 (07 Aug 2017 14:36) (65 - 74)  BP: 150/72 (07 Aug 2017 14:36) (133/74 - 150/72)  BP(mean): --  RR: 16 (07 Aug 2017 14:25) (16 - 17)  SpO2: 98% (07 Aug 2017 14:36) (96% - 99%)    PHYSICAL EXAM:  HEENT: n/a  Neck: supple no LN's  Respiratory: lungs clear  Cardiovascular: S1 S2 reg no murmurs  Gastrointestinal: +BS with soft, nontender abdomen; nondistended  Extremities: R>L LE edema - improving  Skin: noted lingering erythema and warmth of RLE, scattered areas of serous oozing  Ortho: n/a  Neuro: AAO x 3    LABS/DIAGNOSTIC TESTS:                        13.4   7.9   )-----------( 269      ( 07 Aug 2017 05:49 )             39.6   08-07    141  |  104  |  12  ----------------------------<  138<H>  3.4<L>   |  28  |  0.91    Ca    8.4      07 Aug 2017 05:49  Phos  3.5     08-07  Mg     1.8     08-07    TPro  6.6  /  Alb  2.6<L>  /  TBili  0.5  /  DBili  x   /  AST  16  /  ALT  20  /  AlkPhos  96  08-06     Vancomycin Level, Trough (08.07.17 @ 05:49)    Vancomycin Level, Trough: 7.9    CULTURES:  Culture - Blood (08.05.17 @ 23:19)    Specimen Source: .Blood Blood-Venous    Culture Results:   No growth to date.    Culture - Blood (08.05.17 @ 23:19)    Specimen Source: .Blood Blood-Venous    Culture Results:   No growth to date.    RADIOLOGY:  EXAM:  US DPLX LWR EXT VEINS COMPL BI                        PROCEDURE DATE:  08/07/2017    INTERPRETATION:  Venous duplex Doppler ultrasound of both legs    Clinical Indication: Leg swelling    Findings: Venous duplex Doppler ultrasound with gray scale, color and   spectral Doppler analysis of both legs is performed. The bilateral common   femoral veins, superficial femoral veins, profunda femoral veins,   popliteal veins and posterior tibial veins are compressible with normal   spectral Doppler phasicity and augmentation.    Impression: No evidence for deep vein thrombosis.

## 2017-08-07 NOTE — PROGRESS NOTE ADULT - PROBLEM SELECTOR PLAN 1
Not septic.  improving on vancomycin  vanc trough low, will increase to 1250 q12  SWER consult as homeless  Dr Lim following

## 2017-08-07 NOTE — PROGRESS NOTE ADULT - PROBLEM SELECTOR PLAN 3
Supportive care for now, due to chronic lymphedema, patient uses ace wrap on and off  venous duplex negative for dvt

## 2017-08-07 NOTE — PROGRESS NOTE ADULT - ASSESSMENT
67 Y/OM, homeless, walks without assistance, PMHx of Cellulitis, HTN, Glaucoma, Chronic Leg Pain (b/l), and Lymphedema ( 6 yrs, on and off uses ace wraps) and PSHx of Cholecystectomy (x2 weeks ago; performed at [facility]) and Hernia Repair (x2 weeks ago; performed at [facility]) presents to ED c/o b/l leg swelling x2 days 2/2 cellulitis

## 2017-08-07 NOTE — PROGRESS NOTE ADULT - SUBJECTIVE AND OBJECTIVE BOX
Patient is a 67y old  Male who presents with a chief complaint of swelling of legs (05 Aug 2017 20:49)      INTERVAL HPI/OVERNIGHT EVENTS:  no complaints  no events overnight    T(C): 36.9 (08-07-17 @ 14:25), Max: 36.9 (08-07-17 @ 14:25)  HR: 74 (08-07-17 @ 14:36) (65 - 74)  BP: 150/72 (08-07-17 @ 14:36) (133/74 - 150/72)  RR: 16 (08-07-17 @ 14:25) (16 - 17)  SpO2: 98% (08-07-17 @ 14:36) (96% - 99%)  Wt(kg): --  I&O's Summary      LABS:                        13.4   7.9   )-----------( 269      ( 07 Aug 2017 05:49 )             39.6     08-07    141  |  104  |  12  ----------------------------<  138<H>  3.4<L>   |  28  |  0.91    Ca    8.4      07 Aug 2017 05:49  Phos  3.5     08-07  Mg     1.8     08-07    TPro  6.6  /  Alb  2.6<L>  /  TBili  0.5  /  DBili  x   /  AST  16  /  ALT  20  /  AlkPhos  96  08-06        CAPILLARY BLOOD GLUCOSE      RADIOLOGY & ADDITIONAL TESTS:    Imaging Personally Reviewed:  [ ] YES  [ ] NO    Consultant(s) Notes Reviewed:  [ ] YES  [ ] NO    PHYSICAL EXAM:  GENERAL: NAD, well-groomed, well-developed  CHEST/LUNG: Clear to percussion bilaterally; No rales, rhonchi, wheezing, or rubs  HEART: Regular rate and rhythm; No murmurs, rubs, or gallops  ABDOMEN: Soft, Nontender, Nondistended; Bowel sounds present  EXTREMITIES:  chronic venous stasis changes, minimal redness left leg  LYMPH: No lymphadenopathy noted  SKIN: No rashes or lesions    Care Discussed with Consultants/Other Providers [ ] YES  [ ] NO

## 2017-08-07 NOTE — PROGRESS NOTE ADULT - ATTENDING COMMENTS
I agree with above
patient was seen and evaluated at bed side, agreed with above treatment plan,  consult.

## 2017-08-08 RX ADMIN — ENOXAPARIN SODIUM 40 MILLIGRAM(S): 100 INJECTION SUBCUTANEOUS at 11:23

## 2017-08-08 RX ADMIN — Medication 166.67 MILLIGRAM(S): at 17:13

## 2017-08-08 RX ADMIN — LOSARTAN POTASSIUM 25 MILLIGRAM(S): 100 TABLET, FILM COATED ORAL at 05:48

## 2017-08-08 RX ADMIN — Medication 20 MILLIGRAM(S): at 05:48

## 2017-08-08 RX ADMIN — Medication 12.5 MILLIGRAM(S): at 17:13

## 2017-08-08 RX ADMIN — Medication 12.5 MILLIGRAM(S): at 05:48

## 2017-08-08 RX ADMIN — Medication 166.67 MILLIGRAM(S): at 05:48

## 2017-08-08 RX ADMIN — LATANOPROST 1 DROP(S): 0.05 SOLUTION/ DROPS OPHTHALMIC; TOPICAL at 21:17

## 2017-08-08 NOTE — PROGRESS NOTE ADULT - ASSESSMENT
67 Y/OM, homeless, walks without assistance, PMHx of Cellulitis, HTN, Glaucoma, Chronic Leg Pain (b/l), and Lymphedema ( 6 yrs, on and off uses ace wraps) and PSHx of Cholecystectomy (x2 weeks ago; performed at [facility]) and Hernia Repair (x2 weeks ago; performed at [facility]) presents to ED c/o b/l leg swelling x2 days 2/2 cellulitis, DVT study negative, ID follow up.        Problem/Plan - 1:  ·  Problem: Cellulitis.  Plan: Not septic.  improving on vancomycin  vanc trough low, will increase to 1250 q12  SWER consult as homeless  Dr Lim following.     Problem/Plan - 2:  ·  Problem: HTN (hypertension).  Plan: Bp controlled  c/w bp meds.     Problem/Plan - 3:  ·  Problem: Chronic leg pain.  Plan: Supportive care for now, due to chronic lymphedema, patient uses ace wrap on and off  venous duplex negative for dvt.     Problem/Plan - 4:  ·  Problem: Glaucoma.  Plan: C/w home eye drops latanoprost.     Problem/Plan - 5:  ·  Problem: Need for prophylactic measure.  Plan: Improved VTE score is 1, 3 month risk is 0.6  C/w Lovenox  No need for GI prophylaxis.

## 2017-08-08 NOTE — PROGRESS NOTE ADULT - SUBJECTIVE AND OBJECTIVE BOX
HPI:  66 y/o male is under our care for leg cellulitis. Patient states that right leg swelling has improved, but still has lingering tenderness especially over the posterior aspect of right leg. Has not had any fevers.     REVIEW OF SYSTEMS:  [  ] Not able to illicit  General: no fevers no malaise	  Chest: no cough no SOB  Musculoskeletal: right leg pain +leg swelling    ALLERGIES: IV Contrast (Hives)    Meds:  vancomycin  IVPB 1250 milliGRAM(s) IV Intermittent every 12 hours (8/05)    VITALS:  Vital Signs Last 24 Hrs  T(C): 36.8 (08 Aug 2017 14:45), Max: 37.1 (07 Aug 2017 21:43)  T(F): 98.3 (08 Aug 2017 14:45), Max: 98.7 (07 Aug 2017 21:43)  HR: 67 (08 Aug 2017 14:45) (64 - 77)  BP: 136/72 (08 Aug 2017 14:45) (133/77 - 154/83)  BP(mean): --  RR: 16 (08 Aug 2017 14:45) (16 - 16)  SpO2: 96% (08 Aug 2017 14:45) (96% - 98%)    PHYSICAL EXAM:  HEENT: n/a  Neck: supple no LN's  Respiratory: lungs clear  Cardiovascular: S1 S2 reg no murmurs  Gastrointestinal: +BS with soft, nontender abdomen; nondistended  Extremities: improved RLE edema   Skin: improved erythema and warmth of RLE, increased skin wrinkling  Ortho: n/a  Neuro: AAO x 3    LABS/DIAGNOSTIC TESTS:  No new labs     Vancomycin Level, Trough (08.07.17 @ 05:49)    Vancomycin Level, Trough: 7.9    CULTURES:  Culture - Blood (08.05.17 @ 23:19)    Specimen Source: .Blood Blood-Venous    Culture Results:   No growth to date.    Culture - Blood (08.05.17 @ 23:19)    Specimen Source: .Blood Blood-Venous    Culture Results:   No growth to date.    RADIOLOGY: no new labs

## 2017-08-08 NOTE — PROGRESS NOTE ADULT - PROBLEM SELECTOR PLAN 1
Not septic.  improving on vancomycin  vanc trough low, will increase to 1250 q12 - will check trough in am  SWER consult as homeless - patient does not want to go to shelter, will return to streets, patient has capacity to make own decisions  Dr Lim following

## 2017-08-08 NOTE — CHART NOTE - NSCHARTNOTEFT_GEN_A_CORE
Upon Nutritional Assessment by the Registered Dietitian your patient was determined to meet criteria / has evidence of the following diagnosis/diagnoses:          [ ]  Mild Protein Calorie Malnutrition        [ ]  Moderate Protein Calorie Malnutrition        [x ] Severe Protein Calorie Malnutrition        [ ] Unspecified Protein Calorie Malnutrition        [ ] Underweight / BMI <19        [ ] Morbid Obesity / BMI > 40      Findings as based on:  •  Comprehensive nutrition assessment and consultation  •  Calorie counts (nutrient intake analysis)  •  Food acceptance and intake status from observations by staff  •  Follow up  •  Patient education  •  Intervention secondary to interdisciplinary rounds  •   concerns      Treatment:    The following diet has been recommended:      PROVIDER Section:     By signing this assessment you are acknowledging and agree with the diagnosis/diagnoses assigned by the Registered Dietitian    Comments:  continue with DASH/TLC ,lacto-ovo vegetarian diet , declined oral supplements         x

## 2017-08-08 NOTE — PROGRESS NOTE ADULT - SUBJECTIVE AND OBJECTIVE BOX
Patient is a 67y old  Male who presents with a chief complaint of swelling of legs (05 Aug 2017 20:49)      INTERVAL HPI/OVERNIGHT EVENTS:  patient without complaint  wants to leave tomorrow    T(C): 36.8 (08-08-17 @ 14:45), Max: 37.1 (08-07-17 @ 21:43)  HR: 67 (08-08-17 @ 14:45) (64 - 77)  BP: 136/72 (08-08-17 @ 14:45) (133/77 - 154/83)  RR: 16 (08-08-17 @ 14:45) (16 - 16)  SpO2: 96% (08-08-17 @ 14:45) (96% - 98%)  Wt(kg): --  I&O's Summary      LABS:                        13.4   7.9   )-----------( 269      ( 07 Aug 2017 05:49 )             39.6     08-07    141  |  104  |  12  ----------------------------<  138<H>  3.4<L>   |  28  |  0.91    Ca    8.4      07 Aug 2017 05:49  Phos  3.5     08-07  Mg     1.8     08-07          CAPILLARY BLOOD GLUCOSE        RADIOLOGY & ADDITIONAL TESTS:    Imaging Personally Reviewed:  [ ] YES  [ ] NO    Consultant(s) Notes Reviewed:  [ ] YES  [ ] NO    PHYSICAL EXAM:  GENERAL: NAD, well-groomed, well-developed  NERVOUS SYSTEM:  Alert & Oriented X3, Good concentration; Motor Strength 5/5 B/L upper and lower extremities  CHEST/LUNG: Clear to percussion bilaterally  HEART: Regular rate and rhythm; No murmurs, rubs, or gallops  ABDOMEN: Soft, Nontender, Nondistended; Bowel sounds present  EXTREMITIES:  + lymphedema bilaterally, decreased redness left leg    Care Discussed with Consultants/Other Providers [ ] YES  [ ] NO

## 2017-08-08 NOTE — DIETITIAN INITIAL EVALUATION ADULT. - OTHER INFO
Patient reports 3% wt loss from usual in 2 weeks. (121.8 to 177.7kg). skin-Bilateral lower extremity cellulitis Patient reports 3% wt loss from usual in 2 weeks. (121.8 to 117.7kg). skin-Bilateral lower extremity cellulitis

## 2017-08-08 NOTE — PROGRESS NOTE ADULT - SUBJECTIVE AND OBJECTIVE BOX
Patient is a 67y old  Male who presents with a chief complaint of swelling of legs (05 Aug 2017 20:49)      INTERVAL HPI/OVERNIGHT EVENTS:  T(C): 36.4 (08-08-17 @ 05:07), Max: 37.1 (08-07-17 @ 21:43)  HR: 64 (08-08-17 @ 05:07) (64 - 77)  BP: 133/77 (08-08-17 @ 05:07) (133/77 - 154/83)  RR: 16 (08-08-17 @ 05:07) (16 - 16)  SpO2: 96% (08-08-17 @ 05:07) (96% - 98%)  Wt(kg): --    LABS:                        13.4   7.9   )-----------( 269      ( 07 Aug 2017 05:49 )             39.6     08-07    141  |  104  |  12  ----------------------------<  138<H>  3.4<L>   |  28  |  0.91    Ca    8.4      07 Aug 2017 05:49  Phos  3.5     08-07  Mg     1.8     08-07          CAPILLARY BLOOD GLUCOSE    Culture - Blood (08.05.17 @ 23:19)    Specimen Source: .Blood Blood-Venous    Culture Results:   No growth to date.            RADIOLOGY & ADDITIONAL TESTS:  < from: US Duplex Venous Lower Ext Complete, Bilateral (08.07.17 @ 11:26) >  Impression: No evidence for deep vein thrombosis.     < end of copied text >    Consultant(s) Notes Reviewed:  [x ] YES  [ ] NO    PHYSICAL EXAM:  GENERAL: well built, well nourished  HEAD:  Atraumatic, Normocephalic  EYES: EOMI, PERRLA, conjunctiva and sclera clear  ENT: No tonsillar erythema, exudates, or enlargement; Moist mucous membranes, Good dentition, No lesions  NECK: Supple, No JVD, Normal thyroid, no enlarged nodes  NERVOUS SYSTEM:  Alert & Oriented X3, Good concentration; Motor Strength 5/5 B/L upper and lower extremities; DTRs 2+ intact and symmetric, sensory intact  CHEST/LUNG: B/L good air entry; No rales, rhonchi, or wheezing  HEART: S1S2 normal, no S3, Regular rate and rhythm; No murmurs, rubs, or gallops  ABDOMEN: Soft, Nontender, Nondistended; Bowel sounds present  EXTREMITIES:  2+ Peripheral Pulses, No clubbing, cyanosis, right leg swelling redness improving  LYMPH: No lymphadenopathy noted  SKIN: No rashes or lesions    Care Discussed with Consultants/Other Providers [ x] YES  [ ] NO

## 2017-08-08 NOTE — PROGRESS NOTE ADULT - ASSESSMENT
67 Y/OM, homeless, walks without assistance, PMHx of Cellulitis, HTN, Glaucoma, Chronic Leg Pain (b/l), and Lymphedema ( 6 yrs, on and off uses ace wraps) and PSHx of Cholecystectomy (x2 weeks ago; performed at [facility]) and Hernia Repair (x2 weeks ago; performed at [facility]) presents to ED c/o b/l leg swelling x2 days 2/2 cellulitis

## 2017-08-09 ENCOUNTER — TRANSCRIPTION ENCOUNTER (OUTPATIENT)
Age: 67
End: 2017-08-09

## 2017-08-09 VITALS
HEART RATE: 65 BPM | RESPIRATION RATE: 16 BRPM | OXYGEN SATURATION: 97 % | DIASTOLIC BLOOD PRESSURE: 80 MMHG | SYSTOLIC BLOOD PRESSURE: 151 MMHG | TEMPERATURE: 98 F

## 2017-08-09 LAB — VANCOMYCIN TROUGH SERPL-MCNC: 15 UG/ML — SIGNIFICANT CHANGE UP (ref 10–20)

## 2017-08-09 PROCEDURE — 82306 VITAMIN D 25 HYDROXY: CPT

## 2017-08-09 PROCEDURE — 93970 EXTREMITY STUDY: CPT

## 2017-08-09 PROCEDURE — 80202 ASSAY OF VANCOMYCIN: CPT

## 2017-08-09 PROCEDURE — 83036 HEMOGLOBIN GLYCOSYLATED A1C: CPT

## 2017-08-09 PROCEDURE — 85027 COMPLETE CBC AUTOMATED: CPT

## 2017-08-09 PROCEDURE — 84443 ASSAY THYROID STIM HORMONE: CPT

## 2017-08-09 PROCEDURE — 80048 BASIC METABOLIC PNL TOTAL CA: CPT

## 2017-08-09 PROCEDURE — 80053 COMPREHEN METABOLIC PANEL: CPT

## 2017-08-09 PROCEDURE — 97161 PT EVAL LOW COMPLEX 20 MIN: CPT

## 2017-08-09 PROCEDURE — 80061 LIPID PANEL: CPT

## 2017-08-09 PROCEDURE — 93005 ELECTROCARDIOGRAM TRACING: CPT

## 2017-08-09 PROCEDURE — 83735 ASSAY OF MAGNESIUM: CPT

## 2017-08-09 PROCEDURE — 82746 ASSAY OF FOLIC ACID SERUM: CPT

## 2017-08-09 PROCEDURE — 84100 ASSAY OF PHOSPHORUS: CPT

## 2017-08-09 PROCEDURE — 87040 BLOOD CULTURE FOR BACTERIA: CPT

## 2017-08-09 PROCEDURE — 83605 ASSAY OF LACTIC ACID: CPT

## 2017-08-09 PROCEDURE — 99285 EMERGENCY DEPT VISIT HI MDM: CPT | Mod: 25

## 2017-08-09 PROCEDURE — 96374 THER/PROPH/DIAG INJ IV PUSH: CPT

## 2017-08-09 PROCEDURE — 96375 TX/PRO/DX INJ NEW DRUG ADDON: CPT

## 2017-08-09 PROCEDURE — 82607 VITAMIN B-12: CPT

## 2017-08-09 RX ORDER — LATANOPROST 0.05 MG/ML
1 SOLUTION/ DROPS OPHTHALMIC; TOPICAL
Qty: 1 | Refills: 0 | OUTPATIENT
Start: 2017-08-09

## 2017-08-09 RX ORDER — METOPROLOL TARTRATE 50 MG
15 TABLET ORAL
Qty: 450 | Refills: 0 | OUTPATIENT
Start: 2017-08-09 | End: 2017-08-24

## 2017-08-09 RX ORDER — LOSARTAN POTASSIUM 100 MG/1
1 TABLET, FILM COATED ORAL
Qty: 15 | Refills: 0 | OUTPATIENT
Start: 2017-08-09 | End: 2017-08-24

## 2017-08-09 RX ORDER — HYDRALAZINE HCL 50 MG
1 TABLET ORAL
Qty: 30 | Refills: 0 | OUTPATIENT
Start: 2017-08-09 | End: 2017-08-24

## 2017-08-09 RX ORDER — TAMSULOSIN HYDROCHLORIDE 0.4 MG/1
1 CAPSULE ORAL
Qty: 15 | Refills: 0 | OUTPATIENT
Start: 2017-08-09 | End: 2017-08-24

## 2017-08-09 RX ORDER — FUROSEMIDE 40 MG
1 TABLET ORAL
Qty: 15 | Refills: 0 | OUTPATIENT
Start: 2017-08-09 | End: 2017-08-24

## 2017-08-09 RX ADMIN — Medication 12.5 MILLIGRAM(S): at 05:31

## 2017-08-09 RX ADMIN — LOSARTAN POTASSIUM 25 MILLIGRAM(S): 100 TABLET, FILM COATED ORAL at 05:31

## 2017-08-09 RX ADMIN — Medication 20 MILLIGRAM(S): at 05:31

## 2017-08-09 RX ADMIN — Medication 166.67 MILLIGRAM(S): at 05:31

## 2017-08-09 NOTE — DISCHARGE NOTE ADULT - HOSPITAL COURSE
67 Y/OM, homeless, walks without assistance, PMHx of Cellulitis, HTN, Glaucoma, Chronic Leg Pain (b/l), and Lymphedema  patient was admitted and treated with antibitics for cellulitis.    ID consulted and recommended po doxyclcyine x 2 days.  Stable for discharge

## 2017-08-09 NOTE — PROGRESS NOTE ADULT - ASSESSMENT
1.	Right leg cellulitis - improved  ·	dc planning today  ·	dc on doxycycline 100mg PO BID x 5 days  ·	reconsult prn

## 2017-08-09 NOTE — DISCHARGE NOTE ADULT - VISION (WITH CORRECTIVE LENSES IF THE PATIENT USUALLY WEARS THEM):
Partially impaired: cannot see medication labels or newsprint, but can see obstacles in path, and the surrounding layout; can count fingers at arm's length/wears glasses, has glaucoma, cataract surgery,

## 2017-08-09 NOTE — PROGRESS NOTE ADULT - ASSESSMENT
67 Y/OM, homeless, walks without assistance, PMHx of Cellulitis, HTN, Glaucoma, Chronic Leg Pain (b/l), and Lymphedema ( 6 yrs, on and off uses ace wraps) and PSHx of Cholecystectomy (x2 weeks ago; performed at [facility]) and Hernia Repair (x2 weeks ago; performed at [facility]) presents to ED c/o b/l leg swelling x2 days 2/2 cellulitis, DVT study negative, ID follow up.        Problem/Plan - 1:  ·  Problem: Cellulitis.  Plan: Not septic.  improving on vancomycin  vanc trough low, will increase to 1250 q12  SWER consult as homeless  Dr Lim following.     Problem/Plan - 2:  ·  Problem: HTN (hypertension).  Plan: Bp controlled  c/w bp meds.     Problem/Plan - 3:  ·  Problem: Chronic leg pain.  Plan: Supportive care for now, due to chronic lymphedema, patient uses ace wrap on and off  venous duplex negative for dvt.     Problem/Plan - 4:  ·  Problem: Glaucoma.  Plan: C/w home eye drops latanoprost.     Problem/Plan - 5:  ·  Problem: Need for prophylactic measure.  Plan: Improved VTE score is 1, 3 month risk is 0.6  C/w Lovenox  No need for GI prophylaxis.

## 2017-08-09 NOTE — DISCHARGE NOTE ADULT - MEDICATION SUMMARY - MEDICATIONS TO TAKE
I will START or STAY ON the medications listed below when I get home from the hospital:    losartan 100 mg oral tablet  -- 1 tab(s) by mouth once a day  -- Indication: For HTN (hypertension)    tamsulosin 0.4 mg oral capsule  -- 1 cap(s) by mouth once a day  -- Indication: For BPH    metoprolol tartrate 50 mg oral tablet  -- 1 tab(s) by mouth 2 times a day  -- Indication: For HTN (hypertension)    Lasix 20 mg oral tablet  -- 1 tab(s) by mouth once a day  -- Indication: For Hypertension    hydroCHLOROthiazide 25 mg oral tablet  -- 1 tab(s) by mouth once a day  -- Indication: For Hypertension    latanoprost 0.005% ophthalmic solution  -- 1 drop(s) to each affected eye once a day (at bedtime)  -- Indication: For Glaucoma    hydrALAZINE 100 mg oral tablet  -- 1 tab(s) by mouth 2 times a day  -- Indication: For Hypertension I will START or STAY ON the medications listed below when I get home from the hospital:    losartan 100 mg oral tablet  -- 1 tab(s) by mouth once a day  -- Indication: For HTN (hypertension)    tamsulosin 0.4 mg oral capsule  -- 1 cap(s) by mouth once a day  -- Indication: For BPH    doxycycline hyclate 100 mg oral tablet  -- 1 tab(s) by mouth 2 times a day  -- Indication: For Cellulitis    metoprolol tartrate 50 mg oral tablet  -- 1 tab(s) by mouth 2 times a day  -- Indication: For HTN (hypertension)    Lasix 20 mg oral tablet  -- 1 tab(s) by mouth once a day  -- Indication: For Hypertension    hydroCHLOROthiazide 25 mg oral tablet  -- 1 tab(s) by mouth once a day  -- Indication: For Hypertension    latanoprost 0.005% ophthalmic solution  -- 1 drop(s) to each affected eye once a day (at bedtime)  -- Indication: For Glaucoma    hydrALAZINE 100 mg oral tablet  -- 1 tab(s) by mouth 2 times a day  -- Indication: For Hypertension

## 2017-08-09 NOTE — PROGRESS NOTE ADULT - SUBJECTIVE AND OBJECTIVE BOX
Patient is a 67y old  Male who presents with a chief complaint of swelling of legs (05 Aug 2017 20:49)      INTERVAL HPI/OVERNIGHT EVENTS:  T(C): 36.6 (08-09-17 @ 05:27), Max: 36.8 (08-08-17 @ 14:45)  HR: 65 (08-09-17 @ 05:27) (65 - 68)  BP: 151/80 (08-09-17 @ 05:27) (136/72 - 151/80)  RR: 16 (08-09-17 @ 05:27) (16 - 16)  SpO2: 97% (08-09-17 @ 05:27) (96% - 97%)  Wt(kg): --    LABS:              CAPILLARY BLOOD GLUCOSE    Culture - Blood (08.05.17 @ 23:19)    Specimen Source: .Blood Blood-Venous    Culture Results:   No growth to date.            RADIOLOGY & ADDITIONAL TESTS:    Consultant(s) Notes Reviewed:  [x ] YES  [ ] NO    PHYSICAL EXAM:  GENERAL: well built, well nourished  HEAD:  Atraumatic, Normocephalic  EYES: EOMI, PERRLA, conjunctiva and sclera clear  ENT: No tonsillar erythema, exudates, or enlargement; Moist mucous membranes, Good dentition, No lesions  NECK: Supple, No JVD, Normal thyroid, no enlarged nodes  NERVOUS SYSTEM:  Alert & Oriented X3, Good concentration; Motor Strength 5/5 B/L upper and lower extremities; DTRs 2+ intact and symmetric, sensory intact  CHEST/LUNG: B/L good air entry; No rales, rhonchi, or wheezing  HEART: S1S2 normal, no S3, Regular rate and rhythm; No murmurs, rubs, or gallops  ABDOMEN: Soft, Nontender, Nondistended; Bowel sounds present  EXTREMITIES:  2+ Peripheral Pulses, No clubbing, right leg swelling redness improving  LYMPH: No lymphadenopathy noted  SKIN: No rashes or lesions    Care Discussed with Consultants/Other Providers [ x] YES  [ ] NO

## 2017-08-09 NOTE — PROGRESS NOTE ADULT - SUBJECTIVE AND OBJECTIVE BOX
HPI:  66 y/o male is under our care for RLE cellulitis. Patient states that edema of right leg continues to improve as well as the ulcer in the posterior aspect of leg. Has not had any fevers. Once patient showers he will have leg wrapped. Due for dc today on po antibiotics.    REVIEW OF SYSTEMS:  [  ] Not able to illicit  General: no fevers no malaise	  Chest: no cough no SOB  Musculoskeletal: right leg pain -improved leg swelling    ALLERGIES: IV Contrast (Hives)    Meds:  vancomycin  IVPB 1250 milliGRAM(s) IV Intermittent every 12 hours (8/05)    VITALS:  Vital Signs Last 24 Hrs  T(C): 36.6 (09 Aug 2017 05:27), Max: 36.8 (08 Aug 2017 14:45)  T(F): 97.8 (09 Aug 2017 05:27), Max: 98.3 (08 Aug 2017 14:45)  HR: 65 (09 Aug 2017 05:27) (65 - 68)  BP: 151/80 (09 Aug 2017 05:27) (136/72 - 151/80)  BP(mean): --  RR: 16 (09 Aug 2017 05:27) (16 - 16)  SpO2: 97% (09 Aug 2017 05:27) (96% - 97%)    PHYSICAL EXAM:  HEENT: n/a  Neck: supple no LN's  Respiratory: lungs clear  Cardiovascular: S1 S2 reg no murmurs  Gastrointestinal: +BS with soft, nontender abdomen; nondistended  Extremities: improved RLE edema  Skin: lingering mild erythema and faint warmth of RLE, increased skin wrinkling  Ortho: n/a  Neuro: AAO x 3    LABS/DIAGNOSTIC TESTS:  No new labs    Vancomycin Level, Trough (08.09.17 @ 04:24)    Vancomycin Level, Trough: 15.0    CULTURES:  Culture - Blood (08.05.17 @ 23:19)    Specimen Source: .Blood Blood-Venous    Culture Results:   No growth to date.    Culture - Blood (08.05.17 @ 23:19)    Specimen Source: .Blood Blood-Venous    Culture Results:   No growth to date.    RADIOLOGY: no new labs

## 2017-08-09 NOTE — DISCHARGE NOTE ADULT - PATIENT PORTAL LINK FT
“You can access the [employer] Patient Portal, offered by [facility], by registering with the following website: [email or web address]”

## 2017-08-11 DIAGNOSIS — Z59.0 HOMELESSNESS: ICD-10-CM

## 2017-08-11 DIAGNOSIS — I10 ESSENTIAL (PRIMARY) HYPERTENSION: ICD-10-CM

## 2017-08-11 DIAGNOSIS — L98.499 NON-PRESSURE CHRONIC ULCER OF SKIN OF OTHER SITES WITH UNSPECIFIED SEVERITY: ICD-10-CM

## 2017-08-11 DIAGNOSIS — H40.9 UNSPECIFIED GLAUCOMA: ICD-10-CM

## 2017-08-11 DIAGNOSIS — L03.116 CELLULITIS OF LEFT LOWER LIMB: ICD-10-CM

## 2017-08-11 DIAGNOSIS — E43 UNSPECIFIED SEVERE PROTEIN-CALORIE MALNUTRITION: ICD-10-CM

## 2017-08-11 DIAGNOSIS — Z90.49 ACQUIRED ABSENCE OF OTHER SPECIFIED PARTS OF DIGESTIVE TRACT: ICD-10-CM

## 2017-08-11 DIAGNOSIS — Z91.041 RADIOGRAPHIC DYE ALLERGY STATUS: ICD-10-CM

## 2017-08-11 DIAGNOSIS — R15.9 FULL INCONTINENCE OF FECES: ICD-10-CM

## 2017-08-11 DIAGNOSIS — I89.0 LYMPHEDEMA, NOT ELSEWHERE CLASSIFIED: ICD-10-CM

## 2017-08-11 DIAGNOSIS — L03.115 CELLULITIS OF RIGHT LOWER LIMB: ICD-10-CM

## 2017-08-11 SDOH — ECONOMIC STABILITY - HOUSING INSECURITY: HOMELESSNESS: Z59.0

## 2017-08-18 ENCOUNTER — APPOINTMENT (OUTPATIENT)
Dept: WOUND CARE | Facility: CLINIC | Age: 67
End: 2017-08-18
Payer: MEDICARE

## 2017-08-18 PROCEDURE — 99214 OFFICE O/P EST MOD 30 MIN: CPT

## 2017-08-25 ENCOUNTER — APPOINTMENT (OUTPATIENT)
Dept: WOUND CARE | Facility: CLINIC | Age: 67
End: 2017-08-25
Payer: MEDICARE

## 2017-08-25 DIAGNOSIS — L60.0 INGROWING NAIL: ICD-10-CM

## 2017-08-25 PROCEDURE — 99214 OFFICE O/P EST MOD 30 MIN: CPT

## 2017-08-28 PROBLEM — L60.0 INGROWING NAIL: Status: ACTIVE | Noted: 2017-05-26

## 2017-09-01 ENCOUNTER — APPOINTMENT (OUTPATIENT)
Dept: WOUND CARE | Facility: CLINIC | Age: 67
End: 2017-09-01
Payer: MEDICARE

## 2017-09-01 VITALS
SYSTOLIC BLOOD PRESSURE: 169 MMHG | DIASTOLIC BLOOD PRESSURE: 93 MMHG | TEMPERATURE: 97.6 F | HEART RATE: 65 BPM | RESPIRATION RATE: 16 BRPM

## 2017-09-01 PROCEDURE — 29580 STRAPPING UNNA BOOT: CPT | Mod: 50

## 2017-09-07 ENCOUNTER — APPOINTMENT (OUTPATIENT)
Dept: WOUND CARE | Facility: CLINIC | Age: 67
End: 2017-09-07
Payer: MEDICARE

## 2017-09-07 PROCEDURE — 99213 OFFICE O/P EST LOW 20 MIN: CPT

## 2017-09-15 ENCOUNTER — APPOINTMENT (OUTPATIENT)
Dept: WOUND CARE | Facility: CLINIC | Age: 67
End: 2017-09-15
Payer: MEDICARE

## 2017-09-15 PROCEDURE — 99214 OFFICE O/P EST MOD 30 MIN: CPT

## 2017-09-15 RX ORDER — HYDRALAZINE HYDROCHLORIDE 50 MG/1
50 TABLET ORAL
Refills: 0 | Status: DISCONTINUED | COMMUNITY
End: 2017-09-15

## 2017-09-15 RX ORDER — POTASSIUM CHLORIDE 10 MEQ
10 CAPSULE, EXTENDED RELEASE ORAL
Refills: 0 | Status: DISCONTINUED | COMMUNITY
End: 2017-09-15

## 2017-09-15 RX ORDER — CHOLECALCIFEROL (VITAMIN D3) 25 MCG
TABLET ORAL
Refills: 0 | Status: ACTIVE | COMMUNITY

## 2017-09-21 ENCOUNTER — APPOINTMENT (OUTPATIENT)
Dept: WOUND CARE | Facility: CLINIC | Age: 67
End: 2017-09-21
Payer: MEDICARE

## 2017-09-21 PROCEDURE — 29580 STRAPPING UNNA BOOT: CPT | Mod: 50

## 2017-09-21 RX ORDER — ASPIRIN 325 MG/1
TABLET, FILM COATED ORAL
Refills: 0 | Status: ACTIVE | COMMUNITY

## 2017-09-22 ENCOUNTER — APPOINTMENT (OUTPATIENT)
Dept: WOUND CARE | Facility: CLINIC | Age: 67
End: 2017-09-22

## 2017-09-29 ENCOUNTER — APPOINTMENT (OUTPATIENT)
Dept: WOUND CARE | Facility: CLINIC | Age: 67
End: 2017-09-29
Payer: MEDICARE

## 2017-09-29 VITALS
TEMPERATURE: 97.1 F | DIASTOLIC BLOOD PRESSURE: 72 MMHG | HEART RATE: 64 BPM | BODY MASS INDEX: 30.8 KG/M2 | WEIGHT: 220 LBS | HEIGHT: 71 IN | RESPIRATION RATE: 16 BRPM | SYSTOLIC BLOOD PRESSURE: 194 MMHG

## 2017-09-29 PROCEDURE — 29580 STRAPPING UNNA BOOT: CPT | Mod: 50

## 2017-10-06 ENCOUNTER — APPOINTMENT (OUTPATIENT)
Dept: WOUND CARE | Facility: CLINIC | Age: 67
End: 2017-10-06
Payer: MEDICARE

## 2017-10-06 PROCEDURE — 99214 OFFICE O/P EST MOD 30 MIN: CPT

## 2017-10-17 ENCOUNTER — APPOINTMENT (OUTPATIENT)
Dept: WOUND CARE | Facility: CLINIC | Age: 67
End: 2017-10-17

## 2017-10-20 ENCOUNTER — APPOINTMENT (OUTPATIENT)
Dept: WOUND CARE | Facility: CLINIC | Age: 67
End: 2017-10-20

## 2017-10-21 ENCOUNTER — INPATIENT (INPATIENT)
Facility: HOSPITAL | Age: 67
LOS: 4 days | Discharge: HOME | DRG: 603 | End: 2017-10-26
Attending: INTERNAL MEDICINE | Admitting: INTERNAL MEDICINE
Payer: COMMERCIAL

## 2017-10-21 VITALS
OXYGEN SATURATION: 96 % | DIASTOLIC BLOOD PRESSURE: 91 MMHG | SYSTOLIC BLOOD PRESSURE: 159 MMHG | WEIGHT: 270.07 LBS | TEMPERATURE: 98 F | HEIGHT: 71 IN | RESPIRATION RATE: 17 BRPM | HEART RATE: 92 BPM

## 2017-10-21 DIAGNOSIS — Z90.49 ACQUIRED ABSENCE OF OTHER SPECIFIED PARTS OF DIGESTIVE TRACT: Chronic | ICD-10-CM

## 2017-10-21 DIAGNOSIS — I10 ESSENTIAL (PRIMARY) HYPERTENSION: ICD-10-CM

## 2017-10-21 DIAGNOSIS — Z29.9 ENCOUNTER FOR PROPHYLACTIC MEASURES, UNSPECIFIED: ICD-10-CM

## 2017-10-21 DIAGNOSIS — L03.90 CELLULITIS, UNSPECIFIED: ICD-10-CM

## 2017-10-21 DIAGNOSIS — E87.6 HYPOKALEMIA: ICD-10-CM

## 2017-10-21 DIAGNOSIS — H40.9 UNSPECIFIED GLAUCOMA: ICD-10-CM

## 2017-10-21 DIAGNOSIS — Z98.890 OTHER SPECIFIED POSTPROCEDURAL STATES: Chronic | ICD-10-CM

## 2017-10-21 LAB
ALBUMIN SERPL ELPH-MCNC: 3 G/DL — LOW (ref 3.5–5)
ALBUMIN SERPL ELPH-MCNC: 3.1 G/DL — LOW (ref 3.5–5)
ALP SERPL-CCNC: 117 U/L — SIGNIFICANT CHANGE UP (ref 40–120)
ALP SERPL-CCNC: 133 U/L — HIGH (ref 40–120)
ALT FLD-CCNC: 17 U/L DA — SIGNIFICANT CHANGE UP (ref 10–60)
ALT FLD-CCNC: 20 U/L DA — SIGNIFICANT CHANGE UP (ref 10–60)
ANION GAP SERPL CALC-SCNC: 7 MMOL/L — SIGNIFICANT CHANGE UP (ref 5–17)
ANION GAP SERPL CALC-SCNC: 8 MMOL/L — SIGNIFICANT CHANGE UP (ref 5–17)
AST SERPL-CCNC: 14 U/L — SIGNIFICANT CHANGE UP (ref 10–40)
AST SERPL-CCNC: 17 U/L — SIGNIFICANT CHANGE UP (ref 10–40)
BASOPHILS # BLD AUTO: 0.1 K/UL — SIGNIFICANT CHANGE UP (ref 0–0.2)
BASOPHILS NFR BLD AUTO: 1.2 % — SIGNIFICANT CHANGE UP (ref 0–2)
BILIRUB DIRECT SERPL-MCNC: 0.1 MG/DL — SIGNIFICANT CHANGE UP (ref 0–0.2)
BILIRUB INDIRECT FLD-MCNC: 0.4 MG/DL — SIGNIFICANT CHANGE UP (ref 0.2–1)
BILIRUB SERPL-MCNC: 0.3 MG/DL — SIGNIFICANT CHANGE UP (ref 0.2–1.2)
BILIRUB SERPL-MCNC: 0.5 MG/DL — SIGNIFICANT CHANGE UP (ref 0.2–1.2)
BUN SERPL-MCNC: 12 MG/DL — SIGNIFICANT CHANGE UP (ref 7–18)
BUN SERPL-MCNC: 13 MG/DL — SIGNIFICANT CHANGE UP (ref 7–18)
CALCIUM SERPL-MCNC: 8.8 MG/DL — SIGNIFICANT CHANGE UP (ref 8.4–10.5)
CALCIUM SERPL-MCNC: 8.9 MG/DL — SIGNIFICANT CHANGE UP (ref 8.4–10.5)
CHLORIDE SERPL-SCNC: 102 MMOL/L — SIGNIFICANT CHANGE UP (ref 96–108)
CHLORIDE SERPL-SCNC: 103 MMOL/L — SIGNIFICANT CHANGE UP (ref 96–108)
CHOLEST SERPL-MCNC: 137 MG/DL — SIGNIFICANT CHANGE UP (ref 10–199)
CO2 SERPL-SCNC: 27 MMOL/L — SIGNIFICANT CHANGE UP (ref 22–31)
CO2 SERPL-SCNC: 29 MMOL/L — SIGNIFICANT CHANGE UP (ref 22–31)
CREAT SERPL-MCNC: 1 MG/DL — SIGNIFICANT CHANGE UP (ref 0.5–1.3)
CREAT SERPL-MCNC: 1.08 MG/DL — SIGNIFICANT CHANGE UP (ref 0.5–1.3)
EOSINOPHIL # BLD AUTO: 0.2 K/UL — SIGNIFICANT CHANGE UP (ref 0–0.5)
EOSINOPHIL NFR BLD AUTO: 2.6 % — SIGNIFICANT CHANGE UP (ref 0–6)
GLUCOSE BLDC GLUCOMTR-MCNC: 107 MG/DL — HIGH (ref 70–99)
GLUCOSE BLDC GLUCOMTR-MCNC: 140 MG/DL — HIGH (ref 70–99)
GLUCOSE BLDC GLUCOMTR-MCNC: 143 MG/DL — HIGH (ref 70–99)
GLUCOSE BLDC GLUCOMTR-MCNC: 166 MG/DL — HIGH (ref 70–99)
GLUCOSE SERPL-MCNC: 161 MG/DL — HIGH (ref 70–99)
GLUCOSE SERPL-MCNC: 181 MG/DL — HIGH (ref 70–99)
HCT VFR BLD CALC: 43.7 % — SIGNIFICANT CHANGE UP (ref 39–50)
HCT VFR BLD CALC: 44.9 % — SIGNIFICANT CHANGE UP (ref 39–50)
HDLC SERPL-MCNC: 65 MG/DL — SIGNIFICANT CHANGE UP (ref 40–125)
HGB BLD-MCNC: 13.7 G/DL — SIGNIFICANT CHANGE UP (ref 13–17)
HGB BLD-MCNC: 14 G/DL — SIGNIFICANT CHANGE UP (ref 13–17)
LACTATE SERPL-SCNC: 1.8 MMOL/L — SIGNIFICANT CHANGE UP (ref 0.7–2)
LACTATE SERPL-SCNC: 3 MMOL/L — HIGH (ref 0.7–2)
LIPID PNL WITH DIRECT LDL SERPL: 56 MG/DL — SIGNIFICANT CHANGE UP
LYMPHOCYTES # BLD AUTO: 2 K/UL — SIGNIFICANT CHANGE UP (ref 1–3.3)
LYMPHOCYTES # BLD AUTO: 22.1 % — SIGNIFICANT CHANGE UP (ref 13–44)
MAGNESIUM SERPL-MCNC: 1.8 MG/DL — SIGNIFICANT CHANGE UP (ref 1.6–2.6)
MCHC RBC-ENTMCNC: 29.3 PG — SIGNIFICANT CHANGE UP (ref 27–34)
MCHC RBC-ENTMCNC: 29.3 PG — SIGNIFICANT CHANGE UP (ref 27–34)
MCHC RBC-ENTMCNC: 31.2 GM/DL — LOW (ref 32–36)
MCHC RBC-ENTMCNC: 31.3 GM/DL — LOW (ref 32–36)
MCV RBC AUTO: 93.6 FL — SIGNIFICANT CHANGE UP (ref 80–100)
MCV RBC AUTO: 93.9 FL — SIGNIFICANT CHANGE UP (ref 80–100)
MONOCYTES # BLD AUTO: 0.7 K/UL — SIGNIFICANT CHANGE UP (ref 0–0.9)
MONOCYTES NFR BLD AUTO: 8 % — SIGNIFICANT CHANGE UP (ref 2–14)
NEUTROPHILS # BLD AUTO: 6.1 K/UL — SIGNIFICANT CHANGE UP (ref 1.8–7.4)
NEUTROPHILS NFR BLD AUTO: 66 % — SIGNIFICANT CHANGE UP (ref 43–77)
PHOSPHATE SERPL-MCNC: 2.6 MG/DL — SIGNIFICANT CHANGE UP (ref 2.5–4.5)
PLATELET # BLD AUTO: 284 K/UL — SIGNIFICANT CHANGE UP (ref 150–400)
PLATELET # BLD AUTO: 312 K/UL — SIGNIFICANT CHANGE UP (ref 150–400)
POTASSIUM SERPL-MCNC: 3.1 MMOL/L — LOW (ref 3.5–5.3)
POTASSIUM SERPL-MCNC: 3.8 MMOL/L — SIGNIFICANT CHANGE UP (ref 3.5–5.3)
POTASSIUM SERPL-SCNC: 3.1 MMOL/L — LOW (ref 3.5–5.3)
POTASSIUM SERPL-SCNC: 3.8 MMOL/L — SIGNIFICANT CHANGE UP (ref 3.5–5.3)
PROT SERPL-MCNC: 7.3 G/DL — SIGNIFICANT CHANGE UP (ref 6–8.3)
PROT SERPL-MCNC: 7.5 G/DL — SIGNIFICANT CHANGE UP (ref 6–8.3)
RBC # BLD: 4.68 M/UL — SIGNIFICANT CHANGE UP (ref 4.2–5.8)
RBC # BLD: 4.78 M/UL — SIGNIFICANT CHANGE UP (ref 4.2–5.8)
RBC # FLD: 14.3 % — SIGNIFICANT CHANGE UP (ref 10.3–14.5)
RBC # FLD: 14.7 % — HIGH (ref 10.3–14.5)
SODIUM SERPL-SCNC: 138 MMOL/L — SIGNIFICANT CHANGE UP (ref 135–145)
SODIUM SERPL-SCNC: 138 MMOL/L — SIGNIFICANT CHANGE UP (ref 135–145)
TOTAL CHOLESTEROL/HDL RATIO MEASUREMENT: 2.1 RATIO — LOW (ref 3.4–9.6)
TRIGL SERPL-MCNC: 78 MG/DL — SIGNIFICANT CHANGE UP (ref 10–149)
TSH SERPL-MCNC: 0.8 UU/ML — SIGNIFICANT CHANGE UP (ref 0.34–4.82)
WBC # BLD: 10 K/UL — SIGNIFICANT CHANGE UP (ref 3.8–10.5)
WBC # BLD: 9.2 K/UL — SIGNIFICANT CHANGE UP (ref 3.8–10.5)
WBC # FLD AUTO: 10 K/UL — SIGNIFICANT CHANGE UP (ref 3.8–10.5)
WBC # FLD AUTO: 9.2 K/UL — SIGNIFICANT CHANGE UP (ref 3.8–10.5)

## 2017-10-21 PROCEDURE — 93970 EXTREMITY STUDY: CPT | Mod: 26

## 2017-10-21 PROCEDURE — 99285 EMERGENCY DEPT VISIT HI MDM: CPT | Mod: 25

## 2017-10-21 RX ORDER — VANCOMYCIN HCL 1 G
1250 VIAL (EA) INTRAVENOUS EVERY 12 HOURS
Qty: 0 | Refills: 0 | Status: DISCONTINUED | OUTPATIENT
Start: 2017-10-21 | End: 2017-10-22

## 2017-10-21 RX ORDER — CEFAZOLIN SODIUM 1 G
2000 VIAL (EA) INJECTION ONCE
Qty: 0 | Refills: 0 | Status: COMPLETED | OUTPATIENT
Start: 2017-10-21 | End: 2017-10-21

## 2017-10-21 RX ORDER — SODIUM CHLORIDE 9 MG/ML
1000 INJECTION INTRAMUSCULAR; INTRAVENOUS; SUBCUTANEOUS
Qty: 0 | Refills: 0 | Status: DISCONTINUED | OUTPATIENT
Start: 2017-10-21 | End: 2017-10-26

## 2017-10-21 RX ORDER — HYDRALAZINE HCL 50 MG
100 TABLET ORAL
Qty: 0 | Refills: 0 | Status: DISCONTINUED | OUTPATIENT
Start: 2017-10-21 | End: 2017-10-23

## 2017-10-21 RX ORDER — INSULIN LISPRO 100/ML
VIAL (ML) SUBCUTANEOUS
Qty: 0 | Refills: 0 | Status: DISCONTINUED | OUTPATIENT
Start: 2017-10-21 | End: 2017-10-26

## 2017-10-21 RX ORDER — METOPROLOL TARTRATE 50 MG
50 TABLET ORAL
Qty: 0 | Refills: 0 | Status: DISCONTINUED | OUTPATIENT
Start: 2017-10-21 | End: 2017-10-26

## 2017-10-21 RX ORDER — LATANOPROST 0.05 MG/ML
1 SOLUTION/ DROPS OPHTHALMIC; TOPICAL AT BEDTIME
Qty: 0 | Refills: 0 | Status: DISCONTINUED | OUTPATIENT
Start: 2017-10-21 | End: 2017-10-26

## 2017-10-21 RX ORDER — FUROSEMIDE 40 MG
20 TABLET ORAL DAILY
Qty: 0 | Refills: 0 | Status: DISCONTINUED | OUTPATIENT
Start: 2017-10-21 | End: 2017-10-26

## 2017-10-21 RX ORDER — POTASSIUM CHLORIDE 20 MEQ
40 PACKET (EA) ORAL EVERY 4 HOURS
Qty: 0 | Refills: 0 | Status: COMPLETED | OUTPATIENT
Start: 2017-10-21 | End: 2017-10-21

## 2017-10-21 RX ORDER — VANCOMYCIN HCL 1 G
1000 VIAL (EA) INTRAVENOUS ONCE
Qty: 0 | Refills: 0 | Status: COMPLETED | OUTPATIENT
Start: 2017-10-21 | End: 2017-10-21

## 2017-10-21 RX ORDER — DEXTROSE 50 % IN WATER 50 %
25 SYRINGE (ML) INTRAVENOUS ONCE
Qty: 0 | Refills: 0 | Status: DISCONTINUED | OUTPATIENT
Start: 2017-10-21 | End: 2017-10-26

## 2017-10-21 RX ORDER — SODIUM CHLORIDE 9 MG/ML
1000 INJECTION, SOLUTION INTRAVENOUS
Qty: 0 | Refills: 0 | Status: DISCONTINUED | OUTPATIENT
Start: 2017-10-21 | End: 2017-10-26

## 2017-10-21 RX ORDER — ACETAMINOPHEN 500 MG
650 TABLET ORAL EVERY 6 HOURS
Qty: 0 | Refills: 0 | Status: DISCONTINUED | OUTPATIENT
Start: 2017-10-21 | End: 2017-10-26

## 2017-10-21 RX ORDER — SODIUM CHLORIDE 9 MG/ML
1000 INJECTION INTRAMUSCULAR; INTRAVENOUS; SUBCUTANEOUS ONCE
Qty: 0 | Refills: 0 | Status: COMPLETED | OUTPATIENT
Start: 2017-10-21 | End: 2017-10-21

## 2017-10-21 RX ORDER — SODIUM CHLORIDE 9 MG/ML
3 INJECTION INTRAMUSCULAR; INTRAVENOUS; SUBCUTANEOUS ONCE
Qty: 0 | Refills: 0 | Status: COMPLETED | OUTPATIENT
Start: 2017-10-21 | End: 2017-10-21

## 2017-10-21 RX ORDER — TAMSULOSIN HYDROCHLORIDE 0.4 MG/1
0.4 CAPSULE ORAL AT BEDTIME
Qty: 0 | Refills: 0 | Status: DISCONTINUED | OUTPATIENT
Start: 2017-10-21 | End: 2017-10-26

## 2017-10-21 RX ORDER — ENOXAPARIN SODIUM 100 MG/ML
40 INJECTION SUBCUTANEOUS DAILY
Qty: 0 | Refills: 0 | Status: DISCONTINUED | OUTPATIENT
Start: 2017-10-21 | End: 2017-10-26

## 2017-10-21 RX ORDER — GLUCAGON INJECTION, SOLUTION 0.5 MG/.1ML
1 INJECTION, SOLUTION SUBCUTANEOUS ONCE
Qty: 0 | Refills: 0 | Status: DISCONTINUED | OUTPATIENT
Start: 2017-10-21 | End: 2017-10-26

## 2017-10-21 RX ORDER — DEXTROSE 50 % IN WATER 50 %
12.5 SYRINGE (ML) INTRAVENOUS ONCE
Qty: 0 | Refills: 0 | Status: DISCONTINUED | OUTPATIENT
Start: 2017-10-21 | End: 2017-10-26

## 2017-10-21 RX ORDER — MORPHINE SULFATE 50 MG/1
2 CAPSULE, EXTENDED RELEASE ORAL EVERY 6 HOURS
Qty: 0 | Refills: 0 | Status: DISCONTINUED | OUTPATIENT
Start: 2017-10-21 | End: 2017-10-25

## 2017-10-21 RX ORDER — OXYCODONE AND ACETAMINOPHEN 5; 325 MG/1; MG/1
1 TABLET ORAL EVERY 6 HOURS
Qty: 0 | Refills: 0 | Status: DISCONTINUED | OUTPATIENT
Start: 2017-10-21 | End: 2017-10-26

## 2017-10-21 RX ORDER — DEXTROSE 50 % IN WATER 50 %
1 SYRINGE (ML) INTRAVENOUS ONCE
Qty: 0 | Refills: 0 | Status: DISCONTINUED | OUTPATIENT
Start: 2017-10-21 | End: 2017-10-26

## 2017-10-21 RX ORDER — PIPERACILLIN AND TAZOBACTAM 4; .5 G/20ML; G/20ML
3.38 INJECTION, POWDER, LYOPHILIZED, FOR SOLUTION INTRAVENOUS EVERY 8 HOURS
Qty: 0 | Refills: 0 | Status: DISCONTINUED | OUTPATIENT
Start: 2017-10-21 | End: 2017-10-22

## 2017-10-21 RX ORDER — LOSARTAN POTASSIUM 100 MG/1
100 TABLET, FILM COATED ORAL DAILY
Qty: 0 | Refills: 0 | Status: DISCONTINUED | OUTPATIENT
Start: 2017-10-21 | End: 2017-10-26

## 2017-10-21 RX ORDER — HYDROCHLOROTHIAZIDE 25 MG
25 TABLET ORAL DAILY
Qty: 0 | Refills: 0 | Status: DISCONTINUED | OUTPATIENT
Start: 2017-10-21 | End: 2017-10-26

## 2017-10-21 RX ADMIN — Medication 100 MILLIGRAM(S): at 03:57

## 2017-10-21 RX ADMIN — Medication 40 MILLIEQUIVALENT(S): at 18:19

## 2017-10-21 RX ADMIN — Medication 100 MILLIGRAM(S): at 05:58

## 2017-10-21 RX ADMIN — SODIUM CHLORIDE 3 MILLILITER(S): 9 INJECTION INTRAMUSCULAR; INTRAVENOUS; SUBCUTANEOUS at 03:56

## 2017-10-21 RX ADMIN — Medication 20 MILLIGRAM(S): at 05:58

## 2017-10-21 RX ADMIN — Medication 1: at 18:21

## 2017-10-21 RX ADMIN — ENOXAPARIN SODIUM 40 MILLIGRAM(S): 100 INJECTION SUBCUTANEOUS at 15:02

## 2017-10-21 RX ADMIN — MORPHINE SULFATE 2 MILLIGRAM(S): 50 CAPSULE, EXTENDED RELEASE ORAL at 06:06

## 2017-10-21 RX ADMIN — Medication 50 MILLIGRAM(S): at 18:20

## 2017-10-21 RX ADMIN — Medication 100 MILLIGRAM(S): at 18:19

## 2017-10-21 RX ADMIN — LATANOPROST 1 DROP(S): 0.05 SOLUTION/ DROPS OPHTHALMIC; TOPICAL at 21:17

## 2017-10-21 RX ADMIN — TAMSULOSIN HYDROCHLORIDE 0.4 MILLIGRAM(S): 0.4 CAPSULE ORAL at 21:18

## 2017-10-21 RX ADMIN — PIPERACILLIN AND TAZOBACTAM 25 GRAM(S): 4; .5 INJECTION, POWDER, LYOPHILIZED, FOR SOLUTION INTRAVENOUS at 21:17

## 2017-10-21 RX ADMIN — Medication 40 MILLIEQUIVALENT(S): at 14:02

## 2017-10-21 RX ADMIN — MORPHINE SULFATE 2 MILLIGRAM(S): 50 CAPSULE, EXTENDED RELEASE ORAL at 07:00

## 2017-10-21 RX ADMIN — SODIUM CHLORIDE 60 MILLILITER(S): 9 INJECTION INTRAMUSCULAR; INTRAVENOUS; SUBCUTANEOUS at 15:14

## 2017-10-21 RX ADMIN — Medication 166.67 MILLIGRAM(S): at 17:00

## 2017-10-21 RX ADMIN — Medication 40 MILLIEQUIVALENT(S): at 05:59

## 2017-10-21 RX ADMIN — Medication 650 MILLIGRAM(S): at 19:32

## 2017-10-21 RX ADMIN — Medication 650 MILLIGRAM(S): at 20:02

## 2017-10-21 RX ADMIN — PIPERACILLIN AND TAZOBACTAM 25 GRAM(S): 4; .5 INJECTION, POWDER, LYOPHILIZED, FOR SOLUTION INTRAVENOUS at 05:59

## 2017-10-21 RX ADMIN — SODIUM CHLORIDE 3000 MILLILITER(S): 9 INJECTION INTRAMUSCULAR; INTRAVENOUS; SUBCUTANEOUS at 05:08

## 2017-10-21 RX ADMIN — Medication 50 MILLIGRAM(S): at 05:58

## 2017-10-21 RX ADMIN — Medication 250 MILLIGRAM(S): at 03:56

## 2017-10-21 RX ADMIN — PIPERACILLIN AND TAZOBACTAM 25 GRAM(S): 4; .5 INJECTION, POWDER, LYOPHILIZED, FOR SOLUTION INTRAVENOUS at 14:02

## 2017-10-21 RX ADMIN — LOSARTAN POTASSIUM 100 MILLIGRAM(S): 100 TABLET, FILM COATED ORAL at 05:58

## 2017-10-21 RX ADMIN — Medication 25 MILLIGRAM(S): at 05:58

## 2017-10-21 NOTE — ED ADULT NURSE NOTE - OBJECTIVE STATEMENT
pt came with c/o b/l legs swelling and right lower leg ulcers , pt is aox3, denies any pain , ambulating to the bathroom w/o assisstance, gait steady, iv access, blood work done

## 2017-10-21 NOTE — H&P ADULT - PROBLEM SELECTOR PLAN 1
Patient with RLE cellulitis.  On exam:  b/l LE edema, chronic skin changes with white scaling on the left leg. right leg is red, warm, tender to touch, oozing foul smelling sero-purulent material at several places  IV Abx, Vanc/Zosyn, IVF, Cultures testing, repeat lactate,   will need ID eval  dopplers b/l LE to r/o DVT  wound care consult  social work eval as patient is homeless  tylenol PRN for fever, pain control  LAsix PO for chronic lymphedema  Vanc Trough: Sunday: 5AM

## 2017-10-21 NOTE — CONSULT NOTE ADULT - ASSESSMENT
A  66 yo male with  HTN,  b/l LE edema 2/2 to chronic lymphedema, and recently treated for cellulitis, now  presents to the ER, for evaluation of  increased swelling, redness and drainage from  right leg. He has no fever or chills. He follows up weekly in [facility] wound care. However, have missed last 2 weeks. In the ER, he has no fever, no leukocytosis and doppler negative for DVT. He has started on Zosyn and Vancomycin, blood cultures pending. The ID consult requested to assist with further antibiotic management of RLE cellulitis.       # RLE cellulitis  # Chronic B/L Lymphedema    Would recommend:    1. Follow up blood cultures  2. Keep LE elevated  3.         will follow the patient with you and make further recommendation based on the clinical course and Lab results  Thank you for the opportunity to participate in Mr. Jarrett's care A  66 yo male with  HTN,  b/l LE edema 2/2 to chronic lymphedema, and recently treated for cellulitis, now  presents to the ER, for evaluation of  increased swelling, redness and drainage from  right leg. He has no fever or chills. He follows up weekly in [facility] wound care. However, have missed last 2 weeks. In the ER, he has no fever, no leukocytosis and doppler negative for DVT. He has started on Zosyn and Vancomycin, blood cultures pending. The ID consult requested to assist with further antibiotic management of RLE cellulitis.       # RLE cellulitis  # Chronic B/L Lymphedema    Would recommend:    1. Follow up blood cultures  2. Keep LE elevated  3. Continue Zosyn and Vancomycin for now  4. Keep Vancomycin level between 15 to 20  5. Continue wound care    d/w Patient and Nursing staff      will follow the patient with you and make further recommendation based on the clinical course and Lab results  Thank you for the opportunity to participate in Mr. Jarrett's care

## 2017-10-21 NOTE — ED PROVIDER NOTE - MEDICAL DECISION MAKING DETAILS
Pt w/ lymphedema w/ increased discharge from RLE. Will obtain labs and give Ancef and Vancomycin for likely cellulitis. Will admit for further management.

## 2017-10-21 NOTE — H&P ADULT - HISTORY OF PRESENT ILLNESS
Patient is a 66 y/o w/ PMHx of HTN, cellulitis and b/l LE edema 2/2 to chronic lymphedema glaucoma presents to ED c/o increased swelling, redness and discharge from the right leg. Pt states that on a weekly basis, pt goes to [facility] wound care. However, last time pt went was 2 weeks ago. He missed an appt and the doctor was on vacation the following week. Now, pt has increased redness, swelling, and new drainage from legs. Otherwise, pt denies fever, nausea, vomiting, diarrhea, or any other complaints. ROS negative except above.  Patient was here last time in August for same complaint, was treated with vancomycin and discharged on doxy PO  Claims to be never smoker, drinks on some occasions FHx unknown Surgical hx of cholecystectomy and hernia repair. NKDA

## 2017-10-21 NOTE — H&P ADULT - NSHPPHYSICALEXAM_GEN_ALL_CORE
GENERAL: elderly male, obese, not in acute distress  HEAD: atraumatic, normocephalic   EYES: PERRLA, white sclera.   ENMT: nasal mucosa- Oral cavity- WNL  NECK: supple, no JVD, thyroid- not palpable  LYMPH: no palpable lymph nodes     SKIN:  warn and dry except b/l LE  CHEST/LUNG: No Chest deformity , no chest tenderness. bilateral breath sounds,no  adventitious sounds  HEART: RRR, no m/r/g   ABDOMEN: soft, nontender, nondistended; bowel sounds.+  EXTREMITIES: b/l LE edema, chronic skin changes with white scaling on the left leg. right leg is red, warm, tender to touch, oozing foul smelling sero-purulent material at several places  NEURO: [id number]

## 2017-10-21 NOTE — ED PROVIDER NOTE - OBJECTIVE STATEMENT
66 y/o w/ PMHx of HTN, cellulitis and b/l LE edema presents to ED c/o increased swelling, redness and discharge. Pt states that on a weekly basis, pt goes to [facility] wound care. However, last time pt went was 2 weeks ago. He missed an appt and the doctor was on vacation the following week. Now, pt has increased redness, swelling, and new drainage from legs. Otherwise, pt denies fever, nausea, vomiting, diarrhea, or any other complaints.

## 2017-10-21 NOTE — ED PROVIDER NOTE - PHYSICAL EXAMINATION
LE (+):  L leg - erythema and swelling, no active discharge and no ulcerations.  R leg - increased swelling, > L leg, redness, serous but yellow discharge, malodorous

## 2017-10-21 NOTE — ED PROVIDER NOTE - PROGRESS NOTE DETAILS
labs show wbc 9K, lactate 3.0-given only 1L NS in setting of severe lymphedema and will repeat  patient stable for admission

## 2017-10-21 NOTE — CONSULT NOTE ADULT - SUBJECTIVE AND OBJECTIVE BOX
A  68 yo male with  HTN,  b/l LE edema 2/2 to chronic lymphedema, and recently treated for cellulitis, now  presents to the ER, for evaluation of  increased swelling, redness and drainage from  right leg. He has no fever or chills. He follows up weekly in [facility] wound care. However, have missed last 2 weeks. In the ER, he has no fever, no leukocytosis and doppler negative for DVT. He has started on Zosyn and Vancomycin, blood cultures pending. The ID consult requested to assist with further antibiotic management of RLE cellulitis.             REVIEW OF SYSTEMS: Total of twelve systems have been reviewed with patient and found to be negative  unless mentioned in HPI          PAST MEDICAL & SURGICAL HISTORY:  Glaucoma  HTN (hypertension)  Cellulitis  Lymphedema  Chronic leg pain: Bilateral  H/O hernia repair  S/P cholecystectomy          SOCIAL HISTORY  Alcohol: Does not Drink  Tobacco: Does not Smoke  Illicit substance use: None        FAMILY HISTORY: Non contributory to the present illness        ALLERGIES: NKDA        T(C): 37.1 (10-21-17 @ 14:31), Max: 37.1 (10-21-17 @ 14:31)  HR: 87 (10-21-17 @ 14:31) (80 - 92)  BP: 112/52 (10-21-17 @ 14:31) (112/52 - 159/91)  RR: 17 (10-21-17 @ 14:31) (16 - 17)  SpO2: 94% (10-21-17 @ 14:31) (94% - 99%)  Wt(kg): --  I&O's Summary        PHYSICAL EXAM:  GENERAL: Not in distress  CVS: s1 and s2 present  RESP: Air entry B/L  GI: Abdomen soft and nontender  EXT:  CNS: Awake, alert and oriented X 3          LABS:                        14.0   10.0  )-----------( 312      ( 21 Oct 2017 13:51 )             44.9     10-21    138  |  103  |  12  ----------------------------<  161<H>  3.8   |  27  |  1.08    Ca    8.8      21 Oct 2017 13:51  Phos  2.6     10-21  Mg     1.8     10-21    TPro  7.5  /  Alb  3.1<L>  /  TBili  0.5  /  DBili  0.1  /  AST  17  /  ALT  20  /  AlkPhos  117  10-21        CAPILLARY BLOOD GLUCOSE  141 (21 Oct 2017 08:00)      POCT Blood Glucose.: 166 mg/dL (21 Oct 2017 15:51)  POCT Blood Glucose.: 140 mg/dL (21 Oct 2017 11:16)  POCT Blood Glucose.: 143 mg/dL (21 Oct 2017 09:44)            MEDICATIONS  (STANDING):  dextrose 5%. 1000 milliLiter(s) (50 mL/Hr) IV Continuous <Continuous>  dextrose 50% Injectable 12.5 Gram(s) IV Push once  dextrose 50% Injectable 25 Gram(s) IV Push once  dextrose 50% Injectable 25 Gram(s) IV Push once  enoxaparin Injectable 40 milliGRAM(s) SubCutaneous daily  furosemide    Tablet 20 milliGRAM(s) Oral daily  hydrALAZINE 100 milliGRAM(s) Oral two times a day  hydrochlorothiazide 25 milliGRAM(s) Oral daily  insulin lispro (HumaLOG) corrective regimen sliding scale   SubCutaneous three times a day before meals  latanoprost 0.005% Ophthalmic Solution 1 Drop(s) Both EYES at bedtime  losartan 100 milliGRAM(s) Oral daily  metoprolol 50 milliGRAM(s) Oral two times a day  piperacillin/tazobactam IVPB. 3.375 Gram(s) IV Intermittent every 8 hours  potassium chloride    Tablet ER 40 milliEquivalent(s) Oral every 4 hours  sodium chloride 0.9%. 1000 milliLiter(s) (60 mL/Hr) IV Continuous <Continuous>  tamsulosin 0.4 milliGRAM(s) Oral at bedtime  vancomycin  IVPB 1250 milliGRAM(s) IV Intermittent every 12 hours    MEDICATIONS  (PRN):  acetaminophen   Tablet 650 milliGRAM(s) Oral every 6 hours PRN For Temp greater than 38 C (100.4 F)  acetaminophen   Tablet. 650 milliGRAM(s) Oral every 6 hours PRN Mild Pain (1 - 3)  dextrose Gel 1 Dose(s) Oral once PRN Blood Glucose LESS THAN 70 milliGRAM(s)/deciLiter  glucagon  Injectable 1 milliGRAM(s) IntraMuscular once PRN Glucose <70 milliGRAM(s)/deciLiter  morphine  - Injectable 2 milliGRAM(s) IV Push every 6 hours PRN Severe Pain (7 - 10)  oxyCODONE    5 mG/acetaminophen 325 mG 1 Tablet(s) Oral every 6 hours PRN Moderate Pain (4 - 6)          RADIOLOGY & ADDITIONAL TESTS:    10/21/17 : US Duplex Venous Lower Ext Complete, Bilateral (10.21.17 @ 12:49) : Unremarkable ultrasound of the bilateral lower extremity deep venous system. A  68 yo male with  HTN,  b/l LE edema 2/2 to chronic lymphedema, and recently treated for cellulitis, now  presents to the ER, for evaluation of  increased swelling, redness and drainage from  right leg. He has no fever or chills. He follows up weekly in [facility] wound care. However, have missed last 2 weeks. In the ER, he has no fever, no leukocytosis and doppler negative for DVT. He has started on Zosyn and Vancomycin, blood cultures pending. The ID consult requested to assist with further antibiotic management of RLE cellulitis.             REVIEW OF SYSTEMS: Total of twelve systems have been reviewed with patient and found to be negative  unless mentioned in HPI          PAST MEDICAL & SURGICAL HISTORY:  Glaucoma  HTN (hypertension)  Cellulitis  Lymphedema  Chronic leg pain: Bilateral  H/O hernia repair  S/P cholecystectomy          SOCIAL HISTORY  Alcohol: Does not Drink  Tobacco: Does not Smoke  Illicit substance use: None        FAMILY HISTORY: Non contributory to the present illness        ALLERGIES: NKDA        T(C): 37.1 (10-21-17 @ 14:31), Max: 37.1 (10-21-17 @ 14:31)  HR: 87 (10-21-17 @ 14:31) (80 - 92)  BP: 112/52 (10-21-17 @ 14:31) (112/52 - 159/91)  RR: 17 (10-21-17 @ 14:31) (16 - 17)  SpO2: 94% (10-21-17 @ 14:31) (94% - 99%)  Wt(kg): --  I&O's Summary            PHYSICAL EXAM:  GENERAL: Not in distress  CVS: s1 and s2 present  RESP: Air entry B/L  GI: Abdomen soft and nontender  EXT: Right LE lymphedema, erythematous, warm to touch, swollen and tender with some ulceration, Left LE red, some open blister  CNS: Awake, alert and oriented X 3          LABS:                        14.0   10.0  )-----------( 312      ( 21 Oct 2017 13:51 )             44.9     10-21    138  |  103  |  12  ----------------------------<  161<H>  3.8   |  27  |  1.08    Ca    8.8      21 Oct 2017 13:51  Phos  2.6     10-21  Mg     1.8     10-21    TPro  7.5  /  Alb  3.1<L>  /  TBili  0.5  /  DBili  0.1  /  AST  17  /  ALT  20  /  AlkPhos  117  10-21        CAPILLARY BLOOD GLUCOSE  141 (21 Oct 2017 08:00)      POCT Blood Glucose.: 166 mg/dL (21 Oct 2017 15:51)  POCT Blood Glucose.: 140 mg/dL (21 Oct 2017 11:16)  POCT Blood Glucose.: 143 mg/dL (21 Oct 2017 09:44)            MEDICATIONS  (STANDING):  dextrose 5%. 1000 milliLiter(s) (50 mL/Hr) IV Continuous <Continuous>  dextrose 50% Injectable 12.5 Gram(s) IV Push once  dextrose 50% Injectable 25 Gram(s) IV Push once  dextrose 50% Injectable 25 Gram(s) IV Push once  enoxaparin Injectable 40 milliGRAM(s) SubCutaneous daily  furosemide    Tablet 20 milliGRAM(s) Oral daily  hydrALAZINE 100 milliGRAM(s) Oral two times a day  hydrochlorothiazide 25 milliGRAM(s) Oral daily  insulin lispro (HumaLOG) corrective regimen sliding scale   SubCutaneous three times a day before meals  latanoprost 0.005% Ophthalmic Solution 1 Drop(s) Both EYES at bedtime  losartan 100 milliGRAM(s) Oral daily  metoprolol 50 milliGRAM(s) Oral two times a day  piperacillin/tazobactam IVPB. 3.375 Gram(s) IV Intermittent every 8 hours  potassium chloride    Tablet ER 40 milliEquivalent(s) Oral every 4 hours  sodium chloride 0.9%. 1000 milliLiter(s) (60 mL/Hr) IV Continuous <Continuous>  tamsulosin 0.4 milliGRAM(s) Oral at bedtime  vancomycin  IVPB 1250 milliGRAM(s) IV Intermittent every 12 hours    MEDICATIONS  (PRN):  acetaminophen   Tablet 650 milliGRAM(s) Oral every 6 hours PRN For Temp greater than 38 C (100.4 F)  acetaminophen   Tablet. 650 milliGRAM(s) Oral every 6 hours PRN Mild Pain (1 - 3)  dextrose Gel 1 Dose(s) Oral once PRN Blood Glucose LESS THAN 70 milliGRAM(s)/deciLiter  glucagon  Injectable 1 milliGRAM(s) IntraMuscular once PRN Glucose <70 milliGRAM(s)/deciLiter  morphine  - Injectable 2 milliGRAM(s) IV Push every 6 hours PRN Severe Pain (7 - 10)  oxyCODONE    5 mG/acetaminophen 325 mG 1 Tablet(s) Oral every 6 hours PRN Moderate Pain (4 - 6)          RADIOLOGY & ADDITIONAL TESTS:    10/21/17 : US Duplex Venous Lower Ext Complete, Bilateral (10.21.17 @ 12:49) : Unremarkable ultrasound of the bilateral lower extremity deep venous system.

## 2017-10-21 NOTE — PATIENT PROFILE ADULT. - NS TRANSFER PATIENT BELONGINGS
Cell Phone/PDA (specify)/Clothing/t shirt, underwear 2 pair, 1 pair of shoes, , 1 pair shorts, 2 cell phones and

## 2017-10-21 NOTE — H&P ADULT - ASSESSMENT
Patient is a 66 y/o w/ PMHx of HTN, cellulitis and b/l LE edema 2/2 to chronic lymphedema glaucoma presents to ED c/o increased swelling, redness and discharge from the right leg.     Patient is afebrile in the ED, no leucocytosis, BMP showing hypokalemia of 3.1 ( likely 2/2 to HCTZ and lasix, being replaced), elevated BS: 181. has elevated lactate: 3.0  s/p ancef and vanc in the ED and 1 L bolus of NS, BCx sent by the ED  Admitted for RLE cellulitis

## 2017-10-22 DIAGNOSIS — N40.0 BENIGN PROSTATIC HYPERPLASIA WITHOUT LOWER URINARY TRACT SYMPTOMS: ICD-10-CM

## 2017-10-22 DIAGNOSIS — I89.0 LYMPHEDEMA, NOT ELSEWHERE CLASSIFIED: ICD-10-CM

## 2017-10-22 LAB
24R-OH-CALCIDIOL SERPL-MCNC: 46 NG/ML — SIGNIFICANT CHANGE UP (ref 30–80)
ANION GAP SERPL CALC-SCNC: 6 MMOL/L — SIGNIFICANT CHANGE UP (ref 5–17)
BUN SERPL-MCNC: 12 MG/DL — SIGNIFICANT CHANGE UP (ref 7–18)
CALCIUM SERPL-MCNC: 8.5 MG/DL — SIGNIFICANT CHANGE UP (ref 8.4–10.5)
CHLORIDE SERPL-SCNC: 104 MMOL/L — SIGNIFICANT CHANGE UP (ref 96–108)
CO2 SERPL-SCNC: 27 MMOL/L — SIGNIFICANT CHANGE UP (ref 22–31)
CREAT SERPL-MCNC: 0.99 MG/DL — SIGNIFICANT CHANGE UP (ref 0.5–1.3)
FOLATE SERPL-MCNC: 10.7 NG/ML — SIGNIFICANT CHANGE UP (ref 4.8–24.2)
GLUCOSE BLDC GLUCOMTR-MCNC: 133 MG/DL — HIGH (ref 70–99)
GLUCOSE BLDC GLUCOMTR-MCNC: 139 MG/DL — HIGH (ref 70–99)
GLUCOSE BLDC GLUCOMTR-MCNC: 141 MG/DL — HIGH (ref 70–99)
GLUCOSE BLDC GLUCOMTR-MCNC: 146 MG/DL — HIGH (ref 70–99)
GLUCOSE SERPL-MCNC: 127 MG/DL — HIGH (ref 70–99)
HBA1C BLD-MCNC: 6.7 % — HIGH (ref 4–5.6)
HCT VFR BLD CALC: 39.3 % — SIGNIFICANT CHANGE UP (ref 39–50)
HGB BLD-MCNC: 13 G/DL — SIGNIFICANT CHANGE UP (ref 13–17)
MAGNESIUM SERPL-MCNC: 1.9 MG/DL — SIGNIFICANT CHANGE UP (ref 1.6–2.6)
MCHC RBC-ENTMCNC: 30.6 PG — SIGNIFICANT CHANGE UP (ref 27–34)
MCHC RBC-ENTMCNC: 33 GM/DL — SIGNIFICANT CHANGE UP (ref 32–36)
MCV RBC AUTO: 92.8 FL — SIGNIFICANT CHANGE UP (ref 80–100)
PHOSPHATE SERPL-MCNC: 2.8 MG/DL — SIGNIFICANT CHANGE UP (ref 2.5–4.5)
PLATELET # BLD AUTO: 257 K/UL — SIGNIFICANT CHANGE UP (ref 150–400)
POTASSIUM SERPL-MCNC: 3.6 MMOL/L — SIGNIFICANT CHANGE UP (ref 3.5–5.3)
POTASSIUM SERPL-SCNC: 3.6 MMOL/L — SIGNIFICANT CHANGE UP (ref 3.5–5.3)
RBC # BLD: 4.24 M/UL — SIGNIFICANT CHANGE UP (ref 4.2–5.8)
RBC # FLD: 14.2 % — SIGNIFICANT CHANGE UP (ref 10.3–14.5)
SODIUM SERPL-SCNC: 137 MMOL/L — SIGNIFICANT CHANGE UP (ref 135–145)
VANCOMYCIN TROUGH SERPL-MCNC: 10.5 UG/ML — SIGNIFICANT CHANGE UP (ref 10–20)
VIT B12 SERPL-MCNC: 207 PG/ML — LOW (ref 243–894)
WBC # BLD: 8.1 K/UL — SIGNIFICANT CHANGE UP (ref 3.8–10.5)
WBC # FLD AUTO: 8.1 K/UL — SIGNIFICANT CHANGE UP (ref 3.8–10.5)

## 2017-10-22 RX ORDER — VANCOMYCIN HCL 1 G
1500 VIAL (EA) INTRAVENOUS EVERY 12 HOURS
Qty: 0 | Refills: 0 | Status: DISCONTINUED | OUTPATIENT
Start: 2017-10-22 | End: 2017-10-25

## 2017-10-22 RX ORDER — INFLUENZA VIRUS VACCINE 15; 15; 15; 15 UG/.5ML; UG/.5ML; UG/.5ML; UG/.5ML
0.5 SUSPENSION INTRAMUSCULAR ONCE
Qty: 0 | Refills: 0 | Status: COMPLETED | OUTPATIENT
Start: 2017-10-22 | End: 2017-10-22

## 2017-10-22 RX ORDER — PIPERACILLIN AND TAZOBACTAM 4; .5 G/20ML; G/20ML
3.38 INJECTION, POWDER, LYOPHILIZED, FOR SOLUTION INTRAVENOUS EVERY 8 HOURS
Qty: 0 | Refills: 0 | Status: DISCONTINUED | OUTPATIENT
Start: 2017-10-22 | End: 2017-10-26

## 2017-10-22 RX ADMIN — Medication 20 MILLIGRAM(S): at 05:04

## 2017-10-22 RX ADMIN — Medication 100 MILLIGRAM(S): at 17:03

## 2017-10-22 RX ADMIN — PIPERACILLIN AND TAZOBACTAM 25 GRAM(S): 4; .5 INJECTION, POWDER, LYOPHILIZED, FOR SOLUTION INTRAVENOUS at 06:03

## 2017-10-22 RX ADMIN — LOSARTAN POTASSIUM 100 MILLIGRAM(S): 100 TABLET, FILM COATED ORAL at 05:04

## 2017-10-22 RX ADMIN — Medication 100 MILLIGRAM(S): at 05:04

## 2017-10-22 RX ADMIN — Medication 300 MILLIGRAM(S): at 17:06

## 2017-10-22 RX ADMIN — Medication 650 MILLIGRAM(S): at 04:38

## 2017-10-22 RX ADMIN — Medication 25 MILLIGRAM(S): at 05:04

## 2017-10-22 RX ADMIN — LATANOPROST 1 DROP(S): 0.05 SOLUTION/ DROPS OPHTHALMIC; TOPICAL at 21:25

## 2017-10-22 RX ADMIN — Medication 650 MILLIGRAM(S): at 11:39

## 2017-10-22 RX ADMIN — Medication 50 MILLIGRAM(S): at 05:04

## 2017-10-22 RX ADMIN — PIPERACILLIN AND TAZOBACTAM 25 GRAM(S): 4; .5 INJECTION, POWDER, LYOPHILIZED, FOR SOLUTION INTRAVENOUS at 21:25

## 2017-10-22 RX ADMIN — Medication 650 MILLIGRAM(S): at 12:29

## 2017-10-22 RX ADMIN — Medication 50 MILLIGRAM(S): at 17:03

## 2017-10-22 RX ADMIN — TAMSULOSIN HYDROCHLORIDE 0.4 MILLIGRAM(S): 0.4 CAPSULE ORAL at 21:24

## 2017-10-22 RX ADMIN — PIPERACILLIN AND TAZOBACTAM 25 GRAM(S): 4; .5 INJECTION, POWDER, LYOPHILIZED, FOR SOLUTION INTRAVENOUS at 13:29

## 2017-10-22 RX ADMIN — ENOXAPARIN SODIUM 40 MILLIGRAM(S): 100 INJECTION SUBCUTANEOUS at 11:36

## 2017-10-22 RX ADMIN — Medication 650 MILLIGRAM(S): at 04:08

## 2017-10-22 RX ADMIN — Medication 166.67 MILLIGRAM(S): at 05:03

## 2017-10-22 NOTE — PROGRESS NOTE ADULT - SUBJECTIVE AND OBJECTIVE BOX
Patient is seen and examined at the bed side, is afebrile. The Redness of Right LE improved significantly, but still has the drainage.         REVIEW OF SYSTEMS: All other review systems are negative            Vital Signs Last 24 Hrs  T(C): 37.3 (22 Oct 2017 14:14), Max: 37.3 (22 Oct 2017 14:14)  T(F): 99.2 (22 Oct 2017 14:14), Max: 99.2 (22 Oct 2017 14:14)  HR: 74 (22 Oct 2017 14:14) (74 - 86)  BP: 123/66 (22 Oct 2017 14:14) (111/49 - 123/66)  BP(mean): --  RR: 16 (22 Oct 2017 14:14) (16 - 17)  SpO2: 99% (22 Oct 2017 14:14) (95% - 99%)          PHYSICAL EXAM:  GENERAL: Not in distress  CVS: s1 and s2 present  RESP: Air entry B/L  GI: Abdomen soft and nontender  EXT: Right LE lymphedema, the redness and swelling improving  CNS: Awake, alert and oriented X 3            ALLERGIES: NKDA            LABS:                          13.0   8.1   )-----------( 257      ( 22 Oct 2017 04:24 )             39.3                           14.0   10.0  )-----------( 312      ( 21 Oct 2017 13:51 )             44.9             10-22    137  |  104  |  12  ----------------------------<  127<H>  3.6   |  27  |  0.99    Ca    8.5      22 Oct 2017 04:24  Phos  2.8     10-22  Mg     1.9     10-22    TPro  7.5  /  Alb  3.1<L>  /  TBili  0.5  /  DBili  0.1  /  AST  17  /  ALT  20  /  AlkPhos  117  10-21        POCT Blood Glucose.: 166 mg/dL (21 Oct 2017 15:51)  POCT Blood Glucose.: 140 mg/dL (21 Oct 2017 11:16)  POCT Blood Glucose.: 143 mg/dL (21 Oct 2017 09:44)            MEDICATIONS  (STANDING):  dextrose 5%. 1000 milliLiter(s) (50 mL/Hr) IV Continuous <Continuous>  dextrose 50% Injectable 12.5 Gram(s) IV Push once  dextrose 50% Injectable 25 Gram(s) IV Push once  dextrose 50% Injectable 25 Gram(s) IV Push once  enoxaparin Injectable 40 milliGRAM(s) SubCutaneous daily  furosemide    Tablet 20 milliGRAM(s) Oral daily  hydrALAZINE 100 milliGRAM(s) Oral two times a day  hydrochlorothiazide 25 milliGRAM(s) Oral daily  influenza   Vaccine 0.5 milliLiter(s) IntraMuscular once  insulin lispro (HumaLOG) corrective regimen sliding scale   SubCutaneous three times a day before meals  latanoprost 0.005% Ophthalmic Solution 1 Drop(s) Both EYES at bedtime  losartan 100 milliGRAM(s) Oral daily  metoprolol 50 milliGRAM(s) Oral two times a day  piperacillin/tazobactam IVPB. 3.375 Gram(s) IV Intermittent every 8 hours  sodium chloride 0.9%. 1000 milliLiter(s) (60 mL/Hr) IV Continuous <Continuous>  tamsulosin 0.4 milliGRAM(s) Oral at bedtime  vancomycin  IVPB 1500 milliGRAM(s) IV Intermittent every 12 hours    MEDICATIONS  (PRN):  acetaminophen   Tablet 650 milliGRAM(s) Oral every 6 hours PRN For Temp greater than 38 C (100.4 F)  acetaminophen   Tablet. 650 milliGRAM(s) Oral every 6 hours PRN Mild Pain (1 - 3)  dextrose Gel 1 Dose(s) Oral once PRN Blood Glucose LESS THAN 70 milliGRAM(s)/deciLiter  glucagon  Injectable 1 milliGRAM(s) IntraMuscular once PRN Glucose <70 milliGRAM(s)/deciLiter  morphine  - Injectable 2 milliGRAM(s) IV Push every 6 hours PRN Severe Pain (7 - 10)  oxyCODONE    5 mG/acetaminophen 325 mG 1 Tablet(s) Oral every 6 hours PRN Moderate Pain (4 - 6)          RADIOLOGY & ADDITIONAL TESTS:    10/21/17 : US Duplex Venous Lower Ext Complete, Bilateral (10.21.17 @ 12:49) : Unremarkable ultrasound of the bilateral lower extremity deep venous system.            MICROBIOLOGY DATA:      Culture - Blood (10.21.17 @ 09:17)    Specimen Source: .Blood Blood-Peripheral    Culture Results:   No growth to date.    Culture - Blood (10.21.17 @ 09:17)    Specimen Source: .Blood Blood-Peripheral    Culture Results:   No growth to date.

## 2017-10-22 NOTE — PROGRESS NOTE ADULT - ASSESSMENT
A  66 yo male with  HTN,  b/l LE edema 2/2 to chronic lymphedema, and recently treated for cellulitis, now  presents to the ER, for evaluation of  increased swelling, redness and drainage from  right leg. He has no fever or chills. He follows up weekly in [facility] wound care. However, have missed last 2 weeks. In the ER, he has no fever, no leukocytosis and doppler negative for DVT. He has started on Zosyn and Vancomycin, blood cultures pending. The ID consult requested to assist with further antibiotic management of RLE cellulitis.       # RLE cellulitis  # Chronic B/L Lymphedema    Would recommend:    1. Continue Zosyn and Vancomycin since its improving  2. Keep LE elevated  3. Keep Vancomycin level between 15 to 20  4. Continue wound care    d/w Patient and Nursing staff      will follow the patient with you

## 2017-10-22 NOTE — PROGRESS NOTE ADULT - SUBJECTIVE AND OBJECTIVE BOX
ROGERS SILVA  MRN-[id number]    Patient is a 67y old  Male who presents with a chief complaint of redness, pain and swelling of right LE (21 Oc    patient seen and examined   s doing better , decreased redness and swelling of legs rt> lt    MEDICATIONS  (STANDING):  dextrose 5%. 1000 milliLiter(s) (50 mL/Hr) IV Continuous <Continuous>  dextrose 50% Injectable 12.5 Gram(s) IV Push once  dextrose 50% Injectable 25 Gram(s) IV Push once  dextrose 50% Injectable 25 Gram(s) IV Push once  enoxaparin Injectable 40 milliGRAM(s) SubCutaneous daily  furosemide    Tablet 20 milliGRAM(s) Oral daily  hydrALAZINE 100 milliGRAM(s) Oral two times a day  hydrochlorothiazide 25 milliGRAM(s) Oral daily  influenza   Vaccine 0.5 milliLiter(s) IntraMuscular once  insulin lispro (HumaLOG) corrective regimen sliding scale   SubCutaneous three times a day before meals  latanoprost 0.005% Ophthalmic Solution 1 Drop(s) Both EYES at bedtime  losartan 100 milliGRAM(s) Oral daily  metoprolol 50 milliGRAM(s) Oral two times a day  piperacillin/tazobactam IVPB. 3.375 Gram(s) IV Intermittent every 8 hours  sodium chloride 0.9%. 1000 milliLiter(s) (60 mL/Hr) IV Continuous <Continuous>  tamsulosin 0.4 milliGRAM(s) Oral at bedtime  vancomycin  IVPB 1500 milliGRAM(s) IV Intermittent every 12 hours      MEDICATIONS  (PRN):  acetaminophen   Tablet 650 milliGRAM(s) Oral every 6 hours PRN For Temp greater than 38 C (100.4 F)  acetaminophen   Tablet. 650 milliGRAM(s) Oral every 6 hours PRN Mild Pain (1 - 3)  dextrose Gel 1 Dose(s) Oral once PRN Blood Glucose LESS THAN 70 milliGRAM(s)/deciLiter  glucagon  Injectable 1 milliGRAM(s) IntraMuscular once PRN Glucose <70 milliGRAM(s)/deciLiter  morphine  - Injectable 2 milliGRAM(s) IV Push every 6 hours PRN Severe Pain (7 - 10)  oxyCODONE    5 mG/acetaminophen 325 mG 1 Tablet(s) Oral every 6 hours PRN Moderate Pain (4 - 6)      Allergies    IV Contrast (Hives)    Intolerances        PAST MEDICAL & SURGICAL HISTORY:  Glaucoma  HTN (hypertension)  Cellulitis  Lymphedema  Chronic leg pain: Bilateral  H/O hernia repair  S/P cholecystectomy      FAMILY HISTORY:      SOCIAL HISTORY  Smoking History:     REVIEW OF SYSTEMS:    CONSTITUTIONAL:  Fevers [  ]  Yes  [ x ]  No                                   chills [  ]  Yes  [ x ]  No                               sweats  [  ]  Yes  [x  ]  No                                fatigue [x  ]  Yes  [  ]  No    HEENT:  Eyes:  Diplopia or blurred vision [  ]  Yes  [x  ]  No    ENT:                        earache  [  ]  Yes  [  x]  No                             sore throat  [  ]  Yes  [ x ]  No                            runny nose.  [  ]  Yes  [  x]  No    CARDIOVASCULAR:       chest pain  [  ]  Yes  [x  ]  No                        squeezing, tightness,  [  ]  Yes  [x  ]  No                                      palpitations.  [  ]  Yes  [x  ]  No    RESPIRATORY:             Cough [  ]  Yes  [x  ]  No                                  Wheezing [  ]  Yes  [ x ]  No                                Hemoptysis [  ]  Yes  [  x]  No                                      Sputum [  ]  Yes  [x  ]  No                   Shortness of Breathe [  ]  Yes  [ x ]  No    GASTROINTESTINAL:      Abdominal pain  [  ]  Yes  [x  ]  No                                                    Nausea  [  ]  Yes  x[  ]  No                                                   Vomiting [  ]  Yes  x[  ]  No                                              Constipation [  ]  Yes  [ x ]  No                                                    Diarrhea [  ]  Yes  [x  ]  No    GENITOURINARY:           Incontinence [  ]  Yes  [x  ]  No                                                Dysuria [  ]  Yes  x[  ]  No                                      Blood in Urine  [  ]  Yes  [ x ]  No                          Frequency or urgency.  [  ]  Yes  [ x ]  No    NEUROLOGIC:                     Paresthesias  [  ]  Yes  [x  ]  No                                             fasciculations  [  ]  Yes  [x  ]  No                                seizures or weakness.  [  ]  Yes  [ x ]  No                                                   Headache [  ]  Yes  x[  ]  No                                  Loss of Conciousness [  ]  Yes  [ x ]  No                                                     Insomnia [  ]  Yes  [ x ]  No    PSYCHIATRIC:    Disorder of thought or mood.  [  ]  Yes  [x  ]  No                                                           Anxiety [  ]  Yes  [ x ]  No                                                      Depression [  ]  Yes  [ x ]  No                                                         Dementia [  ]  Yes  [x  ]  No    Vital Signs Last 24 Hrs  T(C): 37.3 (22 Oct 2017 14:14), Max: 37.3 (22 Oct 2017 14:14)  T(F): 99.2 (22 Oct 2017 14:14), Max: 99.2 (22 Oct 2017 14:14)  HR: 74 (22 Oct 2017 14:14) (74 - 86)  BP: 123/66 (22 Oct 2017 14:14) (111/49 - 123/66)  BP(mean): --  RR: 16 (22 Oct 2017 14:14) (16 - 17)  SpO2: 99% (22 Oct 2017 14:14) (95% - 99%)  I&O's Detail      PHYSICAL EXAMINATION:    GENERAL: The patient is a  obese well-developed, well-nourished _____in no apparent distress.     HEENT: Head is normocephalic and atraumatic. Extraocular muscles are intact. Mucous membranes are moist.     NECK: Supple. jvd [  ]  Yes  [  x]  No    LUNGS: Clear to auscultation  [  ]  Yes  [x  ]  No                               wheezing, [  ]  Yes  [ x ]  No                                      rales  [  ]  Yes  [ x ]  No                                    rhonchi  [  ]  Yes  [x  ]  No                 Respirations labored  [  ]  Yes  [ x ]  No    HEART: Regular rate and rhythm  [ x ]  Yes  [  ]  No                                        Murmur [  ]  Yes  [ x ]  No                                              rub  [  ]  Yes  [x  ]  No                                          gallop  [  ]  Yes  [x  ]  No    ABDOMEN:           obese                      Soft, [ x ]  Yes  [  ]  No                                             nontender [ x ]  Yes  [  ]  No                                             distended.[  ]  Yes  [x  ]  No                            hepatosplenomegaly ,[  ]  Yes  [ x ]  No                                                    BS   [x  ]  Yes  [  ]  No    EXTREMITIES:                cyanosis, [  ]  Yes  [ x ]  No                                       clubbing,   [  ]  Yes  x ]  No                                               rash, [x  ]  Yes  [  ]  No                                           edema.  [x  ]  Yes  [  ]  No  lymph edema ,with redness some  pus point rt>lt  NEUROLOGIC: Alert and Oriented  [ x ] Person [ x ] Time  x[ ] Place                                    Cranial nerves intact    [x  ]  Yes  [  ]  No                                               sensory Intact  [ x ]  Yes  [  ]  No                                                  motor WNL   [x  ]  Yes  [  ]  No                                                Wil Paresis  [  ]  Yes  [ x ]  No  DTR  babinski+ or -      LABS:                        13.0   8.1   )-----------( 257      ( 22 Oct 2017 04:24 )             39.3     10-22    137  |  104  |  12  ----------------------------<  127<H>  3.6   |  27  |  0.99    Ca    8.5      22 Oct 2017 04:24  Phos  2.8     10-22  Mg     1.9     10-22    TPro  7.5  /  Alb  3.1<L>  /  TBili  0.5  /  DBili  0.1  /  AST  17  /  ALT  20  /  AlkPhos  117  10-21                        MICROBIOLOGY:  Culture - Blood (10.21.17 @ 09:17)    Specimen Source: .Blood Blood-Peripheral    Culture Results:   No growth to date.      RADIOLOGY & ADDITIONAL STUDIES:< from: US Duplex Venous Lower Ext Complete, Bilateral (10.21.17 @ 12:49) >  IMPRESSION:Unremarkable ultrasound of the bilateral lower extremity   deep venous system.          < end of copied text >      CXR:    Ct scan chest:    ekg;    echo:

## 2017-10-23 LAB
GLUCOSE BLDC GLUCOMTR-MCNC: 135 MG/DL — HIGH (ref 70–99)
GLUCOSE BLDC GLUCOMTR-MCNC: 150 MG/DL — HIGH (ref 70–99)
GLUCOSE BLDC GLUCOMTR-MCNC: 161 MG/DL — HIGH (ref 70–99)
GLUCOSE BLDC GLUCOMTR-MCNC: 167 MG/DL — HIGH (ref 70–99)
VANCOMYCIN TROUGH SERPL-MCNC: 18.7 UG/ML — SIGNIFICANT CHANGE UP (ref 10–20)

## 2017-10-23 RX ADMIN — Medication 650 MILLIGRAM(S): at 16:08

## 2017-10-23 RX ADMIN — LATANOPROST 1 DROP(S): 0.05 SOLUTION/ DROPS OPHTHALMIC; TOPICAL at 22:08

## 2017-10-23 RX ADMIN — Medication 50 MILLIGRAM(S): at 05:01

## 2017-10-23 RX ADMIN — Medication 1: at 12:30

## 2017-10-23 RX ADMIN — Medication 25 MILLIGRAM(S): at 05:01

## 2017-10-23 RX ADMIN — PIPERACILLIN AND TAZOBACTAM 25 GRAM(S): 4; .5 INJECTION, POWDER, LYOPHILIZED, FOR SOLUTION INTRAVENOUS at 13:44

## 2017-10-23 RX ADMIN — Medication 300 MILLIGRAM(S): at 05:00

## 2017-10-23 RX ADMIN — TAMSULOSIN HYDROCHLORIDE 0.4 MILLIGRAM(S): 0.4 CAPSULE ORAL at 22:08

## 2017-10-23 RX ADMIN — LOSARTAN POTASSIUM 100 MILLIGRAM(S): 100 TABLET, FILM COATED ORAL at 05:01

## 2017-10-23 RX ADMIN — PIPERACILLIN AND TAZOBACTAM 25 GRAM(S): 4; .5 INJECTION, POWDER, LYOPHILIZED, FOR SOLUTION INTRAVENOUS at 06:40

## 2017-10-23 RX ADMIN — Medication 100 MILLIGRAM(S): at 05:01

## 2017-10-23 RX ADMIN — Medication 650 MILLIGRAM(S): at 18:09

## 2017-10-23 RX ADMIN — Medication 650 MILLIGRAM(S): at 07:27

## 2017-10-23 RX ADMIN — ENOXAPARIN SODIUM 40 MILLIGRAM(S): 100 INJECTION SUBCUTANEOUS at 12:16

## 2017-10-23 RX ADMIN — Medication 20 MILLIGRAM(S): at 05:01

## 2017-10-23 RX ADMIN — Medication 300 MILLIGRAM(S): at 18:29

## 2017-10-23 RX ADMIN — Medication 50 MILLIGRAM(S): at 18:31

## 2017-10-23 RX ADMIN — PIPERACILLIN AND TAZOBACTAM 25 GRAM(S): 4; .5 INJECTION, POWDER, LYOPHILIZED, FOR SOLUTION INTRAVENOUS at 22:07

## 2017-10-23 RX ADMIN — Medication 650 MILLIGRAM(S): at 08:00

## 2017-10-23 NOTE — CONSULT NOTE ADULT - SUBJECTIVE AND OBJECTIVE BOX
S : 67y year old Male seen at bedside in NAD and AAOx3 for Right leg ulceration and swelling.      HPI :  Patient is a 66 y/o w/ PMHx of HTN, cellulitis and b/l LE edema 2/2 to chronic lymphedema glaucoma presents to ED c/o increased swelling, redness and discharge from the right leg. Pt states that on a weekly basis, pt goes to [facility] wound care. However, last time pt went was 2 weeks ago. He missed an appt and the doctor was on vacation the following week. Now, pt has increased redness, swelling, and new drainage from legs. Otherwise, pt denies fever, nausea, vomiting, diarrhea, or any other complaints. ROS negative except above.  Patient was here last time in August for same complaint, was treated with vancomycin and discharged on doxy PO  Claims to be never smoker, drinks on some occasions FHx unknown Surgical hx of cholecystectomy and hernia repair. NKDA (21 Oct 2017 05:11)      Patient admits to  (-) Fevers, (-) Chills, (-) Nausea, (-) Vomiting, (-) Shortness of Breath      PMH: Glaucoma  HTN (hypertension)  Cellulitis  Lymphedema  Chronic leg pain    PSH:H/O hernia repair  S/P cholecystectomy  No significant past surgical history      Allergies:IV Contrast (Hives)      Labs:                          13.0   8.1   )-----------( 257      ( 22 Oct 2017 04:24 )             39.3     WBC Trend  8.1 Date (10-22 @ 04:24)  10.0 Date (10-21 @ 13:51)  9.2 Date (10-21 @ 03:52)      Chem  10-22    137  |  104  |  12  ----------------------------<  127<H>  3.6   |  27  |  0.99    Ca    8.5      22 Oct 2017 04:24  Phos  2.8     10-22  Mg     1.9     10-22            T(F): 98.6 (10-23-17 @ 14:09), Max: 98.8 (10-22-17 @ 21:00)  HR: 80 (10-23-17 @ 14:09) (79 - 82)  BP: 109/65 (10-23-17 @ 14:09) (109/63 - 137/71)  RR: 17 (10-23-17 @ 14:09) (16 - 17)  SpO2: 95% (10-23-17 @ 14:09) (95% - 96%)  Wt(kg): --    O:   General: Pleasant  male NAD & AOX3.    Integument:  Skin warm, dry and supple bilateral.    Ulceration Right leg , - hyperkeratotic border, wound base Fibrogranular, wound size (2.1 cm X 1.6 cm X 0.1 cm) + edema, - liv-wound erythema, - purulence, - fluctuance, - tracking/tunneling, - probe to bone.   Vascular: Dorsalis Pedis and Posterior Tibial pulses non-palpable.  Capillary re-fill time less then 3 seconds digits 1-5 bilateral.    Neuro: Protective sensation intact to the level of the digits bilateral.  MSK: Muscle strength 5/5 all major muscle groups bilateral.  Deformity:  A: Right leg ulceration and swelling      P:   Chart reviewed and Patient evaluated  Discussed diagnosis and treatment with patient  Wound flush with normal saline  Obtained wound culture to be sent to Pathology  Applied ACE compression with with dry sterile dressing  Continue with IV antibiotics As Per ID  Weight bearing as tolerated  Offloading to bilateral Heels.   Dr. Monet JENKINS, will follow while in house.  Discussed with Dr. Healy

## 2017-10-23 NOTE — CONSULT NOTE ADULT - ASSESSMENT
Patient is a 68 y/o w/ PMHx of HTN, cellulitis and b/l LE edema 2/2 to chronic lymphedema glaucoma presents to ED c/o increased swelling, redness and discharge from the right leg.     Patient is afebrile in the ED, no leucocytosis, BMP showing hypokalemia of 3.1 ( likely 2/2 to HCTZ and lasix, being replaced), elevated BS: 181. has elevated lactate: 3.0  s/p ancef and vanc in the ED and 1 L bolus of NS, BCx sent by the ED  Admitted for RLE cellulitis

## 2017-10-23 NOTE — CONSULT NOTE ADULT - ATTENDING COMMENTS
67y year old Male seen at bedside in NAD and AAOx3 for Right leg ulceration and swelling with cellulitis  Admitted for RLE cellulitis, pt on IV antibiotics  PMH: Glaucoma  HTN (hypertension)  Cellulitis  Lymphedema  Chronic leg pain    PSH:H/O hernia repair  S/P cholecystectomy    Vital Signs Last 24 Hrs  T(C): 37 (23 Oct 2017 14:09), Max: 37.1 (22 Oct 2017 21:00)  T(F): 98.6 (23 Oct 2017 14:09), Max: 98.8 (22 Oct 2017 21:00)  HR: 80 (23 Oct 2017 14:09) (79 - 82)  BP: 109/65 (23 Oct 2017 14:09) (109/63 - 137/71)  BP(mean): --  RR: 17 (23 Oct 2017 14:09) (16 - 17)  SpO2: 95% (23 Oct 2017 14:09) (95% - 96%)    O:   General: Pleasant  male NAD & AOX3.    Integument:  Skin warm, dry and supple bilateral.    Ulceration Right leg , - hyperkeratotic border, wound base Fibrogranular, wound size (2.1 cm X 1.6 cm X 0.1 cm) + edema, - liv-wound erythema, - purulence, - fluctuance, - tracking/tunneling, - probe to bone.   Vascular: Dorsalis Pedis and Posterior Tibial pulses non-palpable.  Capillary re-fill time less then 3 seconds digits 1-5 bilateral.    Neuro: Protective sensation intact to the level of the digits bilateral.  MSK: Muscle strength 5/5 all major muscle groups bilateral.  Deformity:  A: Right leg ulceration and swelling      P:   Chart reviewed and Patient evaluated  Discussed diagnosis and treatment with patient  Performed excisional debridement of devitalized tissues with scalpel to and including subcutaneous level   Wound flush with normal saline  Obtained wound culture to be sent to Pathology  Applied ACE compression with with dry sterile dressing  Ordered santyl ointment for daily dressing changes  Continue with IV antibiotics As Per ID  Weight bearing as tolerated  Offloading to bilateral Heels and elevate bilateral LE

## 2017-10-23 NOTE — PROGRESS NOTE ADULT - SUBJECTIVE AND OBJECTIVE BOX
Patient is a 67y old  Male who presents with a chief complaint of redness, pain and swelling of right LE (21 Oct 2017 05:11)    INTERVAL HPI / OVERNIGHT EVENTS:  pt reported burning sensation in the skin of RLE    T(C): 37.1 (10-23-17 @ 04:54), Max: 37.3 (10-22-17 @ 14:14)  HR: 82 (10-23-17 @ 04:54) (74 - 82)  BP: 137/71 (10-23-17 @ 04:54) (109/63 - 137/71)  RR: 16 (10-23-17 @ 04:54) (16 - 16)  SpO2: 95% (10-23-17 @ 04:54) (95% - 99%)  I&O's Summary    LABS:                        13.0   8.1   )-----------( 257      ( 22 Oct 2017 04:24 )             39.3     10-22    137  |  104  |  12  ----------------------------<  127<H>  3.6   |  27  |  0.99    Ca    8.5      22 Oct 2017 04:24  Phos  2.8     10-22  Mg     1.9     10-22    TPro  7.5  /  Alb  3.1<L>  /  TBili  0.5  /  DBili  0.1  /  AST  17  /  ALT  20  /  AlkPhos  117  10-21    CAPILLARY BLOOD GLUCOSE    POCT Blood Glucose.: 150 mg/dL (23 Oct 2017 07:54)  POCT Blood Glucose.: 141 mg/dL (22 Oct 2017 20:29)  POCT Blood Glucose.: 139 mg/dL (22 Oct 2017 16:17)  POCT Blood Glucose.: 133 mg/dL (22 Oct 2017 11:19)    REVIEW OF SYSTEMS:  complains of RLE swelling, skin burning and pain controlled with Tylenol     RADIOLOGY & ADDITIONAL TESTS:    bilateral LE doppler negative     Imaging Personally Reviewed:  [x] YES  [ ] NO    Consultant(s) Notes Reviewed:  [x] YES  [ ] NO    PHYSICAL EXAM:  GENERAL: NAD, well-groomed, well-developed, obese   HEAD:  Atraumatic, Normocephalic  EYES: EOMI, PERRLA, conjunctiva and sclera clear  ENMT: No tonsillar erythema, exudates, or enlargement; Moist mucous membranes, Good dentition, No lesions  NECK: Supple, No JVD, Normal thyroid  NERVOUS SYSTEM:  Alert & Oriented X3, Good concentration; Motor Strength 5/5 B/L upper and lower extremities; DTRs 2+ intact and symmetric  CHEST/LUNG: Clear to percussion bilaterally; No rales, rhonchi, wheezing, or rubs  HEART: Regular rate and rhythm; No murmurs, rubs, or gallops  ABDOMEN: Soft, Nontender, Nondistended; Bowel sounds present  EXTREMITIES:  2+ Peripheral Pulses, No clubbing, cyanosis, or edema  SKIN: b/l LE edema, chronic skin changes with white scaling on the left leg. Right leg is red, warm, tender to touch, oozing foul smelling sero-purulent material at several places    Care Discussed with Consultants/Other Providers [x] YES  [ ] NO Patient is a 67y old  Male who presents with a chief complaint of redness, pain and swelling of right LE (21 Oct 2017 05:11)    INTERVAL HPI / OVERNIGHT EVENTS:  pt reported burning sensation in the skin of RLE    T(C): 37.1 (10-23-17 @ 04:54), Max: 37.3 (10-22-17 @ 14:14)  HR: 82 (10-23-17 @ 04:54) (74 - 82)  BP: 137/71 (10-23-17 @ 04:54) (109/63 - 137/71)  RR: 16 (10-23-17 @ 04:54) (16 - 16)  SpO2: 95% (10-23-17 @ 04:54) (95% - 99%)  I&O's Summary  LABS:                                         LABS:                        13.0   8.1   )-----------( 257      ( 22 Oct 2017 04:24 )             39.3     10-22    137  |  104  |  12  ----------------------------<  127<H>  3.6   |  27  |  0.99    Ca    8.5      22 Oct 2017 04:24  Phos  2.8     10-22  Mg     1.9     10-22    TPro  7.5  /  Alb  3.1<L>  /  TBili  0.5  /  DBili  0.1  /  AST  17  /  ALT  20  /  AlkPhos  117  10-21    CAPILLARY BLOOD GLUCOSE    POCT Blood Glucose.: 150 mg/dL (23 Oct 2017 07:54)  POCT Blood Glucose.: 141 mg/dL (22 Oct 2017 20:29)  POCT Blood Glucose.: 139 mg/dL (22 Oct 2017 16:17)  POCT Blood Glucose.: 133 mg/dL (22 Oct 2017 11:19)    REVIEW OF SYSTEMS:  complains of RLE swelling, skin burning and pain controlled with Tylenol     RADIOLOGY & ADDITIONAL TESTS:    bilateral LE doppler negative     Imaging Personally Reviewed:  [x] YES  [ ] NO    Consultant(s) Notes Reviewed:  [x] YES  [ ] NO    PHYSICAL EXAM:  GENERAL: NAD, well-groomed, well-developed, obese   HEAD:  Atraumatic, Normocephalic  EYES: EOMI, PERRLA, conjunctiva and sclera clear  ENMT: No tonsillar erythema, exudates, or enlargement; Moist mucous membranes, Good dentition, No lesions  NECK: Supple, No JVD, Normal thyroid  NERVOUS SYSTEM:  Alert & Oriented X3, Good concentration; Motor Strength 5/5 B/L upper and lower extremities; DTRs 2+ intact and symmetric  CHEST/LUNG: Clear to percussion bilaterally; No rales, rhonchi, wheezing, or rubs  HEART: Regular rate and rhythm; No murmurs, rubs, or gallops  ABDOMEN: Soft, Nontender, Nondistended; Bowel sounds present  EXTREMITIES:  2+ Peripheral Pulses, No clubbing, cyanosis, or edema  SKIN: b/l LE edema, chronic skin changes with white scaling on the left leg. Right leg is red, warm, tender to touch, oozing foul smelling sero-purulent material at several places    Care Discussed with Consultants/Other Providers [x] YES  [ ] NO

## 2017-10-23 NOTE — PROGRESS NOTE ADULT - SUBJECTIVE AND OBJECTIVE BOX
Patient is seen and examined at the bed side, is afebrile. The swelling, drainage and Redness of Right LE continues to improve. He is feeling better.        REVIEW OF SYSTEMS: All other review systems are negative            Vital Signs Last 24 Hrs  T(C): 37 (23 Oct 2017 14:09), Max: 37.1 (22 Oct 2017 21:00)  T(F): 98.6 (23 Oct 2017 14:09), Max: 98.8 (22 Oct 2017 21:00)  HR: 80 (23 Oct 2017 14:09) (79 - 82)  BP: 109/65 (23 Oct 2017 14:09) (109/63 - 137/71)  BP(mean): --  RR: 17 (23 Oct 2017 14:09) (16 - 17)  SpO2: 95% (23 Oct 2017 14:09) (95% - 96%)        PHYSICAL EXAM:  GENERAL: Not in distress  CVS: s1 and s2 present  RESP: Air entry B/L  GI: Abdomen soft and nontender  EXT: Right LE lymphedema, the redness and swelling improving  CNS: Awake, alert and oriented X 3            ALLERGIES: NKDA            LABS: No new labs                       13.0   8.1   )-----------( 257      ( 22 Oct 2017 04:24 )             39.3                           14.0   10.0  )-----------( 312      ( 21 Oct 2017 13:51 )             44.9             10-22    137  |  104  |  12  ----------------------------<  127<H>  3.6   |  27  |  0.99    Ca    8.5      22 Oct 2017 04:24  Phos  2.8     10-22  Mg     1.9     10-22    TPro  7.5  /  Alb  3.1<L>  /  TBili  0.5  /  DBili  0.1  /  AST  17  /  ALT  20  /  AlkPhos  117  10-21        POCT Blood Glucose.: 166 mg/dL (21 Oct 2017 15:51)  POCT Blood Glucose.: 140 mg/dL (21 Oct 2017 11:16)  POCT Blood Glucose.: 143 mg/dL (21 Oct 2017 09:44)          MEDICATIONS  (STANDING):  dextrose 5%. 1000 milliLiter(s) (50 mL/Hr) IV Continuous <Continuous>  dextrose 50% Injectable 12.5 Gram(s) IV Push once  dextrose 50% Injectable 25 Gram(s) IV Push once  dextrose 50% Injectable 25 Gram(s) IV Push once  enoxaparin Injectable 40 milliGRAM(s) SubCutaneous daily  furosemide    Tablet 20 milliGRAM(s) Oral daily  hydrochlorothiazide 25 milliGRAM(s) Oral daily  influenza   Vaccine 0.5 milliLiter(s) IntraMuscular once  insulin lispro (HumaLOG) corrective regimen sliding scale   SubCutaneous three times a day before meals  latanoprost 0.005% Ophthalmic Solution 1 Drop(s) Both EYES at bedtime  losartan 100 milliGRAM(s) Oral daily  metoprolol 50 milliGRAM(s) Oral two times a day  piperacillin/tazobactam IVPB. 3.375 Gram(s) IV Intermittent every 8 hours  sodium chloride 0.9%. 1000 milliLiter(s) (60 mL/Hr) IV Continuous <Continuous>  tamsulosin 0.4 milliGRAM(s) Oral at bedtime  vancomycin  IVPB 1500 milliGRAM(s) IV Intermittent every 12 hours    MEDICATIONS  (PRN):  acetaminophen   Tablet 650 milliGRAM(s) Oral every 6 hours PRN For Temp greater than 38 C (100.4 F)  acetaminophen   Tablet. 650 milliGRAM(s) Oral every 6 hours PRN Mild Pain (1 - 3)  dextrose Gel 1 Dose(s) Oral once PRN Blood Glucose LESS THAN 70 milliGRAM(s)/deciLiter  glucagon  Injectable 1 milliGRAM(s) IntraMuscular once PRN Glucose <70 milliGRAM(s)/deciLiter  morphine  - Injectable 2 milliGRAM(s) IV Push every 6 hours PRN Severe Pain (7 - 10)  oxyCODONE    5 mG/acetaminophen 325 mG 1 Tablet(s) Oral every 6 hours PRN Moderate Pain (4 - 6)          RADIOLOGY & ADDITIONAL TESTS:    10/21/17 : US Duplex Venous Lower Ext Complete, Bilateral (10.21.17 @ 12:49) : Unremarkable ultrasound of the bilateral lower extremity deep venous system.            MICROBIOLOGY DATA:      Culture - Blood (10.21.17 @ 09:17)    Specimen Source: .Blood Blood-Peripheral    Culture Results:   No growth to date.    Culture - Blood (10.21.17 @ 09:17)    Specimen Source: .Blood Blood-Peripheral    Culture Results:   No growth to date.

## 2017-10-23 NOTE — PROGRESS NOTE ADULT - ASSESSMENT
A  66 yo male with  HTN,  b/l LE edema 2/2 to chronic lymphedema, and recently treated for cellulitis, now  presents to the ER, for evaluation of  increased swelling, redness and drainage from  right leg. He has no fever or chills. He follows up weekly in [facility] wound care. However, have missed last 2 weeks. In the ER, he has no fever, no leukocytosis and doppler negative for DVT. He has started on Zosyn and Vancomycin, blood cultures pending. The ID consult requested to assist with further antibiotic management of RLE cellulitis.       # RLE cellulitis  # Chronic B/L Lymphedema    Would recommend:    1. Increase Vancomycin dose to 1500 mg Q 12hours since level is low, 10.9  2. Continue Zosyn   3. Keep LE elevated  4. Keep Vancomycin level between 15 to 20  5. Continue wound care as per Podiatry    d/w Patient and Nursing staff      will follow the patient with you

## 2017-10-24 LAB
ALBUMIN SERPL ELPH-MCNC: 2.5 G/DL — LOW (ref 3.5–5)
ALP SERPL-CCNC: 78 U/L — SIGNIFICANT CHANGE UP (ref 40–120)
ALT FLD-CCNC: 14 U/L DA — SIGNIFICANT CHANGE UP (ref 10–60)
ANION GAP SERPL CALC-SCNC: 8 MMOL/L — SIGNIFICANT CHANGE UP (ref 5–17)
AST SERPL-CCNC: 14 U/L — SIGNIFICANT CHANGE UP (ref 10–40)
BASOPHILS # BLD AUTO: 0.1 K/UL — SIGNIFICANT CHANGE UP (ref 0–0.2)
BASOPHILS NFR BLD AUTO: 1.1 % — SIGNIFICANT CHANGE UP (ref 0–2)
BILIRUB SERPL-MCNC: 0.3 MG/DL — SIGNIFICANT CHANGE UP (ref 0.2–1.2)
BUN SERPL-MCNC: 9 MG/DL — SIGNIFICANT CHANGE UP (ref 7–18)
CALCIUM SERPL-MCNC: 9 MG/DL — SIGNIFICANT CHANGE UP (ref 8.4–10.5)
CHLORIDE SERPL-SCNC: 103 MMOL/L — SIGNIFICANT CHANGE UP (ref 96–108)
CO2 SERPL-SCNC: 28 MMOL/L — SIGNIFICANT CHANGE UP (ref 22–31)
CREAT SERPL-MCNC: 0.94 MG/DL — SIGNIFICANT CHANGE UP (ref 0.5–1.3)
EOSINOPHIL # BLD AUTO: 0.2 K/UL — SIGNIFICANT CHANGE UP (ref 0–0.5)
EOSINOPHIL NFR BLD AUTO: 2.7 % — SIGNIFICANT CHANGE UP (ref 0–6)
GLUCOSE BLDC GLUCOMTR-MCNC: 124 MG/DL — HIGH (ref 70–99)
GLUCOSE BLDC GLUCOMTR-MCNC: 130 MG/DL — HIGH (ref 70–99)
GLUCOSE BLDC GLUCOMTR-MCNC: 146 MG/DL — HIGH (ref 70–99)
GLUCOSE BLDC GLUCOMTR-MCNC: 147 MG/DL — HIGH (ref 70–99)
GLUCOSE SERPL-MCNC: 118 MG/DL — HIGH (ref 70–99)
HCT VFR BLD CALC: 38 % — LOW (ref 39–50)
HGB BLD-MCNC: 12.6 G/DL — LOW (ref 13–17)
LYMPHOCYTES # BLD AUTO: 1.7 K/UL — SIGNIFICANT CHANGE UP (ref 1–3.3)
LYMPHOCYTES # BLD AUTO: 22.6 % — SIGNIFICANT CHANGE UP (ref 13–44)
MAGNESIUM SERPL-MCNC: 2 MG/DL — SIGNIFICANT CHANGE UP (ref 1.6–2.6)
MCHC RBC-ENTMCNC: 30.8 PG — SIGNIFICANT CHANGE UP (ref 27–34)
MCHC RBC-ENTMCNC: 33 GM/DL — SIGNIFICANT CHANGE UP (ref 32–36)
MCV RBC AUTO: 93.3 FL — SIGNIFICANT CHANGE UP (ref 80–100)
MONOCYTES # BLD AUTO: 0.7 K/UL — SIGNIFICANT CHANGE UP (ref 0–0.9)
MONOCYTES NFR BLD AUTO: 9.1 % — SIGNIFICANT CHANGE UP (ref 2–14)
NEUTROPHILS # BLD AUTO: 4.8 K/UL — SIGNIFICANT CHANGE UP (ref 1.8–7.4)
NEUTROPHILS NFR BLD AUTO: 64.5 % — SIGNIFICANT CHANGE UP (ref 43–77)
PHOSPHATE SERPL-MCNC: 3.2 MG/DL — SIGNIFICANT CHANGE UP (ref 2.5–4.5)
PLATELET # BLD AUTO: 258 K/UL — SIGNIFICANT CHANGE UP (ref 150–400)
POTASSIUM SERPL-MCNC: 3.6 MMOL/L — SIGNIFICANT CHANGE UP (ref 3.5–5.3)
POTASSIUM SERPL-SCNC: 3.6 MMOL/L — SIGNIFICANT CHANGE UP (ref 3.5–5.3)
PROT SERPL-MCNC: 6.6 G/DL — SIGNIFICANT CHANGE UP (ref 6–8.3)
RBC # BLD: 4.08 M/UL — LOW (ref 4.2–5.8)
RBC # FLD: 14 % — SIGNIFICANT CHANGE UP (ref 10.3–14.5)
SODIUM SERPL-SCNC: 139 MMOL/L — SIGNIFICANT CHANGE UP (ref 135–145)
VANCOMYCIN TROUGH SERPL-MCNC: 19.7 UG/ML — SIGNIFICANT CHANGE UP (ref 10–20)
WBC # BLD: 7.4 K/UL — SIGNIFICANT CHANGE UP (ref 3.8–10.5)
WBC # FLD AUTO: 7.4 K/UL — SIGNIFICANT CHANGE UP (ref 3.8–10.5)

## 2017-10-24 RX ADMIN — PIPERACILLIN AND TAZOBACTAM 25 GRAM(S): 4; .5 INJECTION, POWDER, LYOPHILIZED, FOR SOLUTION INTRAVENOUS at 14:00

## 2017-10-24 RX ADMIN — Medication 50 MILLIGRAM(S): at 18:00

## 2017-10-24 RX ADMIN — Medication 300 MILLIGRAM(S): at 17:58

## 2017-10-24 RX ADMIN — PIPERACILLIN AND TAZOBACTAM 25 GRAM(S): 4; .5 INJECTION, POWDER, LYOPHILIZED, FOR SOLUTION INTRAVENOUS at 22:03

## 2017-10-24 RX ADMIN — TAMSULOSIN HYDROCHLORIDE 0.4 MILLIGRAM(S): 0.4 CAPSULE ORAL at 22:04

## 2017-10-24 RX ADMIN — ENOXAPARIN SODIUM 40 MILLIGRAM(S): 100 INJECTION SUBCUTANEOUS at 14:00

## 2017-10-24 RX ADMIN — Medication 50 MILLIGRAM(S): at 06:25

## 2017-10-24 RX ADMIN — LATANOPROST 1 DROP(S): 0.05 SOLUTION/ DROPS OPHTHALMIC; TOPICAL at 22:04

## 2017-10-24 RX ADMIN — LOSARTAN POTASSIUM 100 MILLIGRAM(S): 100 TABLET, FILM COATED ORAL at 06:25

## 2017-10-24 RX ADMIN — PIPERACILLIN AND TAZOBACTAM 25 GRAM(S): 4; .5 INJECTION, POWDER, LYOPHILIZED, FOR SOLUTION INTRAVENOUS at 06:25

## 2017-10-24 RX ADMIN — Medication 20 MILLIGRAM(S): at 06:25

## 2017-10-24 RX ADMIN — Medication 25 MILLIGRAM(S): at 06:25

## 2017-10-24 RX ADMIN — Medication 300 MILLIGRAM(S): at 06:25

## 2017-10-24 NOTE — PROGRESS NOTE ADULT - SUBJECTIVE AND OBJECTIVE BOX
ROGERS SILVA  MRN-[id number]    Patient is a 67y old  Male who presents with a chief complaint of redness, pain and swelling of right LE (21 Oc    patient seen and examined   s doing better , decreased redness and swelling of legs       .MEDICATIONS  (STANDING):  dextrose 5%. 1000 milliLiter(s) (50 mL/Hr) IV Continuous <Continuous>  dextrose 50% Injectable 12.5 Gram(s) IV Push once  dextrose 50% Injectable 25 Gram(s) IV Push once  dextrose 50% Injectable 25 Gram(s) IV Push once  enoxaparin Injectable 40 milliGRAM(s) SubCutaneous daily  furosemide    Tablet 20 milliGRAM(s) Oral daily  hydrochlorothiazide 25 milliGRAM(s) Oral daily  influenza   Vaccine 0.5 milliLiter(s) IntraMuscular once  insulin lispro (HumaLOG) corrective regimen sliding scale   SubCutaneous three times a day before meals  latanoprost 0.005% Ophthalmic Solution 1 Drop(s) Both EYES at bedtime  losartan 100 milliGRAM(s) Oral daily  metoprolol 50 milliGRAM(s) Oral two times a day  piperacillin/tazobactam IVPB. 3.375 Gram(s) IV Intermittent every 8 hours  sodium chloride 0.9%. 1000 milliLiter(s) (60 mL/Hr) IV Continuous <Continuous>  tamsulosin 0.4 milliGRAM(s) Oral at bedtime  vancomycin  IVPB 1500 milliGRAM(s) IV Intermittent every 12 hours    MEDICATIONS  (PRN):  acetaminophen   Tablet 650 milliGRAM(s) Oral every 6 hours PRN For Temp greater than 38 C (100.4 F)  acetaminophen   Tablet. 650 milliGRAM(s) Oral every 6 hours PRN Mild Pain (1 - 3)  dextrose Gel 1 Dose(s) Oral once PRN Blood Glucose LESS THAN 70 milliGRAM(s)/deciLiter  glucagon  Injectable 1 milliGRAM(s) IntraMuscular once PRN Glucose <70 milliGRAM(s)/deciLiter  morphine  - Injectable 2 milliGRAM(s) IV Push every 6 hours PRN Severe Pain (7 - 10)  oxyCODONE    5 mG/acetaminophen 325 mG 1 Tablet(s) Oral every 6 hours PRN Moderate Pain (4 - 6)      Allergies    IV Contrast (Hives)    Intolerances        PAST MEDICAL & SURGICAL HISTORY:  Glaucoma  HTN (hypertension)  Cellulitis  Lymphedema  Chronic leg pain: Bilateral  H/O hernia repair  S/P cholecystectomy      FAMILY HISTORY:      SOCIAL HISTORY  Smoking History:     REVIEW OF SYSTEMS:    CONSTITUTIONAL:  Fevers [  ]  Yes  [ x ]  No                                   chills [  ]  Yes  [ x ]  No                               sweats  [  ]  Yes  [x  ]  No                                fatigue [x  ]  Yes  [  ]  No    HEENT:  Eyes:  Diplopia or blurred vision [  ]  Yes  [x  ]  No    ENT:                        earache  [  ]  Yes  [  x]  No                             sore throat  [  ]  Yes  [ x ]  No                            runny nose.  [  ]  Yes  [  x]  No    CARDIOVASCULAR:       chest pain  [  ]  Yes  [x  ]  No                        squeezing, tightness,  [  ]  Yes  [x  ]  No                                      palpitations.  [  ]  Yes  [x  ]  No    RESPIRATORY:             Cough [  ]  Yes  [x  ]  No                                  Wheezing [  ]  Yes  [ x ]  No                                Hemoptysis [  ]  Yes  [  x]  No                                      Sputum [  ]  Yes  [x  ]  No                   Shortness of Breathe [  ]  Yes  [ x ]  No    GASTROINTESTINAL:      Abdominal pain  [  ]  Yes  [x  ]  No                                                    Nausea  [  ]  Yes  x[  ]  No                                                   Vomiting [  ]  Yes  x[  ]  No                                              Constipation [  ]  Yes  [ x ]  No                                                    Diarrhea [  ]  Yes  [x  ]  No    GENITOURINARY:           Incontinence [  ]  Yes  [x  ]  No                                                Dysuria [  ]  Yes  x[  ]  No                                      Blood in Urine  [  ]  Yes  [ x ]  No                          Frequency or urgency.  [  ]  Yes  [ x ]  No    NEUROLOGIC:                     Paresthesias  [  ]  Yes  [x  ]  No                                             fasciculations  [  ]  Yes  [x  ]  No                                seizures or weakness.  [  ]  Yes  [ x ]  No                                                   Headache [  ]  Yes  x[  ]  No                                  Loss of Conciousness [  ]  Yes  [ x ]  No                                                     Insomnia [  ]  Yes  [ x ]  No    PSYCHIATRIC:    Disorder of thought or mood.  [  ]  Yes  [x  ]  No                                                           Anxiety [  ]  Yes  [ x ]  No                                                      Depression [  ]  Yes  [ x ]  No                                                         Dementia [  ]  Yes  [x  ]  No  Vital Signs Last 24 Hrs  T(C): 36.6 (24 Oct 2017 05:05), Max: 37.1 (23 Oct 2017 22:10)  T(F): 97.9 (24 Oct 2017 05:05), Max: 98.8 (23 Oct 2017 22:10)  HR: 70 (24 Oct 2017 05:05) (70 - 90)  BP: 136/72 (24 Oct 2017 05:05) (109/65 - 139/55)  BP(mean): --  RR: 17 (24 Oct 2017 05:05) (17 - 17)  SpO2: 98% (24 Oct 2017 05:05) (95% - 98%)    PHYSICAL EXAMINATION:    GENERAL: The patient is a  obese well-developed, well-nourished _____in no apparent distress.     HEENT: Head is normocephalic and atraumatic. Extraocular muscles are intact. Mucous membranes are moist.     NECK: Supple. jvd [  ]  Yes  [  x]  No    LUNGS: Clear to auscultation  [  ]  Yes  [x  ]  No                               wheezing, [  ]  Yes  [ x ]  No                                      rales  [  ]  Yes  [ x ]  No                                    rhonchi  [  ]  Yes  [x  ]  No                 Respirations labored  [  ]  Yes  [ x ]  No    HEART: Regular rate and rhythm  [ x ]  Yes  [  ]  No                                        Murmur [  ]  Yes  [ x ]  No                                              rub  [  ]  Yes  [x  ]  No                                          gallop  [  ]  Yes  [x  ]  No    ABDOMEN:           obese                      Soft, [ x ]  Yes  [  ]  No                                             nontender [ x ]  Yes  [  ]  No                                             distended.[  ]  Yes  [x  ]  No                            hepatosplenomegaly ,[  ]  Yes  [ x ]  No                                                    BS   [x  ]  Yes  [  ]  No    EXTREMITIES:                cyanosis, [  ]  Yes  [ x ]  No                                       clubbing,   [  ]  Yes  x ]  No                                               rash, [x  ]  Yes  [  ]  No                                           edema.  [x  ]  Yes  [  ]  No  lymph edema ,with redness some  pus point rt>lt  NEUROLOGIC: Alert and Oriented  [ x ] Person [ x ] Time  x[ ] Place                                    Cranial nerves intact    [x  ]  Yes  [  ]  No                                               sensory Intact  [ x ]  Yes  [  ]  No                                                  motor WNL   [x  ]  Yes  [  ]  No                                                Wil Paresis  [  ]  Yes  [ x ]  No  DTR  babinski+ or -  LABS:                        12.6   7.4   )-----------( 258      ( 24 Oct 2017 07:06 )             38.0     10-24    139  |  103  |  9   ----------------------------<  118<H>  3.6   |  28  |  0.94    Ca    9.0      24 Oct 2017 07:06  Phos  3.2     10-24  Mg     2.0     10-24    TPro  6.6  /  Alb  2.5<L>  /  TBili  0.3  /  DBili  x   /  AST  14  /  ALT  14  /  AlkPhos  78  10-24                            LABS:                        13.0   8.1   )-----------( 257      ( 22 Oct 2017 04:24 )             39.3     10-22    137  |  104  |  12  ----------------------------<  127<H>  3.6   |  27  |  0.99    Ca    8.5      22 Oct 2017 04:24  Phos  2.8     10-22  Mg     1.9     10-22    TPro  7.5  /  Alb  3.1<L>  /  TBili  0.5  /  DBili  0.1  /  AST  17  /  ALT  20  /  AlkPhos  117  10-21                        MICROBIOLOGY:  Culture - Blood (10.21.17 @ 09:17)    Specimen Source: .Blood Blood-Peripheral    Culture Results:   No growth to date.      RADIOLOGY & ADDITIONAL STUDIES:< from: US Duplex Venous Lower Ext Complete, Bilateral (10.21.17 @ 12:49) >  IMPRESSION:Unremarkable ultrasound of the bilateral lower extremity   deep venous system.          < end of copied text >      CXR:    Ct scan chest:    ekg;    echo:

## 2017-10-24 NOTE — PROGRESS NOTE ADULT - SUBJECTIVE AND OBJECTIVE BOX
Patient is a 67y old  Male who presents with a chief complaint of redness, pain and swelling of right LE (21 Oct 2017 05:11)    INTERVAL HPI / OVERNIGHT EVENTS:  no acute events overnight     T(C): 36.6 (10-24-17 @ 05:05), Max: 37.1 (10-23-17 @ 22:10)  HR: 70 (10-24-17 @ 05:05) (70 - 90)  BP: 136/72 (10-24-17 @ 05:05) (109/65 - 139/55)  RR: 17 (10-24-17 @ 05:05) (17 - 17)  SpO2: 98% (10-24-17 @ 05:05) (95% - 98%)  I&O's Summary    LABS:                        12.6   7.4   )-----------( 258      ( 24 Oct 2017 07:06 )             38.0     10-24    139  |  103  |  9   ----------------------------<  118<H>  3.6   |  28  |  0.94    Ca    9.0      24 Oct 2017 07:06  Phos  3.2     10-24  Mg     2.0     10-24    TPro  6.6  /  Alb  2.5<L>  /  TBili  0.3  /  DBili  x   /  AST  14  /  ALT  14  /  AlkPhos  78  10-24    CAPILLARY BLOOD GLUCOSE    POCT Blood Glucose.: 147 mg/dL (24 Oct 2017 08:06)  POCT Blood Glucose.: 161 mg/dL (23 Oct 2017 21:36)  POCT Blood Glucose.: 135 mg/dL (23 Oct 2017 16:02)  POCT Blood Glucose.: 167 mg/dL (23 Oct 2017 11:26)    REVIEW OF SYSTEMS:  no active complains     RADIOLOGY & ADDITIONAL TESTS:    Imaging Personally Reviewed:  [x] YES  [ ] NO    Consultant(s) Notes Reviewed:  [x] YES  [ ] NO    PHYSICAL EXAM:  GENERAL: NAD, well-groomed, well-developed, obese   HEAD:  Atraumatic, Normocephalic  EYES: EOMI, PERRLA, conjunctiva and sclera clear  ENMT: No tonsillar erythema, exudates, or enlargement; Moist mucous membranes, Good dentition, No lesions  NECK: Supple, No JVD, Normal thyroid  NERVOUS SYSTEM:  Alert & Oriented X3, Good concentration; Motor Strength 5/5 B/L upper and lower extremities; DTRs 2+ intact and symmetric  CHEST/LUNG: Clear to percussion bilaterally; No rales, rhonchi, wheezing, or rubs  HEART: Regular rate and rhythm; No murmurs, rubs, or gallops  ABDOMEN: Soft, Nontender, Nondistended; Bowel sounds present  EXTREMITIES:  2+ Peripheral Pulses, LE edema R > L  SKIN: b/l LE edema, chronic skin changes with white scaling on the left leg. Right leg is red, warm, tender to touch, oozing foul smelling sero-purulent material at several places    Care Discussed with Consultants/Other Providers [x] YES  [ ] NO

## 2017-10-24 NOTE — PROGRESS NOTE ADULT - SUBJECTIVE AND OBJECTIVE BOX
67y year old Male seen at bedside in NAD and AAOx3 for Right leg ulceration and swelling with cellulitis  Admitted for RLE cellulitis, pt on IV antibiotics vancomycin and zosyn. Local wound care with silvadene    Vital Signs Last 24 Hrs  T(C): 36.6 (10-24-17 @ 05:05), Max: 37.1 (10-23-17 @ 22:10)  HR: 70 (10-24-17 @ 05:05) (70 - 90)  BP: 136/72 (10-24-17 @ 05:05) (109/65 - 139/55)  RR: 17 (10-24-17 @ 05:05) (17 - 17)  SpO2: 98% (10-24-17 @ 05:05) (95% - 98%)    O:   General: Pleasant  male NAD & AOX3.    Integument:  Skin warm, dry and supple bilateral.    Ulceration Right leg posterior aspect, - hyperkeratotic border, wound base granular, wound size (5.1 cm X 3.6 cm X 0.1 cm) + edema, + liv-wound erythema, - purulence, - fluctuance, - tracking/tunneling, - probe to bone.   Left anterior leg with granular base measuring 2.2 cm x 1.8 cm with some areas of blister + edema, + liv-wound erythema, - purulence, - fluctuance, - tracking/tunneling, - probe to bone.   Vascular: Dorsalis Pedis and Posterior Tibial pulses non-palpable.  Capillary re-fill time less then 3 seconds digits 1-5 bilateral.    Neuro: Protective sensation intact to the level of the digits bilateral.  MSK: Muscle strength 5/5 all major muscle groups bilateral.

## 2017-10-24 NOTE — PROGRESS NOTE ADULT - SUBJECTIVE AND OBJECTIVE BOX
Patient is seen and examined at the bed side, is afebrile. The swelling, drainage and Redness of Right LE  improved significantly.  The Podiatry follow up noted, s/p superficial debridement and culture is pending.            REVIEW OF SYSTEMS: All other review systems are negative            Vital Signs Last 24 Hrs  T(C): 36.9 (24 Oct 2017 14:23), Max: 37.1 (23 Oct 2017 22:10)  T(F): 98.4 (24 Oct 2017 14:23), Max: 98.8 (23 Oct 2017 22:10)  HR: 74 (24 Oct 2017 14:23) (70 - 90)  BP: 124/67 (24 Oct 2017 14:23) (124/67 - 139/55)  BP(mean): --  RR: 16 (24 Oct 2017 14:23) (16 - 17)  SpO2: 94% (24 Oct 2017 14:23) (94% - 98%)          PHYSICAL EXAM:  GENERAL: Not in distress  CVS: s1 and s2 present  RESP: Air entry B/L  GI: Abdomen soft and nontender  EXT: Right LE lymphedema, the redness and swelling improving  CNS: Awake, alert and oriented X 3            ALLERGIES: NKDA            LABS:                                12.6   7.4   )-----------( 258      ( 24 Oct 2017 07:06 )             38.0                   13.0   8.1   )-----------( 257      ( 22 Oct 2017 04:24 )             39.3           10-24    139  |  103  |  9   ----------------------------<  118<H>  3.6   |  28  |  0.94    Ca    9.0      24 Oct 2017 07:06  Phos  3.2     10-24  Mg     2.0     10-24    TPro  6.6  /  Alb  2.5<L>  /  TBili  0.3  /  DBili  x   /  AST  14  /  ALT  14  /  AlkPhos  78  10-24            POCT Blood Glucose.: 166 mg/dL (21 Oct 2017 15:51)  POCT Blood Glucose.: 140 mg/dL (21 Oct 2017 11:16)  POCT Blood Glucose.: 143 mg/dL (21 Oct 2017 09:44)              MEDICATIONS  (STANDING):  dextrose 5%. 1000 milliLiter(s) (50 mL/Hr) IV Continuous <Continuous>  dextrose 50% Injectable 12.5 Gram(s) IV Push once  dextrose 50% Injectable 25 Gram(s) IV Push once  dextrose 50% Injectable 25 Gram(s) IV Push once  enoxaparin Injectable 40 milliGRAM(s) SubCutaneous daily  furosemide    Tablet 20 milliGRAM(s) Oral daily  hydrochlorothiazide 25 milliGRAM(s) Oral daily  influenza   Vaccine 0.5 milliLiter(s) IntraMuscular once  insulin lispro (HumaLOG) corrective regimen sliding scale   SubCutaneous three times a day before meals  latanoprost 0.005% Ophthalmic Solution 1 Drop(s) Both EYES at bedtime  losartan 100 milliGRAM(s) Oral daily  metoprolol 50 milliGRAM(s) Oral two times a day  piperacillin/tazobactam IVPB. 3.375 Gram(s) IV Intermittent every 8 hours  sodium chloride 0.9%. 1000 milliLiter(s) (60 mL/Hr) IV Continuous <Continuous>  tamsulosin 0.4 milliGRAM(s) Oral at bedtime  vancomycin  IVPB 1500 milliGRAM(s) IV Intermittent every 12 hours          RADIOLOGY & ADDITIONAL TESTS:    10/21/17 : US Duplex Venous Lower Ext Complete, Bilateral (10.21.17 @ 12:49) : Unremarkable ultrasound of the bilateral lower extremity deep venous system.            MICROBIOLOGY DATA:      Culture - Blood (10.21.17 @ 09:17)    Specimen Source: .Blood Blood-Peripheral    Culture Results:   No growth to date.    Culture - Blood (10.21.17 @ 09:17)    Specimen Source: .Blood Blood-Peripheral    Culture Results:   No growth to date.

## 2017-10-24 NOTE — PROGRESS NOTE ADULT - ASSESSMENT
A  66 yo male with  HTN,  b/l LE edema 2/2 to chronic lymphedema, and recently treated for cellulitis, now  presents to the ER, for evaluation of  increased swelling, redness and drainage from  right leg. He has no fever or chills. He follows up weekly in [facility] wound care. However, have missed last 2 weeks. In the ER, he has no fever, no leukocytosis and doppler negative for DVT. He has started on Zosyn and Vancomycin, blood cultures pending. The ID consult requested to assist with further antibiotic management of RLE cellulitis.       # RLE cellulitis  # Chronic B/L Lymphedema    Would recommend:  1. Follow up swab culture  2. Continue adjusted doses of  Vancomycin and Zosyn  3. Keep LE elevated  4. Keep Vancomycin level between 15 to 20  5. Continue wound care as per Podiatry    d/w Patient and Nursing staff      will follow the patient with you

## 2017-10-24 NOTE — PROGRESS NOTE ADULT - ASSESSMENT
A: Bilateral LE venous stasis ulcers with cellulitis   PVD/VI/ Lymphedema  Onychomycosis    P:   Chart reviewed and Patient evaluated  Discussed diagnosis and treatment with patient  s/p excisional debridement of devitalized tissues with scalpel to and including subcutaneous level   Wound flush with normal saline  Applied silvadene cream with adaptic and compression dressing  Continue with IV antibiotics As Per ID vanco/zosyn IV  Perform aseptic debridement of toenails without complications  Weight bearing as tolerated  Offloading to bilateral Heels and elevate bilateral LE .  Follow up wound cultures

## 2017-10-25 ENCOUNTER — TRANSCRIPTION ENCOUNTER (OUTPATIENT)
Age: 67
End: 2017-10-25

## 2017-10-25 LAB
ALBUMIN SERPL ELPH-MCNC: 2.5 G/DL — LOW (ref 3.5–5)
ALP SERPL-CCNC: 87 U/L — SIGNIFICANT CHANGE UP (ref 40–120)
ALT FLD-CCNC: 15 U/L DA — SIGNIFICANT CHANGE UP (ref 10–60)
ANION GAP SERPL CALC-SCNC: 7 MMOL/L — SIGNIFICANT CHANGE UP (ref 5–17)
AST SERPL-CCNC: 13 U/L — SIGNIFICANT CHANGE UP (ref 10–40)
BASOPHILS # BLD AUTO: 0.1 K/UL — SIGNIFICANT CHANGE UP (ref 0–0.2)
BASOPHILS NFR BLD AUTO: 1.1 % — SIGNIFICANT CHANGE UP (ref 0–2)
BILIRUB SERPL-MCNC: 0.3 MG/DL — SIGNIFICANT CHANGE UP (ref 0.2–1.2)
BUN SERPL-MCNC: 10 MG/DL — SIGNIFICANT CHANGE UP (ref 7–18)
CALCIUM SERPL-MCNC: 8.9 MG/DL — SIGNIFICANT CHANGE UP (ref 8.4–10.5)
CHLORIDE SERPL-SCNC: 102 MMOL/L — SIGNIFICANT CHANGE UP (ref 96–108)
CO2 SERPL-SCNC: 27 MMOL/L — SIGNIFICANT CHANGE UP (ref 22–31)
CREAT SERPL-MCNC: 0.98 MG/DL — SIGNIFICANT CHANGE UP (ref 0.5–1.3)
EOSINOPHIL # BLD AUTO: 0.3 K/UL — SIGNIFICANT CHANGE UP (ref 0–0.5)
EOSINOPHIL NFR BLD AUTO: 3.4 % — SIGNIFICANT CHANGE UP (ref 0–6)
GLUCOSE BLDC GLUCOMTR-MCNC: 102 MG/DL — HIGH (ref 70–99)
GLUCOSE BLDC GLUCOMTR-MCNC: 123 MG/DL — HIGH (ref 70–99)
GLUCOSE BLDC GLUCOMTR-MCNC: 132 MG/DL — HIGH (ref 70–99)
GLUCOSE BLDC GLUCOMTR-MCNC: 150 MG/DL — HIGH (ref 70–99)
GLUCOSE SERPL-MCNC: 121 MG/DL — HIGH (ref 70–99)
HCT VFR BLD CALC: 41 % — SIGNIFICANT CHANGE UP (ref 39–50)
HGB BLD-MCNC: 13.1 G/DL — SIGNIFICANT CHANGE UP (ref 13–17)
LYMPHOCYTES # BLD AUTO: 1.6 K/UL — SIGNIFICANT CHANGE UP (ref 1–3.3)
LYMPHOCYTES # BLD AUTO: 20.6 % — SIGNIFICANT CHANGE UP (ref 13–44)
MAGNESIUM SERPL-MCNC: 2.1 MG/DL — SIGNIFICANT CHANGE UP (ref 1.6–2.6)
MCHC RBC-ENTMCNC: 29.1 PG — SIGNIFICANT CHANGE UP (ref 27–34)
MCHC RBC-ENTMCNC: 32 GM/DL — SIGNIFICANT CHANGE UP (ref 32–36)
MCV RBC AUTO: 91 FL — SIGNIFICANT CHANGE UP (ref 80–100)
MONOCYTES # BLD AUTO: 0.7 K/UL — SIGNIFICANT CHANGE UP (ref 0–0.9)
MONOCYTES NFR BLD AUTO: 8.4 % — SIGNIFICANT CHANGE UP (ref 2–14)
NEUTROPHILS # BLD AUTO: 5.3 K/UL — SIGNIFICANT CHANGE UP (ref 1.8–7.4)
NEUTROPHILS NFR BLD AUTO: 66.5 % — SIGNIFICANT CHANGE UP (ref 43–77)
PHOSPHATE SERPL-MCNC: 3.3 MG/DL — SIGNIFICANT CHANGE UP (ref 2.5–4.5)
PLATELET # BLD AUTO: 291 K/UL — SIGNIFICANT CHANGE UP (ref 150–400)
POTASSIUM SERPL-MCNC: 3.6 MMOL/L — SIGNIFICANT CHANGE UP (ref 3.5–5.3)
POTASSIUM SERPL-SCNC: 3.6 MMOL/L — SIGNIFICANT CHANGE UP (ref 3.5–5.3)
PROT SERPL-MCNC: 7 G/DL — SIGNIFICANT CHANGE UP (ref 6–8.3)
RBC # BLD: 4.5 M/UL — SIGNIFICANT CHANGE UP (ref 4.2–5.8)
RBC # FLD: 13.6 % — SIGNIFICANT CHANGE UP (ref 10.3–14.5)
SODIUM SERPL-SCNC: 136 MMOL/L — SIGNIFICANT CHANGE UP (ref 135–145)
VANCOMYCIN TROUGH SERPL-MCNC: 21.4 UG/ML — HIGH (ref 10–20)
WBC # BLD: 8 K/UL — SIGNIFICANT CHANGE UP (ref 3.8–10.5)
WBC # FLD AUTO: 8 K/UL — SIGNIFICANT CHANGE UP (ref 3.8–10.5)

## 2017-10-25 RX ORDER — DIPHENHYDRAMINE HCL 50 MG
50 CAPSULE ORAL EVERY 8 HOURS
Qty: 0 | Refills: 0 | Status: DISCONTINUED | OUTPATIENT
Start: 2017-10-25 | End: 2017-10-26

## 2017-10-25 RX ORDER — ACETAMINOPHEN 500 MG
2 TABLET ORAL
Qty: 0 | Refills: 0 | COMMUNITY
Start: 2017-10-25

## 2017-10-25 RX ORDER — AZTREONAM 2 G
1 VIAL (EA) INJECTION
Qty: 28 | Refills: 0 | OUTPATIENT
Start: 2017-10-25 | End: 2017-11-08

## 2017-10-25 RX ORDER — VANCOMYCIN HCL 1 G
1500 VIAL (EA) INTRAVENOUS ONCE
Qty: 0 | Refills: 0 | Status: COMPLETED | OUTPATIENT
Start: 2017-10-25 | End: 2017-10-25

## 2017-10-25 RX ADMIN — Medication 50 MILLIGRAM(S): at 17:31

## 2017-10-25 RX ADMIN — Medication 25 MILLIGRAM(S): at 05:10

## 2017-10-25 RX ADMIN — Medication 300 MILLIGRAM(S): at 17:31

## 2017-10-25 RX ADMIN — Medication 650 MILLIGRAM(S): at 09:15

## 2017-10-25 RX ADMIN — Medication 20 MILLIGRAM(S): at 05:10

## 2017-10-25 RX ADMIN — ENOXAPARIN SODIUM 40 MILLIGRAM(S): 100 INJECTION SUBCUTANEOUS at 11:54

## 2017-10-25 RX ADMIN — Medication 50 MILLIGRAM(S): at 11:55

## 2017-10-25 RX ADMIN — PIPERACILLIN AND TAZOBACTAM 25 GRAM(S): 4; .5 INJECTION, POWDER, LYOPHILIZED, FOR SOLUTION INTRAVENOUS at 05:10

## 2017-10-25 RX ADMIN — Medication 50 MILLIGRAM(S): at 05:10

## 2017-10-25 RX ADMIN — LOSARTAN POTASSIUM 100 MILLIGRAM(S): 100 TABLET, FILM COATED ORAL at 05:10

## 2017-10-25 RX ADMIN — TAMSULOSIN HYDROCHLORIDE 0.4 MILLIGRAM(S): 0.4 CAPSULE ORAL at 21:43

## 2017-10-25 RX ADMIN — PIPERACILLIN AND TAZOBACTAM 25 GRAM(S): 4; .5 INJECTION, POWDER, LYOPHILIZED, FOR SOLUTION INTRAVENOUS at 13:39

## 2017-10-25 RX ADMIN — PIPERACILLIN AND TAZOBACTAM 25 GRAM(S): 4; .5 INJECTION, POWDER, LYOPHILIZED, FOR SOLUTION INTRAVENOUS at 21:43

## 2017-10-25 RX ADMIN — Medication 650 MILLIGRAM(S): at 10:15

## 2017-10-25 NOTE — PROGRESS NOTE ADULT - SUBJECTIVE AND OBJECTIVE BOX
Patient is seen and examined at the bed side, is afebrile. The swelling, drainage and Redness of Right LE continues to  improve.   The superficial swab culture being  re- incubated due to insufficient growth.               REVIEW OF SYSTEMS: All other review systems are negative            Vital Signs Last 24 Hrs  T(C): 36.9 (25 Oct 2017 14:16), Max: 37.2 (24 Oct 2017 20:31)  T(F): 98.5 (25 Oct 2017 14:16), Max: 98.9 (24 Oct 2017 20:31)  HR: 66 (25 Oct 2017 14:16) (66 - 72)  BP: 106/65 (25 Oct 2017 14:16) (106/65 - 130/77)  BP(mean): --  RR: 17 (25 Oct 2017 14:16) (16 - 17)  SpO2: 94% (25 Oct 2017 14:16) (94% - 96%)            PHYSICAL EXAM:  GENERAL: Not in distress  CVS: s1 and s2 present  RESP: Air entry B/L  GI: Abdomen soft and nontender  EXT: Right LE lymphedema, the redness and swelling improving  CNS: Awake, alert and oriented X 3            ALLERGIES: NKDA            LABS:                                    13.1   8.0   )-----------( 291      ( 25 Oct 2017 06:20 )             41.0         10-25    136  |  102  |  10  ----------------------------<  121<H>  3.6   |  27  |  0.98    Ca    8.9      25 Oct 2017 06:20  Phos  3.3     10-25  Mg     2.1     10-25    TPro  7.0  /  Alb  2.5<L>  /  TBili  0.3  /  DBili  x   /  AST  13  /  ALT  15  /  AlkPhos  87  10-25      10-25    136  |  102  |  10  ----------------------------<  121<H>  3.6   |  27  |  0.98    Ca    8.9      25 Oct 2017 06:20  Phos  3.3     10-25  Mg     2.1     10-25              POCT Blood Glucose.: 166 mg/dL (21 Oct 2017 15:51)  POCT Blood Glucose.: 140 mg/dL (21 Oct 2017 11:16)  POCT Blood Glucose.: 143 mg/dL (21 Oct 2017 09:44)          MEDICATIONS  (STANDING):  dextrose 5%. 1000 milliLiter(s) (50 mL/Hr) IV Continuous <Continuous>  dextrose 50% Injectable 12.5 Gram(s) IV Push once  dextrose 50% Injectable 25 Gram(s) IV Push once  dextrose 50% Injectable 25 Gram(s) IV Push once  enoxaparin Injectable 40 milliGRAM(s) SubCutaneous daily  furosemide    Tablet 20 milliGRAM(s) Oral daily  hydrochlorothiazide 25 milliGRAM(s) Oral daily  insulin lispro (HumaLOG) corrective regimen sliding scale   SubCutaneous three times a day before meals  latanoprost 0.005% Ophthalmic Solution 1 Drop(s) Both EYES at bedtime  losartan 100 milliGRAM(s) Oral daily  metoprolol 50 milliGRAM(s) Oral two times a day  piperacillin/tazobactam IVPB. 3.375 Gram(s) IV Intermittent every 8 hours  sodium chloride 0.9%. 1000 milliLiter(s) (60 mL/Hr) IV Continuous <Continuous>  tamsulosin 0.4 milliGRAM(s) Oral at bedtime    MEDICATIONS  (PRN):  acetaminophen   Tablet 650 milliGRAM(s) Oral every 6 hours PRN For Temp greater than 38 C (100.4 F)  acetaminophen   Tablet. 650 milliGRAM(s) Oral every 6 hours PRN Mild Pain (1 - 3)  dextrose Gel 1 Dose(s) Oral once PRN Blood Glucose LESS THAN 70 milliGRAM(s)/deciLiter  diphenhydrAMINE   Injectable 50 milliGRAM(s) IV Push every 8 hours PRN Itching  glucagon  Injectable 1 milliGRAM(s) IntraMuscular once PRN Glucose <70 milliGRAM(s)/deciLiter  oxyCODONE    5 mG/acetaminophen 325 mG 1 Tablet(s) Oral every 6 hours PRN Moderate Pain (4 - 6)          RADIOLOGY & ADDITIONAL TESTS:    10/21/17 : US Duplex Venous Lower Ext Complete, Bilateral (10.21.17 @ 12:49) : Unremarkable ultrasound of the bilateral lower extremity deep venous system.            MICROBIOLOGY DATA:    Culture - Surgical Swab (10.24.17 @ 08:42)    Specimen Source: .Surgical Swab right leg ulcer    Culture Results:   Insufficient growth-culture reincubated      Culture - Blood (10.21.17 @ 09:17)    Specimen Source: .Blood Blood-Peripheral    Culture Results:   No growth to date.    Culture - Blood (10.21.17 @ 09:17)    Specimen Source: .Blood Blood-Peripheral    Culture Results:   No growth to date.

## 2017-10-25 NOTE — PROGRESS NOTE ADULT - ASSESSMENT
A  68 yo male with  HTN,  b/l LE edema 2/2 to chronic lymphedema, and recently treated for cellulitis, now  presents to the ER, for evaluation of  increased swelling, redness and drainage from  right leg. He has no fever or chills. He follows up weekly in [facility] wound care. However, have missed last 2 weeks. In the ER, he has no fever, no leukocytosis and doppler negative for DVT. He has started on Zosyn and Vancomycin, blood cultures pending. The ID consult requested to assist with further antibiotic management of RLE cellulitis.       # RLE cellulitis  # Chronic B/L Lymphedema    Would recommend:  1. Follow up swab culture  2. Continue  Vancomycin and Zosyn inpatient  3. Keep LE elevated  4. May  change to oral Augmentin  875 mg q 12hours and Bactrim DS 1 tab q 12hours on discharge for total of 14 days  5. Continue wound care as per Podiatry    d/w Patient and House staff      will follow the patient with you

## 2017-10-25 NOTE — DISCHARGE NOTE ADULT - CARE PLAN
Principal Discharge DX:	Cellulitis of right lower extremity  Goal:	resolve infection  Instructions for follow-up, activity and diet:	Apply topical silvadene cream over wound and put compression dressing 3 times a week at [address] [place] [address] in [place] [address] phone # [phone] with Dr. Healy  Continue with Augmentin 875mg every 12 hours and Bactrim DS 1 tablet every 12 hours for 14 days.  Secondary Diagnosis:	Essential hypertension  Instructions for follow-up, activity and diet:	continue with home medications  Secondary Diagnosis:	Benign prostatic hyperplasia without lower urinary tract symptoms  Instructions for follow-up, activity and diet:	continue with home medications  Secondary Diagnosis:	Glaucoma of both eyes, unspecified glaucoma type  Instructions for follow-up, activity and diet:	continue with home medications Principal Discharge DX:	Cellulitis of right lower extremity  Goal:	resolve infection  Instructions for follow-up, activity and diet:	Apply topical silvadene cream over wound and put compression dressing 3 times a week with Dr. Colby Loo [address] [place] [address] [address]   Continue with Augmentin 875mg every 12 hours and Bactrim DS 1 tablet every 12 hours for 14 days.  Secondary Diagnosis:	Essential hypertension  Instructions for follow-up, activity and diet:	continue with home medications  Secondary Diagnosis:	Benign prostatic hyperplasia without lower urinary tract symptoms  Instructions for follow-up, activity and diet:	continue with home medications  Secondary Diagnosis:	Glaucoma of both eyes, unspecified glaucoma type  Instructions for follow-up, activity and diet:	continue with home medications Principal Discharge DX:	Cellulitis of right lower extremity  Goal:	resolve infection  Instructions for follow-up, activity and diet:	Apply topical silvadene cream over wound and put compression dressing 3 times a week with Dr. Colby Loo [address] [place] [address] [address] phone # [phone] at [facility] wound healing [place] and hyperbaric oxygen   Continue with Augmentin 875mg every 12 hours and Bactrim DS 1 tablet every 12 hours for 14 days.  Secondary Diagnosis:	Essential hypertension  Instructions for follow-up, activity and diet:	continue with home medications  Secondary Diagnosis:	Benign prostatic hyperplasia without lower urinary tract symptoms  Instructions for follow-up, activity and diet:	continue with home medications  Secondary Diagnosis:	Glaucoma of both eyes, unspecified glaucoma type  Instructions for follow-up, activity and diet:	continue with home medications

## 2017-10-25 NOTE — DISCHARGE NOTE ADULT - PLAN OF CARE
resolve infection Apply topical silvadene cream over wound and put compression dressing 3 times a week at [address] [place] [address] in [place] [address] phone # [phone] with Dr. Healy  Continue with Augmentin 875mg every 12 hours and Bactrim DS 1 tablet every 12 hours for 14 days. continue with home medications Apply topical silvadene cream over wound and put compression dressing 3 times a week with Dr. Colby Loo [address] [facility] [address]   Continue with Augmentin 875mg every 12 hours and Bactrim DS 1 tablet every 12 hours for 14 days. Apply topical silvadene cream over wound and put compression dressing 3 times a week with Dr. Colby Loo [address] [facility] [address] phone # [phone] at [facility] wound healing [place] and hyperbaric oxygen   Continue with Augmentin 875mg every 12 hours and Bactrim DS 1 tablet every 12 hours for 14 days.

## 2017-10-25 NOTE — DISCHARGE NOTE ADULT - SECONDARY DIAGNOSIS.
Essential hypertension Benign prostatic hyperplasia without lower urinary tract symptoms Glaucoma of both eyes, unspecified glaucoma type

## 2017-10-25 NOTE — DISCHARGE NOTE ADULT - MEDICATION SUMMARY - MEDICATIONS TO TAKE
I will START or STAY ON the medications listed below when I get home from the hospital:    acetaminophen 325 mg oral tablet  -- 2 tab(s) by mouth every 6 hours, As needed, Mild Pain (1 - 3)  -- Indication: For Cellulitis    losartan 100 mg oral tablet  -- 1 tab(s) by mouth once a day  -- Indication: For HTN (hypertension)    tamsulosin 0.4 mg oral capsule  -- 1 cap(s) by mouth once a day  -- Indication: For BPH (benign prostatic hyperplasia)    metoprolol tartrate 50 mg oral tablet  -- 1 tab(s) by mouth 2 times a day  -- Indication: For HTN (hypertension)    Lasix 20 mg oral tablet  -- 1 tab(s) by mouth once a day  -- Indication: For Lymphedema    hydroCHLOROthiazide 25 mg oral tablet  -- 1 tab(s) by mouth once a day  -- Indication: For Lymphedema    Bactrim  mg-160 mg oral tablet  -- 1 tab(s) by mouth 2 times a day   -- Avoid prolonged or excessive exposure to direct and/or artificial sunlight while taking this medication.  Finish all this medication unless otherwise directed by prescriber.  Medication should be taken with plenty of water.    -- Indication: For Cellulitis    latanoprost 0.005% ophthalmic solution  -- 1 drop(s) to each affected eye once a day (at bedtime)  -- Indication: For Glaucoma    amoxicillin-clavulanate 875 mg-125 mg oral tablet  -- 1 tab(s) by mouth every 12 hours   -- Finish all this medication unless otherwise directed by prescriber.  Take with food or milk.    -- Indication: For Cellulitis

## 2017-10-25 NOTE — DISCHARGE NOTE ADULT - PATIENT PORTAL LINK FT
“You can access the Follow[facility] Patient Portal, offered by [facility], by registering with the following website: [email or web address]”

## 2017-10-25 NOTE — DISCHARGE NOTE ADULT - CARE PROVIDER_API CALL
Florentino Healy (DPM; MD), Surgery  [address] [place] [address]  Phone: [phone]  Fax: [phone]

## 2017-10-25 NOTE — DISCHARGE NOTE ADULT - HOSPITAL COURSE
66 y/o M from shelter. PMH of HTN, cellulitis and bilateral LE edema 2/2 to chronic lymphedema glaucoma presents to ED complaining of increased swelling, redness and discharge from the right leg. Pt states that on a weekly basis, pt goes to [facility] wound care. However, last time pt went was 2 weeks ago. He missed an appt and the doctor was on vacation the following week. Now, pt has increased redness, swelling, and new drainage from legs.     Patient is afebrile in the ED, no leucocytosis, BMP showing hypokalemia of 3.1 ( likely 2/2 to HCTZ and lasix, being replaced), elevated BS: 181. has elevated lactate: 3.0  s/p Ancef and Vancomycin in the ED and 1 L bolus of NS, BCx sent by the ED. Pt was admitted for RLE cellulitis    Cellulitis.  Plan: Patient with RLE cellulitis, b/l LE edema, chronic skin changes with white scaling on the left leg. right leg is red, warm, tender to touch, oozing foul smelling sero-purulent material at several places  c/w IV Vancomycin and Zosyn day #4   wound care consult and negative LE doppler for DVT   negative blood Cx x 2   c/w Tylenol PRN for fever, pain control  c/w Lasix PO for chronic lymphedema  f/u social work eval as patient is homeless  f/u wound Cx to decide abx regimen   ID consulted Dr. Peña.      Problem/Plan - 2:  ·  Problem: HTN (hypertension).  Plan: c/w home meds, hydralazine, metoprolol, Lasix, HCTZ and losartan  Monitor BP  DASH diet.      Problem/Plan - 3:  ·  Problem: Glaucoma.  Plan: c/w latanoprost eye drops. 66 y/o M from shelter. PMH of HTN, cellulitis and bilateral LE edema 2/2 to chronic lymphedema glaucoma presents to ED complaining of increased swelling, redness and discharge from the right leg. Pt states that on a weekly basis, pt goes to [facility] wound care. However, last time pt went was 2 weeks ago. He missed an appt and the doctor was on vacation the following week. Now, pt has increased redness, swelling, and new drainage from legs.     Patient is afebrile in the ED, no leucocytosis, BMP showing hypokalemia of 3.1 ( likely 2/2 to HCTZ and lasix, being replaced), elevated BS: 181. has elevated lactate: 3.0  s/p Ancef and Vancomycin in the ED and 1 L bolus of NS, BCx sent by the ED. Pt was admitted for RLE cellulitis associated to bilateral LE edema, chronic skin changes with white scaling on the left leg, right leg is red, warm, tender to touch, oozing foul smelling sero-purulent material at several places. LE doppler negative for DVT and negative blood Cx x 2 , pt was also continued on Lasix PO for chronic lymphedema. Podiatry consulted Dr. Healy who performed daily wound care with topical silvadene cream with adaptic and compression dressing. ID consulted Dr. Peña who recommended Augmentin 875mg every 12 hours and Bactrim DS 1 tablet every 12 hours for 14 days.      Patient was advised to follow up with outpatient podiatrist with wound care 3 times as week.

## 2017-10-25 NOTE — PROGRESS NOTE ADULT - SUBJECTIVE AND OBJECTIVE BOX
Patient is a 67y old  Male who presents with a chief complaint of redness, pain and swelling of right LE (21 Oct 2017 05:11)    INTERVAL HPI / OVERNIGHT EVENTS:  complains of skin itchiness     T(C): 37 (10-25-17 @ 05:03), Max: 37.2 (10-24-17 @ 20:31)  HR: 72 (10-25-17 @ 05:03) (72 - 74)  BP: 130/77 (10-25-17 @ 05:03) (124/67 - 130/77)  RR: 16 (10-25-17 @ 05:03) (16 - 17)  SpO2: 96% (10-25-17 @ 05:03) (94% - 96%)  I&O's Summary    LABS:                        13.1   8.0   )-----------( 291      ( 25 Oct 2017 06:20 )             41.0     10-25    136  |  102  |  10  ----------------------------<  121<H>  3.6   |  27  |  0.98    Ca    8.9      25 Oct 2017 06:20  Phos  3.3     10-25  Mg     2.1     10-25    TPro  7.0  /  Alb  2.5<L>  /  TBili  0.3  /  DBili  x   /  AST  13  /  ALT  15  /  AlkPhos  87  10-25    CAPILLARY BLOOD GLUCOSE    POCT Blood Glucose.: 123 mg/dL (25 Oct 2017 11:35)  POCT Blood Glucose.: 132 mg/dL (25 Oct 2017 07:39)  POCT Blood Glucose.: 124 mg/dL (24 Oct 2017 21:41)  POCT Blood Glucose.: 130 mg/dL (24 Oct 2017 15:52)    REVIEW OF SYSTEMS:  complains of diffuse skin itchiness worsening over the past 3 months     RADIOLOGY & ADDITIONAL TESTS:    Imaging Personally Reviewed:  [x] YES  [ ] NO    Consultant(s) Notes Reviewed:  [x] YES  [ ] NO    PHYSICAL EXAM:  GENERAL: NAD, well-groomed, well-developed  HEAD:  Atraumatic, Normocephalic  EYES: EOMI, PERRLA, conjunctiva and sclera clear  ENMT: No tonsillar erythema, exudates, or enlargement; Moist mucous membranes, Good dentition, No lesions  NECK: Supple, No JVD, Normal thyroid  NERVOUS SYSTEM:  Alert & Oriented X3, Good concentration; Motor Strength 5/5 B/L upper and lower extremities; DTRs 2+ intact and symmetric  CHEST/LUNG: Clear to percussion bilaterally; No rales, rhonchi, wheezing, or rubs  HEART: Regular rate and rhythm; No murmurs, rubs, or gallops  ABDOMEN: Soft, Nontender, Nondistended; Bowel sounds present  EXTREMITIES:  2+ Peripheral Pulses, No clubbing, cyanosis, or edema  SKIN: No rashes or lesions    Care Discussed with Consultants/Other Providers [x] YES  [ ] NO

## 2017-10-25 NOTE — DISCHARGE NOTE ADULT - MEDICATION SUMMARY - MEDICATIONS TO STOP TAKING
I will STOP taking the medications listed below when I get home from the hospital:    hydrALAZINE 100 mg oral tablet  -- 1 tab(s) by mouth 2 times a day

## 2017-10-25 NOTE — PROGRESS NOTE ADULT - SUBJECTIVE AND OBJECTIVE BOX
67y year old Male seen at bedside in NAD and AAOx3 for Right leg ulceration and swelling with cellulitis  Admitted for RLE cellulitis, pt on IV antibiotics vancomycin and zosyn. Local wound care with silvadene    Vital Signs Last 24 Hrs  T(C): 36.9 (25 Oct 2017 14:16), Max: 37.2 (24 Oct 2017 20:31)  T(F): 98.5 (25 Oct 2017 14:16), Max: 98.9 (24 Oct 2017 20:31)  HR: 66 (25 Oct 2017 14:16) (66 - 72)  BP: 106/65 (25 Oct 2017 14:16) (106/65 - 130/77)  BP(mean): --  RR: 17 (25 Oct 2017 14:16) (16 - 17)  SpO2: 94% (25 Oct 2017 14:16) (94% - 96%)    O:   General: Pleasant  male NAD & AOX3.    Integument:  Skin warm, dry and supple bilateral.    Ulceration Right leg posterior aspect, - hyperkeratotic border, wound base granular, wound size (5.1 cm X 3.6 cm X 0.1 cm) + edema, + liv-wound erythema, - purulence, - fluctuance, - tracking/tunneling, - probe to bone.   Left anterior leg with granular base measuring 2.2 cm x 1.8 cm with some areas of blister + edema, + liv-wound erythema, - purulence, - fluctuance, - tracking/tunneling, - probe to bone.   Vascular: Dorsalis Pedis and Posterior Tibial pulses non-palpable.  Capillary re-fill time less then 3 seconds digits 1-5 bilateral.    Neuro: Protective sensation intact to the level of the digits bilateral.  MSK: Muscle strength 5/5 all major muscle groups bilateral.    LABS:                                    13.1   8.0   )-----------( 291      ( 25 Oct 2017 06:20 )             41.0         10-25    136  |  102  |  10  ----------------------------<  121<H>  3.6   |  27  |  0.98    Ca    8.9      25 Oct 2017 06:20  Phos  3.3     10-25  Mg     2.1     10-25    TPro  7.0  /  Alb  2.5<L>  /  TBili  0.3  /  DBili  x   /  AST  13  /  ALT  15  /  AlkPhos  87  10-25

## 2017-10-26 ENCOUNTER — EMERGENCY (EMERGENCY)
Facility: HOSPITAL | Age: 67
LOS: 1 days | Discharge: HOME | End: 2017-10-26
Attending: EMERGENCY MEDICINE
Payer: COMMERCIAL

## 2017-10-26 VITALS
RESPIRATION RATE: 18 BRPM | TEMPERATURE: 98 F | HEIGHT: 71 IN | HEART RATE: 105 BPM | SYSTOLIC BLOOD PRESSURE: 177 MMHG | DIASTOLIC BLOOD PRESSURE: 90 MMHG | OXYGEN SATURATION: 97 % | WEIGHT: 265 LBS

## 2017-10-26 VITALS
OXYGEN SATURATION: 96 % | SYSTOLIC BLOOD PRESSURE: 133 MMHG | DIASTOLIC BLOOD PRESSURE: 73 MMHG | TEMPERATURE: 98 F | HEART RATE: 78 BPM | RESPIRATION RATE: 16 BRPM

## 2017-10-26 DIAGNOSIS — Z91.041 RADIOGRAPHIC DYE ALLERGY STATUS: ICD-10-CM

## 2017-10-26 DIAGNOSIS — L03.115 CELLULITIS OF RIGHT LOWER LIMB: ICD-10-CM

## 2017-10-26 DIAGNOSIS — Z90.49 ACQUIRED ABSENCE OF OTHER SPECIFIED PARTS OF DIGESTIVE TRACT: Chronic | ICD-10-CM

## 2017-10-26 DIAGNOSIS — Z98.890 OTHER SPECIFIED POSTPROCEDURAL STATES: Chronic | ICD-10-CM

## 2017-10-26 DIAGNOSIS — Z87.891 PERSONAL HISTORY OF NICOTINE DEPENDENCE: ICD-10-CM

## 2017-10-26 DIAGNOSIS — Z59.0 HOMELESSNESS: ICD-10-CM

## 2017-10-26 LAB
CULTURE RESULTS: SIGNIFICANT CHANGE UP
CULTURE RESULTS: SIGNIFICANT CHANGE UP
GLUCOSE BLDC GLUCOMTR-MCNC: 122 MG/DL — HIGH (ref 70–99)
GLUCOSE BLDC GLUCOMTR-MCNC: 141 MG/DL — HIGH (ref 70–99)
SPECIMEN SOURCE: SIGNIFICANT CHANGE UP
SPECIMEN SOURCE: SIGNIFICANT CHANGE UP

## 2017-10-26 PROCEDURE — 84443 ASSAY THYROID STIM HORMONE: CPT

## 2017-10-26 PROCEDURE — 93005 ELECTROCARDIOGRAM TRACING: CPT

## 2017-10-26 PROCEDURE — 99284 EMERGENCY DEPT VISIT MOD MDM: CPT

## 2017-10-26 PROCEDURE — 87040 BLOOD CULTURE FOR BACTERIA: CPT

## 2017-10-26 PROCEDURE — 82306 VITAMIN D 25 HYDROXY: CPT

## 2017-10-26 PROCEDURE — 82607 VITAMIN B-12: CPT

## 2017-10-26 PROCEDURE — 93970 EXTREMITY STUDY: CPT

## 2017-10-26 PROCEDURE — 80076 HEPATIC FUNCTION PANEL: CPT

## 2017-10-26 PROCEDURE — 83735 ASSAY OF MAGNESIUM: CPT

## 2017-10-26 PROCEDURE — 99283 EMERGENCY DEPT VISIT LOW MDM: CPT

## 2017-10-26 PROCEDURE — 82962 GLUCOSE BLOOD TEST: CPT

## 2017-10-26 PROCEDURE — 80048 BASIC METABOLIC PNL TOTAL CA: CPT

## 2017-10-26 PROCEDURE — 80053 COMPREHEN METABOLIC PANEL: CPT

## 2017-10-26 PROCEDURE — 83036 HEMOGLOBIN GLYCOSYLATED A1C: CPT

## 2017-10-26 PROCEDURE — 87070 CULTURE OTHR SPECIMN AEROBIC: CPT

## 2017-10-26 PROCEDURE — 84100 ASSAY OF PHOSPHORUS: CPT

## 2017-10-26 PROCEDURE — 83605 ASSAY OF LACTIC ACID: CPT

## 2017-10-26 PROCEDURE — 80202 ASSAY OF VANCOMYCIN: CPT

## 2017-10-26 PROCEDURE — 99285 EMERGENCY DEPT VISIT HI MDM: CPT | Mod: 25

## 2017-10-26 PROCEDURE — 85027 COMPLETE CBC AUTOMATED: CPT

## 2017-10-26 PROCEDURE — 82746 ASSAY OF FOLIC ACID SERUM: CPT

## 2017-10-26 PROCEDURE — 80061 LIPID PANEL: CPT

## 2017-10-26 RX ORDER — CILOSTAZOL 100 MG/1
1 TABLET ORAL
Qty: 60 | Refills: 0 | OUTPATIENT
Start: 2017-10-26 | End: 2017-11-25

## 2017-10-26 RX ADMIN — Medication 50 MILLIGRAM(S): at 05:41

## 2017-10-26 RX ADMIN — PIPERACILLIN AND TAZOBACTAM 25 GRAM(S): 4; .5 INJECTION, POWDER, LYOPHILIZED, FOR SOLUTION INTRAVENOUS at 05:42

## 2017-10-26 RX ADMIN — LOSARTAN POTASSIUM 100 MILLIGRAM(S): 100 TABLET, FILM COATED ORAL at 05:41

## 2017-10-26 RX ADMIN — Medication 20 MILLIGRAM(S): at 05:41

## 2017-10-26 RX ADMIN — Medication 25 MILLIGRAM(S): at 05:41

## 2017-10-26 SDOH — ECONOMIC STABILITY - HOUSING INSECURITY: HOMELESSNESS: Z59.0

## 2017-10-26 NOTE — ED ADULT NURSE NOTE - OBJECTIVE STATEMENT
patient came back to ed saying that he don't feel comfortable was discharged from [address] [place] [address] in the evening s/p treatment for cellulitis

## 2017-10-26 NOTE — PROGRESS NOTE ADULT - PROBLEM SELECTOR PLAN 2
leg elvation
leg elvation
c/w home meds, hydralazine, metoprolol, Lasix, HCTZ and losartan  Monitor BP  DASH diet

## 2017-10-26 NOTE — ED ADULT TRIAGE NOTE - CHIEF COMPLAINT QUOTE
c/o rt. leg cellulitis,just released from [address] [place] [address] 2 hours ago as per patient he came back for kelby saldaña

## 2017-10-26 NOTE — PROGRESS NOTE ADULT - PROBLEM SELECTOR PLAN 1
iv abx
iv abx  id on case   will get podiatry follow up foot swelling and deformity
Patient with RLE cellulitis, b/l LE edema, chronic skin changes with white scaling on the left leg. right leg is red, warm, tender to touch, oozing foul smelling sero-purulent material at several places  c/w IV Vancomycin and Zosyn day #3   wound care consult and negative LE doppler for DVT   negative blood Cx x 2   c/w Tylenol PRN for fever, pain control  c/w Lasix PO for chronic lymphedema  f/u Vanc Trough in pm   f/u social work eval as patient is homeless  ID consulted Dr. Butt
Patient with RLE cellulitis, b/l LE edema, chronic skin changes with white scaling on the left leg. right leg is red, warm, tender to touch, oozing foul smelling sero-purulent material at several places  c/w IV Vancomycin and Zosyn day #3   wound care consult and negative LE doppler for DVT   negative blood Cx x 2   c/w Tylenol PRN for fever, pain control  c/w Lasix PO for chronic lymphedema  f/u Vanc Trough in pm   f/u social work eval as patient is homeless  ID consulted Dr. Butt
Patient with RLE cellulitis, b/l LE edema, chronic skin changes with white scaling on the left leg. right leg is red, warm, tender to touch, oozing foul smelling sero-purulent material at several places  c/w IV Vancomycin and Zosyn day #4   wound care consult and negative LE doppler for DVT   negative blood Cx x 2   c/w Tylenol PRN for fever, pain control  c/w Lasix PO for chronic lymphedema  f/u social work eval as patient is homeless  f/u wound Cx to decide abx regimen   ID consulted Dr. Peña
Patient with RLE cellulitis, b/l LE edema, chronic skin changes with white scaling on the left leg. right leg is red, warm, tender to touch, oozing foul smelling sero-purulent material at several places  c/w IV Vancomycin and Zosyn day #4   wound care consult and negative LE doppler for DVT   negative blood Cx x 2   c/w Tylenol PRN for fever, pain control  c/w Lasix PO for chronic lymphedema  f/u social work eval as patient is homeless  f/u wound Cx to decide abx regimen   ID consulted Dr. Peña

## 2017-10-26 NOTE — PROGRESS NOTE ADULT - PROBLEM SELECTOR PROBLEM 4
Hypokalemia
BPH (benign prostatic hyperplasia)
Hypokalemia
Prophylactic measure

## 2017-10-26 NOTE — ED PROVIDER NOTE - OBJECTIVE STATEMENT
pt returned to er after being discharged 6 hrs earlier.pt admitted for 6 days with cellulitis to right leg.pt has no place to call home.felt weak/ate pizza/rice/felt cold,and felt he should return to er

## 2017-10-26 NOTE — PROGRESS NOTE ADULT - PROBLEM SELECTOR PLAN 3
losartan
losartan
c/w latanoprost eye drops

## 2017-10-26 NOTE — PROGRESS NOTE ADULT - ATTENDING COMMENTS
change to oral meds   d/c home with home care  patient to see pmd in one week   and continue to attend wound clinic as out patient
patient seen and examined along with resident  above reviewed and agreed
continue other care and meds   id eval reviewed   to repeat labs in am,
continue other care and meds   podiatry follow up
patient was seen and examined along with resident   above reviewed and agreed

## 2017-10-26 NOTE — ED ADULT NURSE NOTE - CHIEF COMPLAINT QUOTE
c/o rt. leg cellulitis,just released from [address] [place] [address] 2 hours ago as per patient he came back for kelby saldaña

## 2017-10-26 NOTE — PROGRESS NOTE ADULT - PROBLEM SELECTOR PLAN 4
resolved
flomax
IMPROVE VTE score:2  Lovenox sc
resolved

## 2017-10-26 NOTE — PROGRESS NOTE ADULT - SUBJECTIVE AND OBJECTIVE BOX
Patient is a 67y old  Male who presents with a chief complaint of redness, pain and swelling of right LE (21 Oct 2017 05:11)    INTERVAL HPI / OVERNIGHT EVENTS:  s doing ok , wants to go home , has appointment tomorrow in wound clinic  .  MEDICATIONS  (STANDING):  dextrose 5%. 1000 milliLiter(s) (50 mL/Hr) IV Continuous <Continuous>  dextrose 50% Injectable 12.5 Gram(s) IV Push once  dextrose 50% Injectable 25 Gram(s) IV Push once  dextrose 50% Injectable 25 Gram(s) IV Push once  enoxaparin Injectable 40 milliGRAM(s) SubCutaneous daily  furosemide    Tablet 20 milliGRAM(s) Oral daily  hydrochlorothiazide 25 milliGRAM(s) Oral daily  insulin lispro (HumaLOG) corrective regimen sliding scale   SubCutaneous three times a day before meals  latanoprost 0.005% Ophthalmic Solution 1 Drop(s) Both EYES at bedtime  losartan 100 milliGRAM(s) Oral daily  metoprolol 50 milliGRAM(s) Oral two times a day  piperacillin/tazobactam IVPB. 3.375 Gram(s) IV Intermittent every 8 hours  sodium chloride 0.9%. 1000 milliLiter(s) (60 mL/Hr) IV Continuous <Continuous>  tamsulosin 0.4 milliGRAM(s) Oral at bedtime    MEDICATIONS  (PRN):  acetaminophen   Tablet 650 milliGRAM(s) Oral every 6 hours PRN For Temp greater than 38 C (100.4 F)  acetaminophen   Tablet. 650 milliGRAM(s) Oral every 6 hours PRN Mild Pain (1 - 3)  dextrose Gel 1 Dose(s) Oral once PRN Blood Glucose LESS THAN 70 milliGRAM(s)/deciLiter  diphenhydrAMINE   Injectable 50 milliGRAM(s) IV Push every 8 hours PRN Itching  glucagon  Injectable 1 milliGRAM(s) IntraMuscular once PRN Glucose <70 milliGRAM(s)/deciLiter  oxyCODONE    5 mG/acetaminophen 325 mG 1 Tablet(s) Oral every 6 hours PRN Moderate Pain (4 - 6)    REVIEW OF SYSTEMS:  complains of diffuse skin itchiness worsening over the past 3 months     RADIOLOGY & ADDITIONAL TESTS:    Imaging Personally Reviewed:  [x] YES  [ ] NO    Consultant(s) Notes Reviewed:  [x] YES  [ ] NO    Vital Signs Last 24 Hrs  T(C): 36.7 (26 Oct 2017 05:30), Max: 36.9 (25 Oct 2017 14:16)  T(F): 98.1 (26 Oct 2017 05:30), Max: 98.5 (25 Oct 2017 14:16)  HR: 70 (26 Oct 2017 05:30) (66 - 71)  BP: 125/75 (26 Oct 2017 05:30) (106/65 - 125/75)  BP(mean): --  RR: 16 (26 Oct 2017 05:30) (16 - 17)  SpO2: 95% (26 Oct 2017 05:30) (94% - 96%)      PHYSICAL EXAM:  GENERAL: NAD, well-groomed, well-developed  HEAD:  Atraumatic, Normocephalic  EYES: EOMI, PERRLA, conjunctiva and sclera clear  ENMT: No tonsillar erythema, exudates, or enlargement; Moist mucous membranes, Good dentition, No lesions  NECK: Supple, No JVD, Normal thyroid  NERVOUS SYSTEM:  Alert & Oriented X3, Good concentration; Motor Strength 5/5 B/L upper and lower extremities; DTRs 2+ intact and symmetric  CHEST/LUNG: Clear to percussion bilaterally; No rales, rhonchi, wheezing, or rubs  HEART: Regular rate and rhythm; No murmurs, rubs, or gallops  ABDOMEN: Soft, Nontender, Nondistended; Bowel sounds present  EXTREMITIES:   Peripheral Pulsesimproved, No clubbing, cyanosis, or edema, redness almost gone , swelling decreased a lot   SKIN: No rashes or lesions  LABS:                        13.1   8.0   )-----------( 291      ( 25 Oct 2017 06:20 )             41.0     10-25    136  |  102  |  10  ----------------------------<  121<H>  3.6   |  27  |  0.98    Ca    8.9      25 Oct 2017 06:20  Phos  3.3     10-25  Mg     2.1     10-25    TPro  7.0  /  Alb  2.5<L>  /  TBili  0.3  /  DBili  x   /  AST  13  /  ALT  15  /  AlkPhos  87  10-25                          Care Discussed with Consultants/Other Providers [x] YES  [ ] NO

## 2017-10-26 NOTE — PROGRESS NOTE ADULT - NSHPATTENDINGPLANDISCUSS_GEN_ALL_CORE
Patient
Patient
resident and patient
resident and patient
rn and patient.
resident  and patient.
resident  and patient.

## 2017-10-27 ENCOUNTER — EMERGENCY (EMERGENCY)
Facility: HOSPITAL | Age: 67
LOS: 1 days | Discharge: HOME | End: 2017-10-27
Attending: EMERGENCY MEDICINE
Payer: COMMERCIAL

## 2017-10-27 ENCOUNTER — APPOINTMENT (OUTPATIENT)
Dept: WOUND CARE | Facility: CLINIC | Age: 67
End: 2017-10-27

## 2017-10-27 VITALS
HEART RATE: 71 BPM | SYSTOLIC BLOOD PRESSURE: 168 MMHG | TEMPERATURE: 98 F | DIASTOLIC BLOOD PRESSURE: 89 MMHG | RESPIRATION RATE: 18 BRPM | OXYGEN SATURATION: 100 %

## 2017-10-27 VITALS
TEMPERATURE: 98 F | OXYGEN SATURATION: 97 % | DIASTOLIC BLOOD PRESSURE: 82 MMHG | SYSTOLIC BLOOD PRESSURE: 147 MMHG | HEIGHT: 71 IN | HEART RATE: 88 BPM | RESPIRATION RATE: 18 BRPM | WEIGHT: 265 LBS

## 2017-10-27 DIAGNOSIS — R10.9 UNSPECIFIED ABDOMINAL PAIN: ICD-10-CM

## 2017-10-27 DIAGNOSIS — Z90.49 ACQUIRED ABSENCE OF OTHER SPECIFIED PARTS OF DIGESTIVE TRACT: Chronic | ICD-10-CM

## 2017-10-27 DIAGNOSIS — R11.0 NAUSEA: ICD-10-CM

## 2017-10-27 DIAGNOSIS — Z98.890 OTHER SPECIFIED POSTPROCEDURAL STATES: Chronic | ICD-10-CM

## 2017-10-27 DIAGNOSIS — L97.919 NON-PRESSURE CHRONIC ULCER OF UNSPECIFIED PART OF RIGHT LOWER LEG WITH UNSPECIFIED SEVERITY: ICD-10-CM

## 2017-10-27 DIAGNOSIS — R53.1 WEAKNESS: ICD-10-CM

## 2017-10-27 LAB
ALBUMIN SERPL ELPH-MCNC: 2.7 G/DL — LOW (ref 3.5–5)
ALP SERPL-CCNC: 97 U/L — SIGNIFICANT CHANGE UP (ref 40–120)
ALT FLD-CCNC: 22 U/L DA — SIGNIFICANT CHANGE UP (ref 10–60)
ANION GAP SERPL CALC-SCNC: 8 MMOL/L — SIGNIFICANT CHANGE UP (ref 5–17)
AST SERPL-CCNC: 30 U/L — SIGNIFICANT CHANGE UP (ref 10–40)
BASOPHILS # BLD AUTO: 0.1 K/UL — SIGNIFICANT CHANGE UP (ref 0–0.2)
BASOPHILS NFR BLD AUTO: 0.8 % — SIGNIFICANT CHANGE UP (ref 0–2)
BILIRUB SERPL-MCNC: 0.3 MG/DL — SIGNIFICANT CHANGE UP (ref 0.2–1.2)
BUN SERPL-MCNC: 23 MG/DL — HIGH (ref 7–18)
CALCIUM SERPL-MCNC: 9.1 MG/DL — SIGNIFICANT CHANGE UP (ref 8.4–10.5)
CHLORIDE SERPL-SCNC: 101 MMOL/L — SIGNIFICANT CHANGE UP (ref 96–108)
CO2 SERPL-SCNC: 26 MMOL/L — SIGNIFICANT CHANGE UP (ref 22–31)
CREAT SERPL-MCNC: 1.1 MG/DL — SIGNIFICANT CHANGE UP (ref 0.5–1.3)
CULTURE RESULTS: SIGNIFICANT CHANGE UP
CULTURE RESULTS: SIGNIFICANT CHANGE UP
EOSINOPHIL # BLD AUTO: 0.2 K/UL — SIGNIFICANT CHANGE UP (ref 0–0.5)
EOSINOPHIL NFR BLD AUTO: 1.8 % — SIGNIFICANT CHANGE UP (ref 0–6)
GLUCOSE SERPL-MCNC: 153 MG/DL — HIGH (ref 70–99)
HCT VFR BLD CALC: 42.2 % — SIGNIFICANT CHANGE UP (ref 39–50)
HGB BLD-MCNC: 13.9 G/DL — SIGNIFICANT CHANGE UP (ref 13–17)
LACTATE SERPL-SCNC: 1.5 MMOL/L — SIGNIFICANT CHANGE UP (ref 0.7–2)
LYMPHOCYTES # BLD AUTO: 1.6 K/UL — SIGNIFICANT CHANGE UP (ref 1–3.3)
LYMPHOCYTES # BLD AUTO: 15.5 % — SIGNIFICANT CHANGE UP (ref 13–44)
MAGNESIUM SERPL-MCNC: 2.2 MG/DL — SIGNIFICANT CHANGE UP (ref 1.6–2.6)
MCHC RBC-ENTMCNC: 30 PG — SIGNIFICANT CHANGE UP (ref 27–34)
MCHC RBC-ENTMCNC: 33 GM/DL — SIGNIFICANT CHANGE UP (ref 32–36)
MCV RBC AUTO: 90.8 FL — SIGNIFICANT CHANGE UP (ref 80–100)
MONOCYTES # BLD AUTO: 0.7 K/UL — SIGNIFICANT CHANGE UP (ref 0–0.9)
MONOCYTES NFR BLD AUTO: 6.7 % — SIGNIFICANT CHANGE UP (ref 2–14)
NEUTROPHILS # BLD AUTO: 7.7 K/UL — HIGH (ref 1.8–7.4)
NEUTROPHILS NFR BLD AUTO: 75.2 % — SIGNIFICANT CHANGE UP (ref 43–77)
PHOSPHATE SERPL-MCNC: 3.1 MG/DL — SIGNIFICANT CHANGE UP (ref 2.5–4.5)
PLATELET # BLD AUTO: 318 K/UL — SIGNIFICANT CHANGE UP (ref 150–400)
POTASSIUM SERPL-MCNC: 4.2 MMOL/L — SIGNIFICANT CHANGE UP (ref 3.5–5.3)
POTASSIUM SERPL-SCNC: 4.2 MMOL/L — SIGNIFICANT CHANGE UP (ref 3.5–5.3)
PROT SERPL-MCNC: 7.5 G/DL — SIGNIFICANT CHANGE UP (ref 6–8.3)
RBC # BLD: 4.64 M/UL — SIGNIFICANT CHANGE UP (ref 4.2–5.8)
RBC # FLD: 13.6 % — SIGNIFICANT CHANGE UP (ref 10.3–14.5)
SODIUM SERPL-SCNC: 135 MMOL/L — SIGNIFICANT CHANGE UP (ref 135–145)
SPECIMEN SOURCE: SIGNIFICANT CHANGE UP
SPECIMEN SOURCE: SIGNIFICANT CHANGE UP
WBC # BLD: 10.3 K/UL — SIGNIFICANT CHANGE UP (ref 3.8–10.5)
WBC # FLD AUTO: 10.3 K/UL — SIGNIFICANT CHANGE UP (ref 3.8–10.5)

## 2017-10-27 PROCEDURE — 99284 EMERGENCY DEPT VISIT MOD MDM: CPT

## 2017-10-27 PROCEDURE — 85027 COMPLETE CBC AUTOMATED: CPT

## 2017-10-27 PROCEDURE — 82962 GLUCOSE BLOOD TEST: CPT

## 2017-10-27 PROCEDURE — 99283 EMERGENCY DEPT VISIT LOW MDM: CPT

## 2017-10-27 PROCEDURE — 83735 ASSAY OF MAGNESIUM: CPT

## 2017-10-27 PROCEDURE — 80053 COMPREHEN METABOLIC PANEL: CPT

## 2017-10-27 PROCEDURE — 83605 ASSAY OF LACTIC ACID: CPT

## 2017-10-27 PROCEDURE — 84100 ASSAY OF PHOSPHORUS: CPT

## 2017-10-27 PROCEDURE — 36000 PLACE NEEDLE IN VEIN: CPT

## 2017-10-27 RX ORDER — SODIUM CHLORIDE 9 MG/ML
1000 INJECTION INTRAMUSCULAR; INTRAVENOUS; SUBCUTANEOUS ONCE
Qty: 0 | Refills: 0 | Status: COMPLETED | OUTPATIENT
Start: 2017-10-27 | End: 2017-10-27

## 2017-10-27 RX ADMIN — Medication 1 TABLET(S): at 15:48

## 2017-10-27 RX ADMIN — SODIUM CHLORIDE 1000 MILLILITER(S): 9 INJECTION INTRAMUSCULAR; INTRAVENOUS; SUBCUTANEOUS at 11:21

## 2017-10-27 NOTE — ED PROVIDER NOTE - PHYSICAL EXAMINATION
Skin: chronic ulcer to the back of the R leg, 5cm area plus 3 smaller ones, minimal oozing, no redness, no signs of infection,

## 2017-10-27 NOTE — ED PROVIDER NOTE - MEDICAL DECISION MAKING DETAILS
66 y/o M pt w/ weakness. Pt recently discharged from being admitted x6 days for cellulitis. Will check basic labs, give fluids and reassess. Consider admission vs discharge.

## 2017-10-27 NOTE — ED PROVIDER NOTE - OBJECTIVE STATEMENT
66 y/o M pt w/ PMHx of HTN, glaucoma, lymphedema presents to the ED c/o weakness. Pt reports that he was in the hospital for 6 days for cellulitis. PT was discharged and returned last night because he felt weak. Associates nausea, abd. pain secondary to symptoms. Denies fever, chills, chest pain, back pain or any other complaints. NKDA.

## 2017-10-29 LAB
CULTURE RESULTS: SIGNIFICANT CHANGE UP
SPECIMEN SOURCE: SIGNIFICANT CHANGE UP

## 2017-10-31 DIAGNOSIS — E87.6 HYPOKALEMIA: ICD-10-CM

## 2017-10-31 DIAGNOSIS — I10 ESSENTIAL (PRIMARY) HYPERTENSION: ICD-10-CM

## 2017-10-31 DIAGNOSIS — I89.0 LYMPHEDEMA, NOT ELSEWHERE CLASSIFIED: ICD-10-CM

## 2017-10-31 DIAGNOSIS — H40.9 UNSPECIFIED GLAUCOMA: ICD-10-CM

## 2017-10-31 DIAGNOSIS — L03.115 CELLULITIS OF RIGHT LOWER LIMB: ICD-10-CM

## 2017-11-03 ENCOUNTER — APPOINTMENT (OUTPATIENT)
Dept: WOUND CARE | Facility: CLINIC | Age: 67
End: 2017-11-03
Payer: MEDICARE

## 2017-11-03 PROCEDURE — 99213 OFFICE O/P EST LOW 20 MIN: CPT

## 2017-11-03 RX ORDER — CILOSTAZOL 50 MG/1
50 TABLET ORAL
Qty: 60 | Refills: 0 | Status: ACTIVE | COMMUNITY
Start: 2017-10-27

## 2017-11-10 ENCOUNTER — APPOINTMENT (OUTPATIENT)
Dept: WOUND CARE | Facility: CLINIC | Age: 67
End: 2017-11-10
Payer: MEDICARE

## 2017-11-10 PROCEDURE — 99213 OFFICE O/P EST LOW 20 MIN: CPT

## 2017-11-10 RX ORDER — LOSARTAN POTASSIUM 100 MG/1
100 TABLET, FILM COATED ORAL
Qty: 30 | Refills: 0 | Status: ACTIVE | COMMUNITY
Start: 2017-07-14

## 2017-11-10 RX ORDER — LOSARTAN POTASSIUM 100 MG/1
1 TABLET, FILM COATED ORAL
Qty: 0 | Refills: 0 | COMMUNITY

## 2017-11-10 RX ORDER — HYDRALAZINE HCL 50 MG
1 TABLET ORAL
Qty: 0 | Refills: 0 | COMMUNITY

## 2017-11-10 RX ORDER — TAMSULOSIN HYDROCHLORIDE 0.4 MG/1
1 CAPSULE ORAL
Qty: 0 | Refills: 0 | COMMUNITY

## 2017-11-10 RX ORDER — HYDRALAZINE HCL 50 MG
0 TABLET ORAL
Qty: 0 | Refills: 0 | COMMUNITY

## 2017-11-10 RX ORDER — LATANOPROST/PF 0.005 %
0.01 DROPS OPHTHALMIC (EYE)
Qty: 2 | Refills: 0 | Status: ACTIVE | COMMUNITY
Start: 2017-05-16

## 2017-11-10 RX ORDER — FUROSEMIDE 40 MG
1 TABLET ORAL
Qty: 0 | Refills: 0 | COMMUNITY

## 2017-11-10 RX ORDER — SULFAMETHOXAZOLE AND TRIMETHOPRIM 800; 160 MG/1; MG/1
800-160 TABLET ORAL
Qty: 28 | Refills: 0 | Status: COMPLETED | COMMUNITY
Start: 2017-10-25

## 2017-11-10 RX ORDER — METOPROLOL TARTRATE 50 MG
0 TABLET ORAL
Qty: 0 | Refills: 0 | COMMUNITY

## 2017-11-10 RX ORDER — LOSARTAN POTASSIUM 100 MG/1
0 TABLET, FILM COATED ORAL
Qty: 0 | Refills: 0 | COMMUNITY

## 2017-11-10 RX ORDER — AMOXICILLIN AND CLAVULANATE POTASSIUM 875; 125 MG/1; MG/1
875-125 TABLET, COATED ORAL
Qty: 28 | Refills: 0 | Status: COMPLETED | COMMUNITY
Start: 2017-10-25

## 2017-11-10 RX ORDER — DOXYCYCLINE HYCLATE 100 MG/1
100 TABLET ORAL
Qty: 10 | Refills: 0 | Status: COMPLETED | COMMUNITY
Start: 2017-08-09

## 2017-11-10 RX ORDER — ASPIRIN/CALCIUM CARB/MAGNESIUM 324 MG
1 TABLET ORAL
Qty: 0 | Refills: 0 | COMMUNITY

## 2017-11-10 RX ORDER — LATANOPROST 0.05 MG/ML
1 SOLUTION/ DROPS OPHTHALMIC; TOPICAL
Qty: 0 | Refills: 0 | COMMUNITY

## 2017-11-10 RX ORDER — FUROSEMIDE 20 MG/1
20 TABLET ORAL
Qty: 30 | Refills: 0 | Status: COMPLETED | COMMUNITY
Start: 2017-06-02

## 2017-11-10 RX ORDER — HYDRALAZINE HYDROCHLORIDE 100 MG/1
100 TABLET ORAL
Qty: 60 | Refills: 0 | Status: COMPLETED | COMMUNITY
Start: 2017-05-16

## 2017-11-10 RX ORDER — FUROSEMIDE 40 MG
0 TABLET ORAL
Qty: 0 | Refills: 0 | COMMUNITY

## 2017-11-10 RX ORDER — POTASSIUM CHLORIDE 20 MEQ
1 PACKET (EA) ORAL
Qty: 0 | Refills: 0 | COMMUNITY

## 2017-11-10 RX ORDER — METOPROLOL TARTRATE 50 MG
1 TABLET ORAL
Qty: 0 | Refills: 0 | COMMUNITY

## 2017-11-10 RX ORDER — CHOLECALCIFEROL (VITAMIN D3) 125 MCG
1 CAPSULE ORAL
Qty: 0 | Refills: 0 | COMMUNITY

## 2017-11-17 ENCOUNTER — APPOINTMENT (OUTPATIENT)
Dept: WOUND CARE | Facility: CLINIC | Age: 67
End: 2017-11-17
Payer: MEDICARE

## 2017-11-17 PROCEDURE — 99213 OFFICE O/P EST LOW 20 MIN: CPT

## 2017-11-18 PROBLEM — I89.0 LYMPHEDEMA, NOT ELSEWHERE CLASSIFIED: Chronic | Status: ACTIVE | Noted: 2017-06-09

## 2017-11-18 PROBLEM — I87.2 VENOUS INSUFFICIENCY (CHRONIC) (PERIPHERAL): Chronic | Status: ACTIVE | Noted: 2017-06-09

## 2017-11-22 ENCOUNTER — APPOINTMENT (OUTPATIENT)
Dept: WOUND CARE | Facility: CLINIC | Age: 67
End: 2017-11-22
Payer: MEDICARE

## 2017-11-22 VITALS
BODY MASS INDEX: 30.8 KG/M2 | RESPIRATION RATE: 16 BRPM | HEART RATE: 73 BPM | DIASTOLIC BLOOD PRESSURE: 71 MMHG | TEMPERATURE: 97.6 F | WEIGHT: 220 LBS | SYSTOLIC BLOOD PRESSURE: 136 MMHG | HEIGHT: 71 IN

## 2017-11-22 PROCEDURE — 29581 APPL MULTLAYER CMPRN SYS LEG: CPT | Mod: 50

## 2017-12-01 ENCOUNTER — APPOINTMENT (OUTPATIENT)
Dept: WOUND CARE | Facility: CLINIC | Age: 67
End: 2017-12-01
Payer: MEDICARE

## 2017-12-01 ENCOUNTER — APPOINTMENT (OUTPATIENT)
Dept: VASCULAR SURGERY | Facility: CLINIC | Age: 67
End: 2017-12-01
Payer: MEDICARE

## 2017-12-01 VITALS
DIASTOLIC BLOOD PRESSURE: 72 MMHG | HEIGHT: 71 IN | BODY MASS INDEX: 30.8 KG/M2 | WEIGHT: 220 LBS | RESPIRATION RATE: 16 BRPM | TEMPERATURE: 98.3 F | SYSTOLIC BLOOD PRESSURE: 128 MMHG | HEART RATE: 70 BPM

## 2017-12-01 PROCEDURE — 99213 OFFICE O/P EST LOW 20 MIN: CPT

## 2017-12-01 PROCEDURE — 93970 EXTREMITY STUDY: CPT

## 2017-12-08 ENCOUNTER — APPOINTMENT (OUTPATIENT)
Dept: WOUND CARE | Facility: CLINIC | Age: 67
End: 2017-12-08
Payer: MEDICARE

## 2017-12-08 PROCEDURE — 29581 APPL MULTLAYER CMPRN SYS LEG: CPT | Mod: 50

## 2017-12-15 ENCOUNTER — APPOINTMENT (OUTPATIENT)
Dept: WOUND CARE | Facility: CLINIC | Age: 67
End: 2017-12-15
Payer: MEDICARE

## 2017-12-15 PROCEDURE — 99213 OFFICE O/P EST LOW 20 MIN: CPT

## 2017-12-22 ENCOUNTER — APPOINTMENT (OUTPATIENT)
Dept: WOUND CARE | Facility: CLINIC | Age: 67
End: 2017-12-22
Payer: MEDICARE

## 2017-12-22 PROCEDURE — 99213 OFFICE O/P EST LOW 20 MIN: CPT

## 2017-12-22 RX ORDER — CALCIUM CITRATE/VITAMIN D3 315MG-6.25
250-62.5 TABLET ORAL
Refills: 0 | Status: DISCONTINUED | COMMUNITY
End: 2017-12-22

## 2017-12-22 RX ORDER — CIPROFLOXACIN HYDROCHLORIDE 500 MG/1
500 TABLET, FILM COATED ORAL
Qty: 14 | Refills: 0 | Status: DISCONTINUED | COMMUNITY
Start: 2017-07-28 | End: 2017-12-22

## 2017-12-22 RX ORDER — ACETAMINOPHEN/DIPHENHYDRAMINE 500MG-25MG
1000 TABLET ORAL
Refills: 0 | Status: ACTIVE | COMMUNITY
Start: 2017-12-22

## 2017-12-29 ENCOUNTER — APPOINTMENT (OUTPATIENT)
Dept: WOUND CARE | Facility: CLINIC | Age: 67
End: 2017-12-29
Payer: MEDICARE

## 2017-12-29 PROCEDURE — 99214 OFFICE O/P EST MOD 30 MIN: CPT

## 2018-01-05 ENCOUNTER — APPOINTMENT (OUTPATIENT)
Dept: WOUND CARE | Facility: CLINIC | Age: 68
End: 2018-01-05
Payer: MEDICARE

## 2018-01-05 VITALS
SYSTOLIC BLOOD PRESSURE: 138 MMHG | HEART RATE: 77 BPM | RESPIRATION RATE: 16 BRPM | WEIGHT: 220 LBS | DIASTOLIC BLOOD PRESSURE: 76 MMHG | TEMPERATURE: 98.4 F | HEIGHT: 71 IN | BODY MASS INDEX: 30.8 KG/M2

## 2018-01-05 PROCEDURE — 29581 APPL MULTLAYER CMPRN SYS LEG: CPT | Mod: RT

## 2018-01-12 ENCOUNTER — APPOINTMENT (OUTPATIENT)
Dept: WOUND CARE | Facility: CLINIC | Age: 68
End: 2018-01-12
Payer: MEDICARE

## 2018-01-12 VITALS
HEART RATE: 60 BPM | BODY MASS INDEX: 30.8 KG/M2 | SYSTOLIC BLOOD PRESSURE: 135 MMHG | WEIGHT: 220 LBS | HEIGHT: 71 IN | RESPIRATION RATE: 16 BRPM | TEMPERATURE: 97.7 F | DIASTOLIC BLOOD PRESSURE: 71 MMHG

## 2018-01-12 PROCEDURE — 29581 APPL MULTLAYER CMPRN SYS LEG: CPT | Mod: 50

## 2018-01-19 ENCOUNTER — APPOINTMENT (OUTPATIENT)
Dept: WOUND CARE | Facility: CLINIC | Age: 68
End: 2018-01-19
Payer: MEDICARE

## 2018-01-19 PROCEDURE — 99213 OFFICE O/P EST LOW 20 MIN: CPT

## 2018-02-20 ENCOUNTER — APPOINTMENT (OUTPATIENT)
Dept: WOUND CARE | Facility: CLINIC | Age: 68
End: 2018-02-20
Payer: MEDICARE

## 2018-02-20 VITALS
TEMPERATURE: 97.4 F | WEIGHT: 260 LBS | HEART RATE: 66 BPM | HEIGHT: 71 IN | BODY MASS INDEX: 36.4 KG/M2 | DIASTOLIC BLOOD PRESSURE: 96 MMHG | SYSTOLIC BLOOD PRESSURE: 156 MMHG

## 2018-02-20 PROCEDURE — 99213 OFFICE O/P EST LOW 20 MIN: CPT

## 2018-05-22 ENCOUNTER — APPOINTMENT (OUTPATIENT)
Dept: WOUND CARE | Facility: CLINIC | Age: 68
End: 2018-05-22
Payer: MEDICARE

## 2018-05-22 VITALS
SYSTOLIC BLOOD PRESSURE: 152 MMHG | HEART RATE: 71 BPM | DIASTOLIC BLOOD PRESSURE: 82 MMHG | TEMPERATURE: 98.1 F | RESPIRATION RATE: 18 BRPM

## 2018-05-22 PROCEDURE — 99214 OFFICE O/P EST MOD 30 MIN: CPT

## 2018-05-29 ENCOUNTER — APPOINTMENT (OUTPATIENT)
Dept: WOUND CARE | Facility: CLINIC | Age: 68
End: 2018-05-29
Payer: MEDICARE

## 2018-05-29 VITALS
TEMPERATURE: 98 F | HEIGHT: 71 IN | DIASTOLIC BLOOD PRESSURE: 81 MMHG | WEIGHT: 260 LBS | RESPIRATION RATE: 18 BRPM | HEART RATE: 57 BPM | BODY MASS INDEX: 36.4 KG/M2 | SYSTOLIC BLOOD PRESSURE: 134 MMHG

## 2018-05-29 PROCEDURE — 99213 OFFICE O/P EST LOW 20 MIN: CPT

## 2018-06-05 ENCOUNTER — APPOINTMENT (OUTPATIENT)
Dept: WOUND CARE | Facility: CLINIC | Age: 68
End: 2018-06-05
Payer: MEDICARE

## 2018-06-05 PROCEDURE — 99213 OFFICE O/P EST LOW 20 MIN: CPT

## 2018-06-12 ENCOUNTER — APPOINTMENT (OUTPATIENT)
Dept: WOUND CARE | Facility: CLINIC | Age: 68
End: 2018-06-12
Payer: MEDICARE

## 2018-06-12 VITALS — DIASTOLIC BLOOD PRESSURE: 87 MMHG | TEMPERATURE: 98 F | SYSTOLIC BLOOD PRESSURE: 145 MMHG | HEART RATE: 73 BPM

## 2018-06-12 PROCEDURE — 99213 OFFICE O/P EST LOW 20 MIN: CPT

## 2018-06-19 ENCOUNTER — APPOINTMENT (OUTPATIENT)
Dept: WOUND CARE | Facility: CLINIC | Age: 68
End: 2018-06-19
Payer: MEDICARE

## 2018-06-19 VITALS
DIASTOLIC BLOOD PRESSURE: 84 MMHG | HEART RATE: 64 BPM | WEIGHT: 260 LBS | HEIGHT: 71 IN | SYSTOLIC BLOOD PRESSURE: 135 MMHG | TEMPERATURE: 97.3 F | BODY MASS INDEX: 36.4 KG/M2

## 2018-06-19 PROCEDURE — 99213 OFFICE O/P EST LOW 20 MIN: CPT

## 2018-06-26 ENCOUNTER — APPOINTMENT (OUTPATIENT)
Dept: WOUND CARE | Facility: CLINIC | Age: 68
End: 2018-06-26
Payer: MEDICARE

## 2018-06-26 PROCEDURE — 99213 OFFICE O/P EST LOW 20 MIN: CPT

## 2018-08-17 NOTE — ED PROVIDER NOTE - CROS ED ROS STATEMENT
RADIOLOGY PROCEDURE NOTE  Patient name: Meggan Law  MRN: [id number]  : 1956    Pre-procedure diagnosis: right effusion  Post-procedure diagnosis: Same    Procedure Date/Time: 2018  2:25 PM  Procedure: right thoracentesis  Estimated blood loss: None  Specimen(s) collected with description: pleural fluid  The patient tolerated the procedure well with no immediate complications.  Significant findings:large right effusion    See imaging dictation for procedural details.    Provider name: Grabiel Aj  Assistant(s):None       all other ROS negative except as per HPI

## 2018-08-21 ENCOUNTER — APPOINTMENT (OUTPATIENT)
Dept: WOUND CARE | Facility: CLINIC | Age: 68
End: 2018-08-21
Payer: MEDICARE

## 2018-08-21 VITALS
HEIGHT: 71 IN | HEART RATE: 66 BPM | TEMPERATURE: 98.1 F | DIASTOLIC BLOOD PRESSURE: 84 MMHG | WEIGHT: 255 LBS | SYSTOLIC BLOOD PRESSURE: 157 MMHG | BODY MASS INDEX: 35.7 KG/M2

## 2018-08-21 PROCEDURE — 99212 OFFICE O/P EST SF 10 MIN: CPT

## 2018-08-22 PROBLEM — I10 ESSENTIAL (PRIMARY) HYPERTENSION: Chronic | Status: ACTIVE | Noted: 2017-08-05

## 2018-08-22 PROBLEM — L03.90 CELLULITIS, UNSPECIFIED: Chronic | Status: ACTIVE | Noted: 2017-08-05

## 2018-08-22 PROBLEM — I89.0 LYMPHEDEMA, NOT ELSEWHERE CLASSIFIED: Chronic | Status: ACTIVE | Noted: 2017-08-05

## 2018-08-22 PROBLEM — H40.9 UNSPECIFIED GLAUCOMA: Chronic | Status: ACTIVE | Noted: 2017-08-05

## 2018-08-22 PROBLEM — I10 ESSENTIAL (PRIMARY) HYPERTENSION: Chronic | Status: ACTIVE | Noted: 2017-06-09

## 2018-08-22 PROBLEM — M79.606 PAIN IN LEG, UNSPECIFIED: Chronic | Status: ACTIVE | Noted: 2017-07-08

## 2018-09-18 ENCOUNTER — APPOINTMENT (OUTPATIENT)
Dept: WOUND CARE | Facility: CLINIC | Age: 68
End: 2018-09-18
Payer: MEDICARE

## 2018-09-18 VITALS — HEART RATE: 63 BPM | SYSTOLIC BLOOD PRESSURE: 155 MMHG | TEMPERATURE: 97.8 F | DIASTOLIC BLOOD PRESSURE: 91 MMHG

## 2018-09-18 PROCEDURE — 99213 OFFICE O/P EST LOW 20 MIN: CPT

## 2018-10-23 ENCOUNTER — APPOINTMENT (OUTPATIENT)
Dept: WOUND CARE | Facility: CLINIC | Age: 68
End: 2018-10-23
Payer: MEDICARE

## 2018-10-23 VITALS — SYSTOLIC BLOOD PRESSURE: 145 MMHG | TEMPERATURE: 97.8 F | DIASTOLIC BLOOD PRESSURE: 83 MMHG | HEART RATE: 71 BPM

## 2018-10-23 DIAGNOSIS — E66.3 OVERWEIGHT: ICD-10-CM

## 2018-10-23 PROCEDURE — 99213 OFFICE O/P EST LOW 20 MIN: CPT

## 2018-11-27 ENCOUNTER — APPOINTMENT (OUTPATIENT)
Dept: WOUND CARE | Facility: CLINIC | Age: 68
End: 2018-11-27
Payer: MEDICARE

## 2018-11-27 PROCEDURE — 99213 OFFICE O/P EST LOW 20 MIN: CPT

## 2019-01-09 ENCOUNTER — APPOINTMENT (OUTPATIENT)
Dept: SURGERY | Facility: CLINIC | Age: 69
End: 2019-01-09

## 2019-01-14 ENCOUNTER — APPOINTMENT (OUTPATIENT)
Dept: SURGERY | Facility: CLINIC | Age: 69
End: 2019-01-14
Payer: MEDICARE

## 2019-01-14 ENCOUNTER — OUTPATIENT (OUTPATIENT)
Dept: OUTPATIENT SERVICES | Facility: HOSPITAL | Age: 69
LOS: 1 days | End: 2019-01-14
Payer: COMMERCIAL

## 2019-01-14 VITALS
WEIGHT: 260 LBS | BODY MASS INDEX: 36.4 KG/M2 | DIASTOLIC BLOOD PRESSURE: 88 MMHG | HEIGHT: 71 IN | HEART RATE: 67 BPM | TEMPERATURE: 98 F | SYSTOLIC BLOOD PRESSURE: 157 MMHG

## 2019-01-14 DIAGNOSIS — Z98.890 OTHER SPECIFIED POSTPROCEDURAL STATES: Chronic | ICD-10-CM

## 2019-01-14 DIAGNOSIS — Z90.49 ACQUIRED ABSENCE OF OTHER SPECIFIED PARTS OF DIGESTIVE TRACT: Chronic | ICD-10-CM

## 2019-01-14 DIAGNOSIS — Z00.00 ENCOUNTER FOR GENERAL ADULT MEDICAL EXAMINATION WITHOUT ABNORMAL FINDINGS: ICD-10-CM

## 2019-01-14 PROBLEM — E66.3 OVERWEIGHT: Status: ACTIVE | Noted: 2017-09-21

## 2019-01-14 PROCEDURE — G0463: CPT

## 2019-01-14 PROCEDURE — 99213 OFFICE O/P EST LOW 20 MIN: CPT

## 2019-01-14 NOTE — HISTORY OF PRESENT ILLNESS
[FreeTextEntry1] : 68year old male being treated for right leg cluster ulcers.  using pump, much better\par pt has massive swelling of the right leg with a case of lymphedema.long standing\par  pt has no clinical signs of infection, no fever\par Primary dressing is unna boots  \par Patient in shelter and was homeless for 6 years.  Patient receiving social security.  \par 12/22/17-patient mistakenly referred to a text message which he suspected was sent from Dr Loo- He accepts correction\par \par has lymphedema pump, currently inoperative\par

## 2019-01-14 NOTE — PHYSICAL EXAM
[JVD] : no jugular venous distention  [Ankle Swelling (On Exam)] : present [] : bilaterally [Ankle Swelling Bilaterally] : severe [Abdomen Tenderness] : ~T ~M No abdominal tenderness [Alert] : alert [Oriented to Person] : oriented to person [Oriented to Place] : oriented to place [Oriented to Time] : oriented to time [de-identified] : nad, ambulatory, overweight [de-identified] : atraumatic [de-identified] : no wheezing [de-identified] : ambulatory [FreeTextEntry1] : medial leg [FreeTextEntry2] : 1 cm [FreeTextEntry3] : 0.5 cm [FreeTextEntry4] : 0.2 cm [FreeTextEntry5] : #3 curette [de-identified] : sub q tissue [de-identified] : unna boot  [de-identified] : heidy [de-identified] : 42.5cm 1/14/19, 25.5cm\par \par  calf  41.5  ankle 28\par  [de-identified] : 1/14/19 41.5cm  calf, 25.5\par  gsv closed off \par right 29/ 40.5- length 44\par ablation on right

## 2019-01-14 NOTE — ASSESSMENT
[FreeTextEntry1] : 68 year old male being treated  for lymphedema , using 2 layer socks, much better\par no clinical signs of infection; \par received CircAid and lymphedema pump- likes juzo better\par primary dressing\par heidy, 4x4 gauze and stocking compression \par ankle 26.5\par calf 46.5 \par length 38 cm\par Dequan lopez is the  [phone]; \par 12/22/17- overall improvement of both legs is related to availability  of a residence in [facility]\par  \par He is living  [address] [place] [address]\par [address]\par \par pt will follow up in 1 week\par \par

## 2019-01-17 DIAGNOSIS — I87.2 VENOUS INSUFFICIENCY (CHRONIC) (PERIPHERAL): ICD-10-CM

## 2019-01-17 DIAGNOSIS — Z86.79 PERSONAL HISTORY OF OTHER DISEASES OF THE CIRCULATORY SYSTEM: ICD-10-CM

## 2019-01-17 DIAGNOSIS — Z59.0 HOMELESSNESS: ICD-10-CM

## 2019-01-17 DIAGNOSIS — S81.801D UNSPECIFIED OPEN WOUND, RIGHT LOWER LEG, SUBSEQUENT ENCOUNTER: ICD-10-CM

## 2019-01-17 DIAGNOSIS — Z78.9 OTHER SPECIFIED HEALTH STATUS: ICD-10-CM

## 2019-01-17 DIAGNOSIS — I89.0 LYMPHEDEMA, NOT ELSEWHERE CLASSIFIED: ICD-10-CM

## 2019-01-17 DIAGNOSIS — S81.802D UNSPECIFIED OPEN WOUND, LEFT LOWER LEG, SUBSEQUENT ENCOUNTER: ICD-10-CM

## 2019-01-17 SDOH — ECONOMIC STABILITY - HOUSING INSECURITY: HOMELESSNESS: Z59.0

## 2019-02-25 ENCOUNTER — APPOINTMENT (OUTPATIENT)
Dept: SURGERY | Facility: CLINIC | Age: 69
End: 2019-02-25
Payer: MEDICARE

## 2019-02-25 ENCOUNTER — OUTPATIENT (OUTPATIENT)
Dept: OUTPATIENT SERVICES | Facility: HOSPITAL | Age: 69
LOS: 1 days | End: 2019-02-25
Payer: MEDICARE

## 2019-02-25 VITALS
DIASTOLIC BLOOD PRESSURE: 68 MMHG | TEMPERATURE: 97.4 F | SYSTOLIC BLOOD PRESSURE: 131 MMHG | HEIGHT: 71 IN | HEART RATE: 65 BPM | OXYGEN SATURATION: 98 % | WEIGHT: 258 LBS | RESPIRATION RATE: 18 BRPM | BODY MASS INDEX: 36.12 KG/M2

## 2019-02-25 DIAGNOSIS — Z00.00 ENCOUNTER FOR GENERAL ADULT MEDICAL EXAMINATION WITHOUT ABNORMAL FINDINGS: ICD-10-CM

## 2019-02-25 DIAGNOSIS — Z98.890 OTHER SPECIFIED POSTPROCEDURAL STATES: Chronic | ICD-10-CM

## 2019-02-25 DIAGNOSIS — Z90.49 ACQUIRED ABSENCE OF OTHER SPECIFIED PARTS OF DIGESTIVE TRACT: Chronic | ICD-10-CM

## 2019-02-25 PROCEDURE — G0463: CPT

## 2019-02-25 PROCEDURE — 99213 OFFICE O/P EST LOW 20 MIN: CPT

## 2019-02-26 DIAGNOSIS — I87.2 VENOUS INSUFFICIENCY (CHRONIC) (PERIPHERAL): ICD-10-CM

## 2019-02-26 DIAGNOSIS — S81.801D UNSPECIFIED OPEN WOUND, RIGHT LOWER LEG, SUBSEQUENT ENCOUNTER: ICD-10-CM

## 2019-02-26 DIAGNOSIS — I89.0 LYMPHEDEMA, NOT ELSEWHERE CLASSIFIED: ICD-10-CM

## 2019-02-26 DIAGNOSIS — Z78.9 OTHER SPECIFIED HEALTH STATUS: ICD-10-CM

## 2019-02-26 DIAGNOSIS — I87.309 CHRONIC VENOUS HYPERTENSION (IDIOPATHIC) WITHOUT COMPLICATIONS OF UNSPECIFIED LOWER EXTREMITY: ICD-10-CM

## 2019-02-26 DIAGNOSIS — S81.802A UNSPECIFIED OPEN WOUND, LEFT LOWER LEG, INITIAL ENCOUNTER: ICD-10-CM

## 2019-04-01 ENCOUNTER — APPOINTMENT (OUTPATIENT)
Dept: SURGERY | Facility: CLINIC | Age: 69
End: 2019-04-01

## 2019-04-08 ENCOUNTER — OUTPATIENT (OUTPATIENT)
Dept: OUTPATIENT SERVICES | Facility: HOSPITAL | Age: 69
LOS: 1 days | End: 2019-04-08
Payer: MEDICARE

## 2019-04-08 ENCOUNTER — APPOINTMENT (OUTPATIENT)
Dept: SURGERY | Facility: CLINIC | Age: 69
End: 2019-04-08
Payer: MEDICARE

## 2019-04-08 VITALS
WEIGHT: 258 LBS | BODY MASS INDEX: 36.12 KG/M2 | DIASTOLIC BLOOD PRESSURE: 71 MMHG | HEIGHT: 71 IN | TEMPERATURE: 97.3 F | SYSTOLIC BLOOD PRESSURE: 144 MMHG | HEART RATE: 66 BPM

## 2019-04-08 DIAGNOSIS — Z98.890 OTHER SPECIFIED POSTPROCEDURAL STATES: Chronic | ICD-10-CM

## 2019-04-08 DIAGNOSIS — Z00.00 ENCOUNTER FOR GENERAL ADULT MEDICAL EXAMINATION WITHOUT ABNORMAL FINDINGS: ICD-10-CM

## 2019-04-08 DIAGNOSIS — Z86.79 PERSONAL HISTORY OF OTHER DISEASES OF THE CIRCULATORY SYSTEM: ICD-10-CM

## 2019-04-08 DIAGNOSIS — Z90.49 ACQUIRED ABSENCE OF OTHER SPECIFIED PARTS OF DIGESTIVE TRACT: Chronic | ICD-10-CM

## 2019-04-08 PROCEDURE — G0463: CPT

## 2019-04-08 PROCEDURE — 99213 OFFICE O/P EST LOW 20 MIN: CPT

## 2019-04-08 RX ORDER — FUROSEMIDE 80 MG/1
TABLET ORAL
Refills: 0 | Status: ACTIVE | COMMUNITY

## 2019-04-08 RX ORDER — HYDROCHLOROTHIAZIDE 12.5 MG/1
TABLET ORAL
Refills: 0 | Status: COMPLETED | COMMUNITY
End: 2019-04-08

## 2019-04-08 RX ORDER — METOPROLOL TARTRATE 50 MG/1
50 TABLET, FILM COATED ORAL
Refills: 0 | Status: COMPLETED | COMMUNITY
End: 2019-04-08

## 2019-04-10 DIAGNOSIS — I87.2 VENOUS INSUFFICIENCY (CHRONIC) (PERIPHERAL): ICD-10-CM

## 2019-04-10 DIAGNOSIS — Z86.79 PERSONAL HISTORY OF OTHER DISEASES OF THE CIRCULATORY SYSTEM: ICD-10-CM

## 2019-04-10 DIAGNOSIS — I89.0 LYMPHEDEMA, NOT ELSEWHERE CLASSIFIED: ICD-10-CM

## 2019-04-10 DIAGNOSIS — I87.303 CHRONIC VENOUS HYPERTENSION (IDIOPATHIC) WITHOUT COMPLICATIONS OF BILATERAL LOWER EXTREMITY: ICD-10-CM

## 2019-05-14 NOTE — ASSESSMENT
[FreeTextEntry1] : 69 year old male being treated  for lymphedema , using 2 layer socks, much better\par no clinical signs of infection; \par received CircAid and lymphedema pump- likes maynor/lauryn better\par primary dressing\par heidy, 4x4 gauze and stocking compression \par  \par Dequan john is the  [phone]; \par 12/22/17- overall improvement of both legs is related to availability  [facility]\par  He is living  [address] [place] [address]\par [address]\par \par pt will follow up in 1 week\par \par

## 2019-05-14 NOTE — HISTORY OF PRESENT ILLNESS
[FreeTextEntry1] : 69 year old male being treated for right leg cluster ulcers.now closed,\par  no ccellulitis, needs to see podiatry for toe nail fungus,needs new pair of socks for both legs\par   using pump, much better still homeless can not use pump regularly\par pt had massive swelling of the right leg with a case of lymphedema.long standing\par  pt has no clinical signs of infection, no fever\par   \par Patient in shelter and was homeless for 6 years.  Patient receiving social security.  \par has lymphedema pump, currently inoperative\par

## 2019-05-14 NOTE — PHYSICAL EXAM
[Normal Breath Sounds] : Normal breath sounds [Normal Rate and Rhythm] : normal rate and rhythm [Ankle Swelling (On Exam)] : present [] : bilaterally [Ankle Swelling Bilaterally] : severe [Alert] : alert [Oriented to Person] : oriented to person [Oriented to Place] : oriented to place [Oriented to Time] : oriented to time [Calm] : calm [JVD] : no jugular venous distention  [Abdomen Tenderness] : ~T ~M No abdominal tenderness [de-identified] : nad, ambulatory, overweight [de-identified] : no wheezing [de-identified] : ambulatory [FreeTextEntry1] : medial leg [FreeTextEntry2] : .1 [FreeTextEntry3] : .1 [FreeTextEntry4] : .1 [de-identified] :  sock [de-identified] : telfa [de-identified] : 4 2/25/19  ankle25 /41.5 42.5cm 1/14/19, 25.5cm  previous calf  41.5  ankle 28  [FreeTextEntry7] : right lower leg [FreeTextEntry8] : .1 [FreeTextEntry9] : .1 [de-identified] : .1 [de-identified] : 26.5 4/8, 40.5\par 2/25/19  ankle 27.5 calf 40.5\par \par 1/14/19 41.5cm  calf, 25.5\par  gsv closed off \par right 29/ 40.5- length 44\par ablation on right

## 2019-05-20 ENCOUNTER — OUTPATIENT (OUTPATIENT)
Dept: OUTPATIENT SERVICES | Facility: HOSPITAL | Age: 69
LOS: 1 days | End: 2019-05-20
Payer: MEDICARE

## 2019-05-20 ENCOUNTER — APPOINTMENT (OUTPATIENT)
Dept: SURGERY | Facility: CLINIC | Age: 69
End: 2019-05-20
Payer: MEDICARE

## 2019-05-20 VITALS
HEART RATE: 65 BPM | DIASTOLIC BLOOD PRESSURE: 74 MMHG | WEIGHT: 259 LBS | HEIGHT: 71 IN | TEMPERATURE: 97.8 F | BODY MASS INDEX: 36.26 KG/M2 | SYSTOLIC BLOOD PRESSURE: 133 MMHG

## 2019-05-20 DIAGNOSIS — Z98.890 OTHER SPECIFIED POSTPROCEDURAL STATES: Chronic | ICD-10-CM

## 2019-05-20 DIAGNOSIS — Z00.00 ENCOUNTER FOR GENERAL ADULT MEDICAL EXAMINATION WITHOUT ABNORMAL FINDINGS: ICD-10-CM

## 2019-05-20 DIAGNOSIS — Z90.49 ACQUIRED ABSENCE OF OTHER SPECIFIED PARTS OF DIGESTIVE TRACT: Chronic | ICD-10-CM

## 2019-05-20 PROCEDURE — 99213 OFFICE O/P EST LOW 20 MIN: CPT

## 2019-05-20 PROCEDURE — G0463: CPT

## 2019-05-24 DIAGNOSIS — Z59.0 HOMELESSNESS: ICD-10-CM

## 2019-05-24 DIAGNOSIS — I89.0 LYMPHEDEMA, NOT ELSEWHERE CLASSIFIED: ICD-10-CM

## 2019-05-24 DIAGNOSIS — I87.303 CHRONIC VENOUS HYPERTENSION (IDIOPATHIC) WITHOUT COMPLICATIONS OF BILATERAL LOWER EXTREMITY: ICD-10-CM

## 2019-05-24 SDOH — ECONOMIC STABILITY - HOUSING INSECURITY: HOMELESSNESS: Z59.0

## 2019-07-08 ENCOUNTER — APPOINTMENT (OUTPATIENT)
Dept: SURGERY | Facility: CLINIC | Age: 69
End: 2019-07-08
Payer: MEDICARE

## 2019-07-08 ENCOUNTER — OUTPATIENT (OUTPATIENT)
Dept: OUTPATIENT SERVICES | Facility: HOSPITAL | Age: 69
LOS: 1 days | End: 2019-07-08
Payer: MEDICARE

## 2019-07-08 VITALS
RESPIRATION RATE: 18 BRPM | TEMPERATURE: 97.1 F | BODY MASS INDEX: 21.56 KG/M2 | OXYGEN SATURATION: 98 % | HEIGHT: 71 IN | SYSTOLIC BLOOD PRESSURE: 147 MMHG | WEIGHT: 154 LBS | DIASTOLIC BLOOD PRESSURE: 77 MMHG | HEART RATE: 65 BPM

## 2019-07-08 DIAGNOSIS — Z98.890 OTHER SPECIFIED POSTPROCEDURAL STATES: Chronic | ICD-10-CM

## 2019-07-08 DIAGNOSIS — Z00.00 ENCOUNTER FOR GENERAL ADULT MEDICAL EXAMINATION WITHOUT ABNORMAL FINDINGS: ICD-10-CM

## 2019-07-08 DIAGNOSIS — Z90.49 ACQUIRED ABSENCE OF OTHER SPECIFIED PARTS OF DIGESTIVE TRACT: Chronic | ICD-10-CM

## 2019-07-08 PROCEDURE — G0463: CPT

## 2019-07-08 PROCEDURE — 99213 OFFICE O/P EST LOW 20 MIN: CPT

## 2019-07-08 NOTE — ASSESSMENT
[FreeTextEntry1] : 69 year old male being treated  for lymphedema , using 2 layer socks, much better\par no clinical signs of infection; \par received CircAid and lymphedema pump- likes maynor/lauryn better\par primary dressing telfa\par   stocking compression \par going to see podiatry\par \par Dequan lopez is the  [phone]; \par 12/22/17- overall improvement of both legs is related to availability  of a residence in [facility]\par  He is living now in [place] [address] [address] [facility] [address] [facility] [address]\par moved from  [address] [place] [address]\par rm 419\par resent to  for stockings\par pt will follow up in 1-3 months\par \par

## 2019-07-08 NOTE — HISTORY OF PRESENT ILLNESS
[FreeTextEntry1] : 69 year old male being treated for right leg cluster ulcers.now closed, recently last week trying to put on stocking, severe ankle pain, can only wear old ones or ace wrap\par no fever, no fall\par  never got new stocking  , nude ones better  \par  no cellulitis, needs to see podiatry for toe nail fungus \par   using pump, much better still homeless can not use pump regularly\par pt had massive swelling of the right leg with a case of lymphedema.long standing\par  pt has no clinical signs of infection, no fever\par   \par Patient in shelter and was homeless for 6 years.  Patient receiving social security.  \par has lymphedema pump, currently inoperative\par

## 2019-07-08 NOTE — HISTORY OF PRESENT ILLNESS
[FreeTextEntry1] : 68year old male being treated for right leg cluster ulcers.now closed, no ccellulitis, needs to see podiatry for toe nail fungus,needs new pair of socks for both legs\par   using pump, much better\par pt had massive swelling of the right leg with a case of lymphedema.long standing\par  pt has no clinical signs of infection, no fever\par   \par Patient in shelter and was homeless for 6 years.  Patient receiving social security.  \par has lymphedema pump, currently inoperative\par

## 2019-07-08 NOTE — ASSESSMENT
[FreeTextEntry1] : 68 year old male being treated  for lymphedema , using 2 layer socks, much better\par no clinical signs of infection; \par received CircAid and lymphedema pump- likes maynor/lauryn better\par primary dressing telfa\par   4x4 gauze and stocking compression \par  \par Dequan john is the  [phone]; \par 12/22/17- overall improvement of both legs is related to availability  of a residence in [facility]\par  He is living  [address] [place] [address]\par [address]\par \par pt will follow up in 2-3months\par \par

## 2019-07-08 NOTE — DATA REVIEWED
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

## 2019-07-08 NOTE — PHYSICAL EXAM
[Normal Breath Sounds] : Normal breath sounds [Normal Rate and Rhythm] : normal rate and rhythm [Ankle Swelling (On Exam)] : present [] : bilaterally [Ankle Swelling Bilaterally] : severe [Alert] : alert [Oriented to Person] : oriented to person [Oriented to Place] : oriented to place [Oriented to Time] : oriented to time [Calm] : calm [JVD] : no jugular venous distention  [Abdomen Tenderness] : ~T ~M No abdominal tenderness [de-identified] : nad, ambulatory, overweight [de-identified] : no wheezing [de-identified] : ambulatory [FreeTextEntry1] : medial leg [FreeTextEntry2] : .1 [FreeTextEntry4] : .1 [FreeTextEntry3] : .1 [de-identified] :  sock [de-identified] : telfa [de-identified] : \par 2/25/19  ankle25 /41.5\par 42.5cm 1/14/19, 25.5cm\par \par previous calf  41.5  ankle 28\par  [FreeTextEntry7] : right lower leg [de-identified] : .1 [FreeTextEntry8] : .1 [FreeTextEntry9] : .1 [de-identified] : 2/25/19  ankle 27.5 calf 40.5\par \par 1/14/19 41.5cm  calf, 25.5\par  gsv closed off \par right 29/ 40.5- length 44\par ablation on right

## 2019-07-08 NOTE — PHYSICAL EXAM
[Normal Breath Sounds] : Normal breath sounds [Normal Rate and Rhythm] : normal rate and rhythm [Ankle Swelling (On Exam)] : present [] : bilaterally [Alert] : alert [Ankle Swelling Bilaterally] : severe [Oriented to Time] : oriented to time [Oriented to Place] : oriented to place [Oriented to Person] : oriented to person [Calm] : calm [JVD] : no jugular venous distention  [Abdomen Tenderness] : ~T ~M No abdominal tenderness [de-identified] : nad, ambulatory, overweight/ left eye 20/20, wears glasses [de-identified] : no wheezing [de-identified] : ambulatory [FreeTextEntry1] : medial leg [de-identified] :  sock [de-identified] : 7/8  left 29.5 and 41\par \par \par 4 2/25/19  ankle25 /41.5 42.5cm 1/14/19, 25.5cm  previous calf  41.5  ankle 28  [de-identified] : telfa [FreeTextEntry7] : right lower leg [FreeTextEntry8] : .1 [de-identified] : .1 [FreeTextEntry9] : .1 [de-identified] : 7/8 right leg 30 ankle 44 calf,\par discoloration dorsal mid foot 3x3cm ? old hematoma or venous stasis not present on left\par \par \par \par 26.5 4/8, 40.5\par 2/25/19  ankle 27.5 calf 40.5\par \par 1/14/19 41.5cm  calf, 25.5\par  gsv closed off \par right 29/ 40.5- length 44\par ablation on right

## 2019-07-08 NOTE — PHYSICAL EXAM
[Normal Breath Sounds] : Normal breath sounds [Normal Rate and Rhythm] : normal rate and rhythm [Ankle Swelling (On Exam)] : present [] : bilaterally [Ankle Swelling Bilaterally] : severe [Alert] : alert [Oriented to Person] : oriented to person [Oriented to Place] : oriented to place [Oriented to Time] : oriented to time [Calm] : calm [JVD] : no jugular venous distention  [Abdomen Tenderness] : ~T ~M No abdominal tenderness [de-identified] : nad, ambulatory, overweight/ left eye 20/20, wears glasses [de-identified] : no wheezing [de-identified] : ambulatory [FreeTextEntry1] : medial leg [FreeTextEntry2] : .1 [de-identified] :  sock [FreeTextEntry3] : .1 [FreeTextEntry4] : .1 [FreeTextEntry7] : right lower leg [de-identified] : 4 2/25/19  ankle25 /41.5 42.5cm 1/14/19, 25.5cm  previous calf  41.5  ankle 28  [de-identified] : telfa [de-identified] : .1 [FreeTextEntry9] : .1 [FreeTextEntry8] : .1 [de-identified] : 26.5 4/8, 40.5\par 2/25/19  ankle 27.5 calf 40.5\par \par 1/14/19 41.5cm  calf, 25.5\par  gsv closed off \par right 29/ 40.5- length 44\par ablation on right

## 2019-07-08 NOTE — ASSESSMENT
[FreeTextEntry1] : 69 year old male being treated  for lymphedema , new right ankle pain\par s/p ablation vv surgery on right leg, r/o new phlebitis\par  using 2 layer socks  on left , ace on right\par no clinical signs of infection; \par consider us venous and xray of ankle r/o stress fracture/sprain\par \par received CircAid and lymphedema pump- likes juzo/lauryn better\par asa 650 for pain prn\par  ace/ stocking compression / pump\par going to see podiatry\par \par  \par Dequna lopez is the  [phone]; \par 12/22/17- overall improvement of both legs is related to availability  of a residence in [facility]\par  He is living now in [facility] [address] [address]\par moved from  [address] [place] [address]\par rm 419\par \par pt will follow up in 1 week\par \par

## 2019-07-08 NOTE — DATA REVIEWED
[FreeTextEntry1] : [id number]\par tartrate- eye drops  left eye- for glaucoma, cataracts  left s/p cataract removal , lost peripheral vision left eye

## 2019-07-08 NOTE — HISTORY OF PRESENT ILLNESS
[FreeTextEntry1] : 69 year old male being treated for right leg cluster ulcers.now closed,\par  never got new stocking  , nude ones better than clack one\par  no ccellulitis, needs to see podiatry for toe nail fungus,needs new pair of socks for both legs\par   using pump, much better still homeless can not use pump regularly\par pt had massive swelling of the right leg with a case of lymphedema.long standing\par  pt has no clinical signs of infection, no fever\par   \par Patient in shelter and was homeless for 6 years.  Patient receiving social security.  \par has lymphedema pump, currently inoperative\par

## 2019-07-09 DIAGNOSIS — M25.571 PAIN IN RIGHT ANKLE AND JOINTS OF RIGHT FOOT: ICD-10-CM

## 2019-07-09 DIAGNOSIS — I87.303 CHRONIC VENOUS HYPERTENSION (IDIOPATHIC) WITHOUT COMPLICATIONS OF BILATERAL LOWER EXTREMITY: ICD-10-CM

## 2019-07-22 ENCOUNTER — APPOINTMENT (OUTPATIENT)
Dept: ULTRASOUND IMAGING | Facility: HOSPITAL | Age: 69
End: 2019-07-22
Payer: MEDICARE

## 2019-07-22 ENCOUNTER — OUTPATIENT (OUTPATIENT)
Dept: OUTPATIENT SERVICES | Facility: HOSPITAL | Age: 69
LOS: 1 days | End: 2019-07-22
Payer: MEDICARE

## 2019-07-22 DIAGNOSIS — Z90.49 ACQUIRED ABSENCE OF OTHER SPECIFIED PARTS OF DIGESTIVE TRACT: Chronic | ICD-10-CM

## 2019-07-22 DIAGNOSIS — Z98.890 OTHER SPECIFIED POSTPROCEDURAL STATES: Chronic | ICD-10-CM

## 2019-07-22 DIAGNOSIS — I87.309 CHRONIC VENOUS HYPERTENSION (IDIOPATHIC) WITHOUT COMPLICATIONS OF UNSPECIFIED LOWER EXTREMITY: ICD-10-CM

## 2019-07-22 DIAGNOSIS — M25.571 PAIN IN RIGHT ANKLE AND JOINTS OF RIGHT FOOT: ICD-10-CM

## 2019-07-22 PROCEDURE — 93971 EXTREMITY STUDY: CPT

## 2019-07-22 PROCEDURE — 73610 X-RAY EXAM OF ANKLE: CPT

## 2019-07-22 PROCEDURE — 73610 X-RAY EXAM OF ANKLE: CPT | Mod: 26,RT

## 2019-07-22 PROCEDURE — 93971 EXTREMITY STUDY: CPT | Mod: 26,RT

## 2019-08-22 NOTE — DISCHARGE NOTE ADULT - VISION (WITH CORRECTIVE LENSES IF THE PATIENT USUALLY WEARS THEM):
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]    Procedure(s) (LRB):  ABLATION MICROWAVE; INTRAOPERATIVE ULTRASOUND OF THE LIVER (N/A)  HEMICOLECTOMY (Right)  CHOLECYSTECTOMY (N/A)  LAPAROTOMY EXPLORATORY (N/A)    Specimen(s):  ID Type Source Tests Collected by Time Destination   1 : Right Hemicolectomy Tissue Large Intestine, Right/Ascending Colon TISSUE EXAM Ronaldo Resendiz MD 2019 1249    2 :  Tissue Gallbladder TISSUE EXAM Ronaldo Resendiz MD 2019 1329        Estimated Blood Loss:   400 mL    Drains:  Urethral Catheter Double-lumen 16 Fr  (Active)   Number of days: 0       [REMOVED] NG/OG/Enteral Tube Right mouth (Removed)   Number of days: 0       Anesthesia Type:   General w/ Epidural    Operative Indications:  Carcinoid tumor of ileum [D1 012]  17-year-old female with recently diagnosed metastatic neuroendocrine tumor  It was recommended that she undergo surgical resection of the primary and ablation of liver disease  Risks and benefits were explained  Informed consent was obtained  Patient was brought to the operating room  Operative Findings:    Abdominal disease appeared to be confined to the primary site in the terminal ileum and a mesenteric node  The liver disease appeared as 4 discrete nodules seen on endoscopic ultrasound  There were some vague hazy area seen on the ultrasound of the liver, but there were no discrete masses seen in  These regions  Complications:   None    Procedure and Technique:    After identifying the patient, general anesthesia was achieved  A Yeager catheter was placed  Lines were placed by Anesthesia    Patient was prepped and draped in the usual sterile fashion  A time-out was performed  Midline incision was made  Using sharp dissection this taken through the skin, subcutaneous tissue and through the fascia into the peritoneal cavity  Bookwalter retractor was placed  There was no evidence of carcinomatosis  The omentum appeared normal   The only nodularity seen was the mesenteric lymph node  The pelvis was normal as well  We initially started with the right hemicolectomy  The right colon was mobilized along old white line of Toldt  We now mobilized the hepatic flexure and took the colon off the kidney  The ureter was visualized and preserved  We took this to the level of the duodenum an elevated the colon from the duodenum  We got into the lesser sac and divided the omentum off of the colon so the middle colic vessels were exposed  At this point the tumor was just proximal to the ileocecal valve  It was felt that a right hemicolectomy would be required to adequately remove the tumor as well as the draining enlarged mesenteric lymph node  Area suitable for division of the colon and small bowel were identified and these were divided with a HEATHER 75 stapling device  The colon was divided incorporating the right branch of the middle colic artery  The mesentery was clamped, divided and ligated down to its root at the level of the duodenum  Specimen was handed off the table  Wound was now irrigated and there was excellent hemostasis  The small bowel and colon were brought side to side using interrupted 3 0 silk suture  The distal portion of each staple line were removed  A HEATHER 75 stapling device was placed in each limb and fired  The defect was closed with a TA 60 stapling device  We now started to close the mesenteric defect with 3 0 Vicryl suture  Once we did this blood started to well up from down near the root of the mesentery  The 3 0 Vicryl was removed   We were ultimately able to identify 1 bleeding site and this  Was oversewn with 3 0 Prolene suture  At this point there was some venous oozing  FloSeal was placed in this area and the area was packed  Ultimately there was excellent hemostasis  The bowel appeared completely viable  At this time we felt that we would just leave the mesenteric defect open  We now turned our attention to the cholecystectomy  The incision was enlarged superiorly  This was taken through the skin through the fascia  The Bookwalter retractor was repositioned  The gallbladder was taken in a dome down fashion off the liver  This was done using the Bovie electrocautery  Cystic artery was cauterized  Cystic duct was clamped, divided and suture ligated with 3 0 Vicryl suture  Specimen was handed table  At this point we now proceeded with our liver directed therapy  The falciform ligament was clamped, divided and ligated with 2 0 silk suture  The liver was now able to be rotated medially  There was some trauma to the liver from 1 of the retractors and there was excellent hemostasis achieved with direct pressure  Intraoperative ultrasound was performed and the 4  lesions seen on the preoperative imaging were easily visualized as nodules  There was no evidence of any other nodularity seen with intraoperative ultrasound  There were several vague areas of haziness, but no masses were associated with these  Consequently we elected to observe these areas and just ablate masses that were visualized  We started with the largest mass that was 2 cm in size in the medial right lobe  This was not palpable  Using ultrasound guidance the probe was placed into this area and using 100 w of microwave energy for 4 minutes, the lesion was ablated  We visualized the ablation zone starting in the middle of the tumor  The lesion appeared to be completely ablated  We now took care of at our subcentimeter lesion in right lobe at approximately segment 7  This was ablated with 61 w of energy for 2 minutes    When we visualized this after the ablation, it did appear as if the more lateral aspect of the 1st lesion was not completely ablated despite the needle being appropriately deployed  Consequently under ultrasound guidance we placed microwave needle in this area and once again ablated this area for 4 minutes using 100 w of microwave energy  Now the area appeared completely ablated with intraoperative ultrasound  Turned our attention to the other segment 6/7 lesion  This was now ablated with 61 w of a microwave energy for 2 minutes  This was completely ablated and confirmed with the intraoperative ultrasound  Segment 6 lesion was now visualized and using 60 w of microwave energy for 2 minutes this was completely ablated and confirmed with ultrasound guidance  At this point, the probes were removed  All the lesions had been completely ablated  There was excellent hemostasis  The wound was now copiously irrigated and there was excellent hemostasis  There was no evidence of bile leak or bleeding  FloSeal was placed at the site of the liver trauma from the retractor  There was excellent hemostasis  We now copiously irrigated the remaining part of the abdomen near the colectomy  There was no evidence of bleeding where we had placed a FloSeal   The Bookwalter retractor was removed  Sponge and needle counts were correct  The fascia was approximated with 1  PDS suture starting at either end of the incision secured centrally  Subcutaneous tissue was irrigated  Skin was approximated with staples  Sterile dressings were applied  Patient was extubated having tolerated the procedure well and taken to the recovery room in stable condition  I was present and participated in all aspects of the procedure         I was present for the entire procedure    Patient Disposition:  PACU     SIGNATURE: Marco Kline MD  DATE: August 22, 2019  TIME: 3:31 PM Partially impaired: cannot see medication labels or newsprint, but can see obstacles in path, and the surrounding layout; can count fingers at arm's length/wears glasses, has glaucoma, cataract surgery,

## 2019-08-26 ENCOUNTER — OUTPATIENT (OUTPATIENT)
Dept: OUTPATIENT SERVICES | Facility: HOSPITAL | Age: 69
LOS: 1 days | End: 2019-08-26
Payer: MEDICARE

## 2019-08-26 ENCOUNTER — APPOINTMENT (OUTPATIENT)
Dept: SURGERY | Facility: CLINIC | Age: 69
End: 2019-08-26
Payer: MEDICARE

## 2019-08-26 VITALS
WEIGHT: 258 LBS | DIASTOLIC BLOOD PRESSURE: 76 MMHG | HEART RATE: 69 BPM | BODY MASS INDEX: 36.12 KG/M2 | SYSTOLIC BLOOD PRESSURE: 144 MMHG | HEIGHT: 71 IN | TEMPERATURE: 97.6 F

## 2019-08-26 DIAGNOSIS — M25.571 PAIN IN RIGHT ANKLE AND JOINTS OF RIGHT FOOT: ICD-10-CM

## 2019-08-26 DIAGNOSIS — Z00.00 ENCOUNTER FOR GENERAL ADULT MEDICAL EXAMINATION WITHOUT ABNORMAL FINDINGS: ICD-10-CM

## 2019-08-26 DIAGNOSIS — Z90.49 ACQUIRED ABSENCE OF OTHER SPECIFIED PARTS OF DIGESTIVE TRACT: Chronic | ICD-10-CM

## 2019-08-26 DIAGNOSIS — Z98.890 OTHER SPECIFIED POSTPROCEDURAL STATES: Chronic | ICD-10-CM

## 2019-08-26 PROCEDURE — 99213 OFFICE O/P EST LOW 20 MIN: CPT

## 2019-08-26 PROCEDURE — G0463: CPT

## 2019-08-26 NOTE — PHYSICAL EXAM
[JVD] : no jugular venous distention  [Normal Rate and Rhythm] : normal rate and rhythm [Normal Breath Sounds] : Normal breath sounds [0] : right 0 [Ankle Swelling (On Exam)] : present [1+] : left 1+ [] : bilaterally [Ankle Swelling Bilaterally] : severe [Abdomen Tenderness] : ~T ~M No abdominal tenderness [Alert] : alert [Oriented to Person] : oriented to person [Oriented to Place] : oriented to place [Oriented to Time] : oriented to time [Calm] : calm [de-identified] : nad, ambulatory, overweight/ left eye 20/20, wears glasses [de-identified] : ambulatory [de-identified] : no wheezing [de-identified] :  sock [FreeTextEntry1] : medial leg [de-identified] : 8/26/19 left 29.5/ 42cm\par \par \par 7/8  left 29.5 and 41\par \par \par 4 2/25/19  ankle25 /41.5 42.5cm 1/14/19, 25.5cm  previous calf  41.5  ankle 28  [de-identified] : telfa [FreeTextEntry7] : right lower leg [FreeTextEntry9] : 0 [FreeTextEntry8] : 0 [de-identified] : 0 [de-identified] : none right foot puffy [TWNoteComboBox1] : Left [de-identified] : 8/26/19 30cm/43 calf\par \par 7/8 right leg 30 ankle 44 calf,\par discoloration dorsal mid foot 3x3cm ? old hematoma or venous stasis not present on left\par \par \par \par 26.5 4/8, 40.5\par 2/25/19  ankle 27.5 calf 40.5\par \par 1/14/19 41.5cm  calf, 25.5\par  gsv closed off \par right 29/ 40.5- length 44\par ablation on right [TWNoteComboBox5] : No [de-identified] : No [TWNoteComboBox6] : Venous [de-identified] : Normal [de-identified] : No

## 2019-08-26 NOTE — DATA REVIEWED
[FreeTextEntry1] : [id number]\par tartrate- eye drops  left eye- for glaucoma, cataracts  left s/p cataract removal , lost peripheral vision left eye

## 2019-08-26 NOTE — HISTORY OF PRESENT ILLNESS
[FreeTextEntry1] : 69 year old male being treated for right leg cluster ulcers.now closed, had repeat doppler, negtive for venous thrombosis\par ankle r no fracture\par  recently trying to put on stocking, severe ankle pain, - burning\par can only wear old ones or ace wrap- got socks has not tried towear them \par no fever, no fall,  right ankle pain better than previous\par s/p ablation vv surgery on right leg, r/o new phlebitis\par  using 2 layer socks  on left , ace on right\par no clinical signs of infection; \par consider us venous and xray of ankle r/o stress fracture/sprai\par  never got new stocking  , nude ones better  \par  no cellulitis, needs to see podiatry for toe nail fungus \par   using pump, much better still homeless can not use pump regularly\par pt had massive swelling of the right leg with a case of lymphedema.long standing\par  pt has no clinical signs of infection, no fever\par   \par Patient in shelter and was homeless for 6 years.  Patient receiving social security.  \par has lymphedema pump, currently inoperative\par

## 2019-08-26 NOTE — ASSESSMENT
[FreeTextEntry1] : 69 year old male being treated  for lymphedema , new right ankle pain no fractures or dvt\par s/p ablation vv surgery on right leg, r/o new phlebitis- no comment on valves, with calcification of vessels on plain film\par check arter dopplers \par  using tubi, try duoderm at ankle crease, burning may be due to friction\par  2 layer socks/ no clinical signs of infection; \par \par received CircAid and lymphedema pump- likes maynor/lauryn better\par asa 650 for pain prn\par    compression / pump\par going to see podiatry\par \par  \par Dequan lopez is the  [phone]; \par 12/22/17- overall improvement of both legs is related to availability  of a residence in [facility]\par  He is living now in [facility] [address] [facility] [address] [facility] [id number]\par moved from  [address] [place] [address]\par rm 419\par \par pt will follow up in 1 week\par \par

## 2019-08-28 DIAGNOSIS — M25.571 PAIN IN RIGHT ANKLE AND JOINTS OF RIGHT FOOT: ICD-10-CM

## 2019-08-28 DIAGNOSIS — I87.309 CHRONIC VENOUS HYPERTENSION (IDIOPATHIC) WITHOUT COMPLICATIONS OF UNSPECIFIED LOWER EXTREMITY: ICD-10-CM

## 2019-08-28 DIAGNOSIS — I89.0 LYMPHEDEMA, NOT ELSEWHERE CLASSIFIED: ICD-10-CM

## 2019-09-11 ENCOUNTER — OTHER (OUTPATIENT)
Age: 69
End: 2019-09-11

## 2019-09-26 ENCOUNTER — APPOINTMENT (OUTPATIENT)
Dept: ULTRASOUND IMAGING | Facility: HOSPITAL | Age: 69
End: 2019-09-26

## 2019-10-09 ENCOUNTER — OUTPATIENT (OUTPATIENT)
Dept: OUTPATIENT SERVICES | Facility: HOSPITAL | Age: 69
LOS: 1 days | End: 2019-10-09
Payer: MEDICARE

## 2019-10-09 ENCOUNTER — APPOINTMENT (OUTPATIENT)
Dept: ULTRASOUND IMAGING | Facility: HOSPITAL | Age: 69
End: 2019-10-09
Payer: MEDICARE

## 2019-10-09 DIAGNOSIS — Z90.49 ACQUIRED ABSENCE OF OTHER SPECIFIED PARTS OF DIGESTIVE TRACT: Chronic | ICD-10-CM

## 2019-10-09 DIAGNOSIS — I87.2 VENOUS INSUFFICIENCY (CHRONIC) (PERIPHERAL): ICD-10-CM

## 2019-10-09 DIAGNOSIS — Z98.890 OTHER SPECIFIED POSTPROCEDURAL STATES: Chronic | ICD-10-CM

## 2019-10-09 DIAGNOSIS — M25.571 PAIN IN RIGHT ANKLE AND JOINTS OF RIGHT FOOT: ICD-10-CM

## 2019-10-09 PROCEDURE — 93970 EXTREMITY STUDY: CPT

## 2019-10-09 PROCEDURE — 93925 LOWER EXTREMITY STUDY: CPT

## 2019-10-09 PROCEDURE — 93925 LOWER EXTREMITY STUDY: CPT | Mod: 26

## 2019-10-09 PROCEDURE — 93970 EXTREMITY STUDY: CPT | Mod: 26

## 2019-10-28 ENCOUNTER — APPOINTMENT (OUTPATIENT)
Dept: SURGERY | Facility: CLINIC | Age: 69
End: 2019-10-28
Payer: MEDICARE

## 2019-10-28 ENCOUNTER — OUTPATIENT (OUTPATIENT)
Dept: OUTPATIENT SERVICES | Facility: HOSPITAL | Age: 69
LOS: 1 days | End: 2019-10-28
Payer: MEDICARE

## 2019-10-28 VITALS
HEIGHT: 71 IN | WEIGHT: 267 LBS | DIASTOLIC BLOOD PRESSURE: 90 MMHG | TEMPERATURE: 98.2 F | BODY MASS INDEX: 37.38 KG/M2 | OXYGEN SATURATION: 100 % | SYSTOLIC BLOOD PRESSURE: 161 MMHG | RESPIRATION RATE: 18 BRPM | HEART RATE: 66 BPM

## 2019-10-28 DIAGNOSIS — Z98.890 OTHER SPECIFIED POSTPROCEDURAL STATES: Chronic | ICD-10-CM

## 2019-10-28 DIAGNOSIS — Z90.49 ACQUIRED ABSENCE OF OTHER SPECIFIED PARTS OF DIGESTIVE TRACT: Chronic | ICD-10-CM

## 2019-10-28 DIAGNOSIS — Z00.00 ENCOUNTER FOR GENERAL ADULT MEDICAL EXAMINATION WITHOUT ABNORMAL FINDINGS: ICD-10-CM

## 2019-10-28 DIAGNOSIS — I89.0 LYMPHEDEMA, NOT ELSEWHERE CLASSIFIED: ICD-10-CM

## 2019-10-28 PROCEDURE — 99213 OFFICE O/P EST LOW 20 MIN: CPT | Mod: 25

## 2019-10-28 PROCEDURE — 29580 STRAPPING UNNA BOOT: CPT | Mod: RT

## 2019-10-28 PROCEDURE — G0463: CPT

## 2019-10-28 PROCEDURE — 29580 STRAPPING UNNA BOOT: CPT

## 2019-10-28 NOTE — DATA REVIEWED
[FreeTextEntry1] : [id number]\par tartrate- eye drops  left eye- for glaucoma, cataracts  left s/p cataract removal , lost peripheral vision left eye

## 2019-10-28 NOTE — ASSESSMENT
[FreeTextEntry1] : 69 year old male being treated  for lymphedema , new right ankle pain no fractures or dvt\par recurrent valvular insufficincy\par recommend ablation with dr. Guzman\par \par s/p ablation vv surgery on right leg, no new phlebitis- eva normal with calcification of vessels on plain film\par  \par  using tubi  unna applied to right leg, coban to left\par  try to reapply 2 layer socks later this week/ no clinical signs of infection; \par \par received CircAid and lymphedema pump- likes juzo/lauryn better\par asa 650 for pain prn/   compression / pump\par going to see podiatry\par \par  \par Dequan john is the  [phone]; \par 12/22/17- overall improvement of both legs is related to availability  of a residence in [facility]\par  He is living now in [facility] [address] [facility] [address] [place] [phone]\par moved from  [address] [place] [address]\par rm 419\par \par pt will follow up  prn\par \par

## 2019-10-28 NOTE — PHYSICAL EXAM
[JVD] : no jugular venous distention  [Normal Breath Sounds] : Normal breath sounds [Normal Rate and Rhythm] : normal rate and rhythm [0] : right 0 [1+] : left 1+ [Ankle Swelling (On Exam)] : present [] : bilaterally [Ankle Swelling Bilaterally] : severe [Abdomen Tenderness] : ~T ~M No abdominal tenderness [Alert] : alert [Oriented to Person] : oriented to person [Oriented to Place] : oriented to place [Oriented to Time] : oriented to time [Calm] : calm [de-identified] : nad, ambulatory, overweight/ left eye 20/20, wears glasses [de-identified] : no wheezing [de-identified] : ambulatory [FreeTextEntry1] : medial leg [de-identified] :  sock [de-identified] : telfa [de-identified] : left 43/ 32\par coban wrap placed\par \par 8/26/19 left 29.5/ 42cm\par \par \par 7/8  left 29.5 and 41\par \par \par 4 2/25/19  ankle25 /41.5 42.5cm 1/14/19, 25.5cm  previous calf  41.5  ankle 28  [FreeTextEntry7] : right lower leg [FreeTextEntry8] : 0 [FreeTextEntry9] : 0 [de-identified] : 0 [de-identified] : none right foot puffy [de-identified] : 10/28 32/47\par unna placed\par \par 8/26/19 30cm/43 calf\par \par 7/8 right leg 30 ankle 44 calf,\par discoloration dorsal mid foot 3x3cm ? old hematoma or venous stasis not present on left\par \par \par \par 26.5 4/8, 40.5\par 2/25/19  ankle 27.5 calf 40.5\par \par 1/14/19 41.5cm  calf, 25.5\par  gsv closed off \par right 29/ 40.5- length 44\par ablation on right [TWNoteComboBox1] : Left [TWNoteComboBox5] : No [TWNoteComboBox6] : Venous [de-identified] : No [de-identified] : Normal [de-identified] : No

## 2019-10-28 NOTE — HISTORY OF PRESENT ILLNESS
[FreeTextEntry1] : 69 year old male being treated for right leg cluster ulcers.now closed, \par had repeat doppler, negtive for venous thrombosis- both legs with gsv insufficincy\par eva 1 bilat\par \par ankle r no fracture\par  NOT trying to put on stocking, severe ankle pain, - burning- worried it would hurt him\par did not use pump either\par no new drainage\par can only wear old ones or ace wrap- got socks has not tried towear them \par no fever, no fall,  right ankle pain better than previous\par s/p ablation vv surgery on right leg, r/o new phlebitis\par  using 2 layer socks  on left , ace on right\par no clinical signs of infection; \par consider us venous and xray of ankle r/o stress fracture/sprai\par  never got new stocking  , nude ones better  \par  no cellulitis, needs to see podiatry for toe nail fungus \par   using pump, much better still homeless can not use pump regularly\par pt had massive swelling of the right leg with a case of lymphedema.long standing\par  pt has no clinical signs of infection, no fever\par   \par Patient in shelter and was homeless for 6 years.  Patient receiving social security.  \par has lymphedema pump, currently inoperative\par

## 2019-10-29 DIAGNOSIS — I89.0 LYMPHEDEMA, NOT ELSEWHERE CLASSIFIED: ICD-10-CM

## 2019-10-29 DIAGNOSIS — Z78.9 OTHER SPECIFIED HEALTH STATUS: ICD-10-CM

## 2019-10-29 DIAGNOSIS — I87.309 CHRONIC VENOUS HYPERTENSION (IDIOPATHIC) WITHOUT COMPLICATIONS OF UNSPECIFIED LOWER EXTREMITY: ICD-10-CM

## 2019-11-25 ENCOUNTER — APPOINTMENT (OUTPATIENT)
Dept: WOUND CARE | Facility: CLINIC | Age: 69
End: 2019-11-25
Payer: MEDICARE

## 2019-11-25 VITALS — BODY MASS INDEX: 37.38 KG/M2 | WEIGHT: 267 LBS | HEIGHT: 71 IN

## 2019-11-25 PROCEDURE — 29581 APPL MULTLAYER CMPRN SYS LEG: CPT | Mod: 50

## 2019-11-27 NOTE — ASSESSMENT
[FreeTextEntry1] : 69 year old male being treated  for lymphedema , new right ankle pain no fractures or dvt\par recurrent valvular insufficincy\par recommend ablation with dr. Guzman\par \par s/p ablation vv surgery on right leg, no new phlebitis- eva normal with calcification of vessels on plain film\par  \par  using tubi  unna applied to right leg, coban to left\par  try to reapply 2 layer socks later this week/ no clinical signs of infection; \par \par received CircAid and lymphedema pump- likes juzo/lauryn better\par asa 650 for pain prn/   compression / pump\par going to see podiatry\par \par  \par Dequan john is the  [phone]; \par 12/22/17- overall improvement of both legs is related to availability  of a residence in [facility]\par  He is living now in [facility] [address] [facility] [address] [place] [address]\par moved from  [address] [place] [address]\par rm 419\par \par pt will follow up  prn\par \par

## 2019-11-27 NOTE — PHYSICAL EXAM
[Normal Breath Sounds] : Normal breath sounds [0] : right 0 [Normal Rate and Rhythm] : normal rate and rhythm [1+] : right 1+ [Ankle Swelling (On Exam)] : present [] : bilaterally [Ankle Swelling Bilaterally] : severe [Oriented to Person] : oriented to person [Alert] : alert [Oriented to Place] : oriented to place [Oriented to Time] : oriented to time [Calm] : calm [JVD] : no jugular venous distention  [Abdomen Tenderness] : ~T ~M No abdominal tenderness [de-identified] : nad, ambulatory, overweight/ left eye 20/20, wears glasses [de-identified] : no wheezing [de-identified] : ambulatory [FreeTextEntry1] : medial leg [FreeTextEntry2] : 0 [FreeTextEntry3] : 0 [FreeTextEntry4] : 0 [de-identified] :  sock [de-identified] : telfa [de-identified] : left 43/ 32\par coban wrap placed\par \par 8/26/19 left 29.5/ 42cm\par \par \par 7/8  left 29.5 and 41\par \par \par 4 2/25/19  ankle25 /41.5 42.5cm 1/14/19, 25.5cm  previous calf  41.5  ankle 28  [FreeTextEntry7] : right lower leg [FreeTextEntry8] : 0 [FreeTextEntry9] : 0 [de-identified] : 0 [de-identified] : none right foot puffy [de-identified] : 10/28 32/47\par unna placed\par \par 8/26/19 30cm/43 calf\par \par 7/8 right leg 30 ankle 44 calf,\par discoloration dorsal mid foot 3x3cm ? old hematoma or venous stasis not present on left\par \par \par \par 26.5 4/8, 40.5\par 2/25/19  ankle 27.5 calf 40.5\par \par 1/14/19 41.5cm  calf, 25.5\par  gsv closed off \par right 29/ 40.5- length 44\par ablation on right [TWNoteComboBox1] : Left [TWNoteComboBox5] : No [TWNoteComboBox6] : Venous [de-identified] : No [de-identified] : Normal [de-identified] : No [de-identified] : Multilayer Dynaflex Compression [TWNoteComboBox9] : Right [de-identified] : Multilayer Dynaflex Compression

## 2019-11-27 NOTE — REVIEW OF SYSTEMS
[Anxiety] : anxiety [Negative] : Heme/Lymph [FreeTextEntry2] : homeless  Consent (Nose)/Introductory Paragraph: The rationale for Mohs was explained to the patient and consent was obtained. The risks, benefits and alternatives to therapy were discussed in detail. Specifically, the risks of nasal deformity, changes in the flow of air through the nose, infection, scarring, bleeding, prolonged wound healing, incomplete removal, allergy to anesthesia, nerve injury and recurrence were addressed. Prior to the procedure, the treatment site was clearly identified and confirmed by the patient. All components of Universal Protocol/PAUSE Rule completed.

## 2019-11-27 NOTE — DATA REVIEWED
[FreeTextEntry1] : [id number]\par tartrate- eye drops  left eye- for glaucoma, cataracts  left s/p cataract removal , lost peripheral vision left eye

## 2019-12-16 ENCOUNTER — APPOINTMENT (OUTPATIENT)
Dept: VASCULAR SURGERY | Facility: CLINIC | Age: 69
End: 2019-12-16
Payer: MEDICARE

## 2019-12-16 ENCOUNTER — APPOINTMENT (OUTPATIENT)
Dept: WOUND CARE | Facility: CLINIC | Age: 69
End: 2019-12-16
Payer: MEDICARE

## 2019-12-16 PROCEDURE — 93970 EXTREMITY STUDY: CPT

## 2019-12-16 PROCEDURE — 99213 OFFICE O/P EST LOW 20 MIN: CPT

## 2019-12-16 PROCEDURE — 93923 UPR/LXTR ART STDY 3+ LVLS: CPT

## 2019-12-22 NOTE — ASSESSMENT
[FreeTextEntry1] : \par Mr. ROGERS SILVA is a 69 year with persistent and worsening bilateral lower extremity venous insufficiency, CEAP classification C5 which is unresponsive to at least 3 months of compression stockings 20-30 mmHg, leg elevation, exercise.  Patient has complaints of worsening  leg discomfort with swelling, and painful varicosities.  The patient’s symptoms interfere with their ADL’s, such as walking. Patient has intact pulses in both legs without evidence of arterial insufficiency.  new right ankle pain no fractures or dvt\par recurrent valvular insufficiency\par \par Treatment is indicated not for cosmetic reasons but for symptomatic venous reflux disease with symptoms which is refractory to conservative therapy. Venous duplex study demonstrates bilateral  lower extremity venous insufficiency. The need for definitive effective treatment is based on severe, interfering and worsening reflux symptoms with evidence of venous insufficiency on venous ultrasound. \par \par Patient is a candidate for endovenous ablation treatment of the  left  GSV and bilateral  SSV. \par The risks and benefits of endovenous ablation treatment versus continued conservative management were discussed with the patient.  Patient chooses endovenous ablation treatments. Treatment plan to be scheduled. \par \par \par s/p ablation vv surgery on right leg, no new phlebitis- eva normal with calcification of vessels on plain film\par \par \par received CircAid and lymphedema pump- likes juzo/lauryn better\par asa 650 for pain prn/   compression / pump\par going to see podiatry\par \par  \par Dequan lopez is the  [phone]; \par 12/22/17- overall improvement of both legs is related to availability of a residence in [facility]\par  He is living now in [facility] [address] [facility] [address] [place] [address]\par moved from  [address] [place] [address]\par rm 419\par \par \par \par

## 2019-12-22 NOTE — DATA REVIEWED
[FreeTextEntry1] : [id number]\par tartrate- eye drops  left eye- for glaucoma, cataracts  left s/p cataract removal , lost peripheral vision left eye

## 2019-12-22 NOTE — PHYSICAL EXAM
[Normal Breath Sounds] : Normal breath sounds [Normal Rate and Rhythm] : normal rate and rhythm [0] : right 0 [1+] : left 1+ [Ankle Swelling (On Exam)] : present [] : bilaterally [Ankle Swelling Bilaterally] : severe [Alert] : alert [Oriented to Person] : oriented to person [Oriented to Place] : oriented to place [Oriented to Time] : oriented to time [Calm] : calm [JVD] : no jugular venous distention  [Abdomen Tenderness] : ~T ~M No abdominal tenderness [de-identified] : no wheezing [de-identified] : nad, ambulatory, overweight/ left eye 20/20, wears glasses [de-identified] : ambulatory [FreeTextEntry3] : 0 [FreeTextEntry2] : 0 [FreeTextEntry1] : medial leg [FreeTextEntry4] : 0 [de-identified] :  compression stocking [de-identified] : \par \par  [FreeTextEntry8] : 0 [FreeTextEntry7] : right lower leg [FreeTextEntry9] : 0 [de-identified] :  compression stocking [de-identified] : 0 [de-identified] : \par \par  [TWNoteComboBox1] : Left [TWNoteComboBox6] : Venous [TWNoteComboBox5] : No [de-identified] : Normal [de-identified] : No [de-identified] : No [TWNoteComboBox9] : Right [de-identified] : Other [de-identified] : Multilayer Dynaflex Compression

## 2019-12-22 NOTE — HISTORY OF PRESENT ILLNESS
[FreeTextEntry1] : Mr. ROGERS SILVA is a 69 year who presents for evaluation of bilateral leg pain for the past 5 months. \par Patient's leg discomfort is associated with  leg swelling, fatigue, heaviness, \par \par Patient's symptoms have persisted despite conservative management with leg elevation, exercise, attempted weight loss, , and compression stocking use for more than 3 months. \par \par Patient's risks factor for venous disease are  obesity, history of varicose veins, spider veins \par \par Previous treatment, vein procedures and vein surgeries include:wearing compression stockings for more than 3 months with little or no relief   s/p ablation vv surgery on right leg,  treated for right leg cluster ulcers.now closed with compression therapy\par ankle r no fracture\par   \par Patient in shelter and was homeless for 6 years.\par

## 2020-01-17 RX ORDER — SODIUM BICARBONATE 84 MG/ML
8.4 INJECTION, SOLUTION INTRAVENOUS
Qty: 1 | Refills: 0 | Status: ACTIVE | COMMUNITY
Start: 2020-01-17 | End: 1900-01-01

## 2020-01-17 RX ORDER — SODIUM CHLORIDE 9 G/ML
0.9 INJECTION, SOLUTION INTRAVENOUS
Qty: 1 | Refills: 0 | Status: ACTIVE | COMMUNITY
Start: 2020-01-17 | End: 1900-01-01

## 2020-01-17 RX ORDER — LIDOCAINE HYDROCHLORIDE 10 MG/ML
1 INJECTION, SOLUTION INFILTRATION; PERINEURAL
Qty: 1 | Refills: 0 | Status: ACTIVE | COMMUNITY
Start: 2020-01-17 | End: 1900-01-01

## 2020-01-21 ENCOUNTER — APPOINTMENT (OUTPATIENT)
Dept: VASCULAR SURGERY | Facility: CLINIC | Age: 70
End: 2020-01-21
Payer: MEDICARE

## 2020-01-21 PROCEDURE — 36475 ENDOVENOUS RF 1ST VEIN: CPT | Mod: LT

## 2020-01-22 NOTE — PROCEDURE
[FreeTextEntry1] : left GSV RFA [FreeTextEntry3] : Procedural safety checklist and time out completed:\par Confirmed patient identification (Patient Name, , and/or medical record number including when possible affirmation by patient or parent/family/other).\par Confirmed procedure with the patient. Consent present, accurate and signed. \par Confirmed special equipment and supplies are present.\par Sterility confirmed. Position verified. \par Site/ side is marked and visible and confirmed. \par Procedure confirmed by consent. Accurate consent including side and site.\par Review of medical records, including venous ultrasound, noting correct procedure including site and side.\par MD/PA verifies presence and review of imaging studies and or written report of imaging studies.\par Agreement on the procedure to be performed\par Time out completed.\par All of the above has been confirmed by the team.\par All patient-specific concerns have been addressed. \par \par Indication: Left lower extremity varicose veins with leg pain, leg swelling, and leg cramping.  Venous insufficiency/ reflux.\par \par Procedure: radiofrequency ablation of the left great saphenous vein. \par 	\par Mr. ROGERS SILVA is a 69 year old M with a history of  left lower extremity varicose veins previously seen in the office.  Ultrasound examination demonstrated venous insufficiency. A trial of compression stockings, exercise, elevation, and pain medication was attempted without relief and definitive treatment with radiofrequency ablation was offered. \par \par The patient has come for radiofrequency ablation treatment of the left great saphenous vein. \par I have discussed the risks of the procedure at length with the patient. The risks discussed were inclusive of but not limited to infection, irritation at the site of infiltration of local anesthesia, and also rare risk of deep venous thrombosis and pulmonary emboli. The patient agrees to proceed with the procedure. \par The patient was escorted into the procedure room and a time out called.\par The entire limb was prepped and draped in sterile fashion. The RF fiber was placed on the sterile field and connected by a sterile cable. Actuation, temperature, and impedance testing were performed to ensure that all components were connected and operating properly. \par The patient was placed on the procedure table and local anesthesia was instilled in the skin overlying the access site. Under ultrasound guidance, the vein was punctured with a micropuncture needle, using the anterior wall technique. A guide wire was now introduced through the needle, and the needle was then exchanged over the guide wire for a 7F sheath. The guide wire was removed and the RF probe was then placed into the left great saphenous vein through the sheath and position confirmed using ultrasound guidance. After the RF probe position was verified by ultrasound, tumescent anesthesia consisting of normal saline, 1% lidocaine with 8.4% sodium bicarbonate was infiltrated, under ultrasound guidance, precisely into the perivenous compartment along the entire length of the vein until a “halo” of fluid was noted around the vein. After RF probe position was again confirmed with ultrasound imaging, RF energy was applied. The probe was gradually and carefully withdrawn at a rate of 6.5cm/20seconds. \par \par The total volume injected was 450 cc. \par Total treatment time was 140 seconds.\par Treatment length was 39_ cm and  7 cycles of RF performed using the 7 cm probe. \par The probe is 3.4_cm from the SFJ.\par \par Estimated Blood Loss: minimal\par Repeat ultrasound of the treated vein was performed confirming successful treatment. The catheter and sheath were withdrawn and hemostasis established with direct pressure. After assuring hemostasis, a sterile 4x4 was placed on the access site and an ACE compression wrap was applied. Patient tolerated procedure well. Patient was given post-procedure instructions and follow up appointment was scheduled.\par \par \par

## 2020-01-27 ENCOUNTER — APPOINTMENT (OUTPATIENT)
Dept: WOUND CARE | Facility: CLINIC | Age: 70
End: 2020-01-27
Payer: MEDICARE

## 2020-01-27 ENCOUNTER — APPOINTMENT (OUTPATIENT)
Dept: VASCULAR SURGERY | Facility: CLINIC | Age: 70
End: 2020-01-27
Payer: MEDICARE

## 2020-01-27 PROCEDURE — 99213 OFFICE O/P EST LOW 20 MIN: CPT | Mod: 25

## 2020-01-27 PROCEDURE — 93971 EXTREMITY STUDY: CPT | Mod: LT

## 2020-01-27 NOTE — PHYSICAL EXAM
[JVD] : no jugular venous distention  [Normal Breath Sounds] : Normal breath sounds [Normal Rate and Rhythm] : normal rate and rhythm [0] : right 0 [1+] : left 1+ [Ankle Swelling (On Exam)] : present [] : bilaterally [Ankle Swelling Bilaterally] : severe [Abdomen Tenderness] : ~T ~M No abdominal tenderness [Alert] : alert [Oriented to Person] : oriented to person [Oriented to Place] : oriented to place [Oriented to Time] : oriented to time [Calm] : calm [de-identified] : nad, ambulatory, overweight/ left eye 20/20, wears glasses [de-identified] : no wheezing [de-identified] : ambulatory [FreeTextEntry1] : medial leg [FreeTextEntry2] : 0 [FreeTextEntry3] : 0 [FreeTextEntry4] : 0 [de-identified] :  compression stocking [de-identified] : \par \par  [FreeTextEntry7] : right lower leg [FreeTextEntry8] : 0 [FreeTextEntry9] : 0 [de-identified] : 0 [de-identified] :  compression stocking [de-identified] : \par \par  [TWNoteComboBox1] : Left [de-identified] : No [de-identified] : Other [TWNoteComboBox9] : Right [de-identified] : Multilayer Dynaflex Compression

## 2020-01-27 NOTE — DATA REVIEWED
[FreeTextEntry1] : [id number]\par tartrate- eye drops  left eye- for glaucoma, cataracts  left s/p cataract removal , lost peripheral vision left eye

## 2020-01-27 NOTE — ASSESSMENT
[FreeTextEntry1] : \par Mr. ROGERS SILVA is a 69 year with persistent and worsening bilateral lower extremity venous insufficiency, CEAP classification C5 which is unresponsive to at least 3 months of compression stockings 20-30 mmHg, leg elevation, exercise.  Patient has complaints of worsening  leg discomfort with swelling, and painful varicosities.  The patient’s symptoms interfere with their ADL’s, such as walking. Patient has intact pulses in both legs without evidence of arterial insufficiency.  new right ankle pain no fractures or dvt\par recurrent valvular insufficiency\par \par Treatment is indicated not for cosmetic reasons but for symptomatic venous reflux disease with symptoms which is refractory to conservative therapy. Venous duplex study demonstrates bilateral  lower extremity venous insufficiency. The need for definitive effective treatment is based on severe, interfering and worsening reflux symptoms with evidence of venous insufficiency on venous ultrasound. \par \par Patient is a candidate for endovenous ablation treatment of the  left  GSV and bilateral  SSV. \par The risks and benefits of endovenous ablation treatment versus continued conservative management were discussed with the patient.  Patient chooses endovenous ablation treatments. Treatment plan to be scheduled. \par \par \par s/p ablation vv surgery on right leg, no new phlebitis- eva normal with calcification of vessels on plain film\par \par \par received CircAid and lymphedema pump- likes juzo/lauryn better\par asa 650 for pain prn/   compression / pump\par going to see podiatry\par \par  \par Dequan lopez is the  [phone]; \par 12/22/17- overall improvement of both legs is related to availability of a residence in [facility]\par  He is living now in [facility] [address] [facility] [address] [address]\par moved from  [address] [place] [address]\par [address]\par \par \par \par

## 2020-02-25 NOTE — H&P ADULT - NSHPLANGLIMITEDENGLISH_GEN_A_CORE
The Office Visit    Assessment AND Plan     Dietary counseling/weight management.  She lost 9 lb over the last 4 weeks.  Continue with eating healthy, increase activities and the phentermine.  Atypical pneumonia, prescribed doxycycline b.i.d. for 7 days   Return 4 weeks for blood pressure and weight check.    CHIEF COMPLAINT    Weight management      History of present illness    She is here today for follow-up on weight management for which she is on phentermine to help with the weight loss as her BMI is 32.88.  She has lost 9 possible last 4 weeks since he started the medication, having no side effects from meds, she has also been watching her caloric intake and has increased her physical activities.  Has been having a cough going on for a month, it is productive of green sputum, it is moderate in severity, worsens with taking deep breath, it is also associated mild chest tightness dyspnea without hemoptysis or night sweats      I have reviewed the past medical, family and social history sections including the medications and allergies listed in the above medical record as well as the nursing notes.     Review of systems    No nausea vomiting or diarrhea.    Physical Exam    Vital Signs:  Blood pressure 100/70, pulse 92, resp. rate 12, height 5' 4\" (1.626 m), weight 86.9 kg, last menstrual period 02/01/2020, not currently breastfeeding.  Constitutional:  No acute distress.  Integument:  Warm.  Dry.  No erythema.  No rash.    HENT:  Normocephalic.  Atraumatic.  Bilateral external ears normal.  Oropharynx moist.  No oral exudates.  Nose normal.   Neck:  Normal range of motion.  No tenderness.  Supple.  No stridor.    Eyes:  PERRL (Pupils equal, round, reactive to light), EOMI (extraocular movements intact).  Conjunctivae normal.  No discharge.    Cardiovascular:  Normal heart rate.  Normal rhythm.  No murmurs.  No rubs.  No gallops.    Respiratory:  N harsh l breath sounds.  No respiratory distress.  No wheezing.   No chest tenderness.  Crackles at the base of the right lung  Gastrointestinal:  Bowel sounds normal.  Soft.  No tenderness.  No masses.  No pulsatile masses.    Neurologic:  Alert and oriented x3.  Normal gait.  No focal deficits noted.    Lymphatic:  No cervical or supraclavicular lymphadenopathy.  Extremities:  No edema.  Palpable pedal pulses bilaterally.    The patient indicates understanding of these issues and agrees with the plan.        No

## 2020-03-11 RX ORDER — SODIUM CHLORIDE 9 G/ML
0.9 INJECTION, SOLUTION INTRAVENOUS
Qty: 1 | Refills: 0 | Status: ACTIVE | COMMUNITY
Start: 2020-03-11 | End: 1900-01-01

## 2020-03-11 RX ORDER — LIDOCAINE HYDROCHLORIDE 10 MG/ML
1 INJECTION, SOLUTION INFILTRATION; PERINEURAL
Qty: 1 | Refills: 0 | Status: ACTIVE | COMMUNITY
Start: 2020-03-11 | End: 1900-01-01

## 2020-03-11 RX ORDER — SODIUM BICARBONATE 84 MG/ML
8.4 INJECTION, SOLUTION INTRAVENOUS
Qty: 1 | Refills: 0 | Status: ACTIVE | COMMUNITY
Start: 2020-03-11 | End: 1900-01-01

## 2020-03-17 ENCOUNTER — APPOINTMENT (OUTPATIENT)
Dept: VASCULAR SURGERY | Facility: CLINIC | Age: 70
End: 2020-03-17

## 2020-03-20 ENCOUNTER — APPOINTMENT (OUTPATIENT)
Dept: VASCULAR SURGERY | Facility: CLINIC | Age: 70
End: 2020-03-20

## 2020-03-25 NOTE — ED PROVIDER NOTE - NS ED MD DISPO DISCHARGE CCDA
March 25, 2020      JOCELYN Encarnacion  [address]   [address]  [place] [address]           [facility]  [address] [facility] [place] [address]  Phone: [phone]  Fax: [phone]          Patient: Cinthya Cuevas   MR Number: [id number]   YOB: 1933   Date of Visit: 3/25/2020       Dear Haley Clark:    Thank you for referring Cinthya Cuevas to me for evaluation. Attached you will find relevant portions of my assessment and plan of care.    If you have questions, please do not hesitate to call me. I look forward to following Cinthya Cuevas along with you.    Sincerely,    Theo Urias, ADOLFO    Enclosure  CC:  No Recipients    If you would like to receive this communication electronically, please contact [email or web address] or [phone] to request more information on [facility] access.    For providers and/or their staff who would like to refer a patient to [facility], please contact us through our one-stop-shop provider referral line, [facility], at [phone].    If you feel you have received this communication in error or would no longer like to receive these types of communications, please e-mail [email or web address]         
Patient/Caregiver provided printed discharge information.

## 2020-04-21 ENCOUNTER — APPOINTMENT (OUTPATIENT)
Dept: VASCULAR SURGERY | Facility: CLINIC | Age: 70
End: 2020-04-21

## 2020-04-24 ENCOUNTER — APPOINTMENT (OUTPATIENT)
Dept: VASCULAR SURGERY | Facility: CLINIC | Age: 70
End: 2020-04-24

## 2020-05-18 ENCOUNTER — APPOINTMENT (OUTPATIENT)
Dept: VASCULAR SURGERY | Facility: CLINIC | Age: 70
End: 2020-05-18

## 2020-10-04 NOTE — PATIENT PROFILE ADULT. - AS SC BRADEN FRICTION
[Seizure frequency] : Seizure frequency: Yes [Etiology, seizure type, and epilepsy syndrome] : Etiology, seizure type, and epilepsy syndrome: Yes [Side effects of anti-seizure medications] : Side effects of anti-seizure medications: Yes [Safety and education around seizures] : Safety and education around seizures: Yes [Treatment-resistant epilepsy (every visit)] : Treatment-resistant epilepsy (every visit): Yes [Adherence to medication(s)] : Adherence to medication(s): Yes [25 Hydroxy Vitamin D level assessed and Vitamin D3 ordered] : 25 Hydroxy Vitamin D level assessed and Vitamin D3 ordered: Yes (3) no apparent problem [Issues around driving] : Issues around driving: Not Applicable [Screening for anxiety, depression] : Screening for anxiety, depression: Not Applicable [Counseling for women of childbearing potential with epilepsy (including folic acid supplement)] : Counseling for women of childbearing potential with epilepsy (including folic acid supplement): Not Applicable [Options for adjunctive therapy (Neurostimulation, CBD, Dietary Therapy, Epilepsy Surgery)] : Options for adjunctive therapy (Neurostimulation, CBD, Dietary Therapy, Epilepsy Surgery): Not Applicable

## 2021-02-22 ENCOUNTER — OUTPATIENT (OUTPATIENT)
Dept: OUTPATIENT SERVICES | Facility: HOSPITAL | Age: 71
LOS: 1 days | End: 2021-02-22
Payer: MEDICARE

## 2021-02-22 ENCOUNTER — APPOINTMENT (OUTPATIENT)
Dept: SURGERY | Facility: CLINIC | Age: 71
End: 2021-02-22
Payer: MEDICARE

## 2021-02-22 VITALS
WEIGHT: 267 LBS | OXYGEN SATURATION: 96 % | TEMPERATURE: 97.9 F | BODY MASS INDEX: 37.38 KG/M2 | SYSTOLIC BLOOD PRESSURE: 144 MMHG | HEART RATE: 74 BPM | RESPIRATION RATE: 16 BRPM | DIASTOLIC BLOOD PRESSURE: 79 MMHG | HEIGHT: 71 IN

## 2021-02-22 DIAGNOSIS — Z00.00 ENCOUNTER FOR GENERAL ADULT MEDICAL EXAMINATION WITHOUT ABNORMAL FINDINGS: ICD-10-CM

## 2021-02-22 DIAGNOSIS — Z90.49 ACQUIRED ABSENCE OF OTHER SPECIFIED PARTS OF DIGESTIVE TRACT: Chronic | ICD-10-CM

## 2021-02-22 DIAGNOSIS — Z98.890 OTHER SPECIFIED POSTPROCEDURAL STATES: Chronic | ICD-10-CM

## 2021-02-22 PROCEDURE — 99072 ADDL SUPL MATRL&STAF TM PHE: CPT

## 2021-02-22 PROCEDURE — 99213 OFFICE O/P EST LOW 20 MIN: CPT

## 2021-02-22 PROCEDURE — G0463: CPT

## 2021-02-22 RX ORDER — SPIRONOLACTONE 50 MG/1
TABLET ORAL
Refills: 0 | Status: ACTIVE | COMMUNITY

## 2021-02-23 DIAGNOSIS — I87.309 CHRONIC VENOUS HYPERTENSION (IDIOPATHIC) WITHOUT COMPLICATIONS OF UNSPECIFIED LOWER EXTREMITY: ICD-10-CM

## 2021-02-23 DIAGNOSIS — I87.303 CHRONIC VENOUS HYPERTENSION (IDIOPATHIC) WITHOUT COMPLICATIONS OF BILATERAL LOWER EXTREMITY: ICD-10-CM

## 2021-02-24 NOTE — PHYSICAL EXAM
[Normal Breath Sounds] : Normal breath sounds [Normal Rate and Rhythm] : normal rate and rhythm [0] : right 0 [1+] : left 1+ [Ankle Swelling (On Exam)] : present [] : bilaterally [Ankle Swelling Bilaterally] : severe [Alert] : alert [Oriented to Person] : oriented to person [Oriented to Place] : oriented to place [Oriented to Time] : oriented to time [Calm] : calm [JVD] : no jugular venous distention  [Abdomen Tenderness] : ~T ~M No abdominal tenderness [de-identified] : nad, ambulatory, overweight/ left eye 20/20, wears glasses [de-identified] : no wheezing [de-identified] : ambulatory [FreeTextEntry1] : medial leg [FreeTextEntry2] : 0 [FreeTextEntry3] : 0 [FreeTextEntry4] : 0 [de-identified] :  compression stocking [de-identified] : \par \par 2/22/21 30/ 44 [FreeTextEntry7] : right lower leg [FreeTextEntry8] : 0 [FreeTextEntry9] : 0 [de-identified] : 0 [de-identified] :  compression stocking [de-identified] : \par \par 34/47 /40 [TWNoteComboBox1] : Left [de-identified] : No [de-identified] : Other [TWNoteComboBox9] : Right [de-identified] : Multilayer Dynaflex Compression

## 2021-02-24 NOTE — DATA REVIEWED
[FreeTextEntry1] : [id number]\par tartrate- eye drops  left eye- for glaucoma, cataracts  left s/p cataract removal , lost peripheral vision left eye

## 2021-02-24 NOTE — HISTORY OF PRESENT ILLNESS
[FreeTextEntry1] : Mr. ROGERS SILVA is a 70 year who presents for evaluation of bilateral leg pain  \par swelling /has lymphedema pump non compliant\par last visit in jan 2020 was for ablation, has not been back, put on weight\par no covid\par Patient's leg discomfort is associated with  leg swelling, fatigue, heaviness, \par \par Patient's symptoms have persisted despite conservative management with leg elevation, exercise, attempted weight loss, , and compression stocking use for more than 3 months. \par Patient's risks factor for venous disease are  obesity, history of varicose veins, spider veins \par Previous treatment, vein procedures and vein surgeries include:wearing compression stockings for more than 3 months with little or no relief   s/p ablation vv surgery on right leg,  treated for right leg cluster ulcers.now closed with compression therapy\par ankle r no fracture\par   \par Patient in shelter and was homeless for 6 years.\par

## 2021-02-24 NOTE — ASSESSMENT
[FreeTextEntry1] : \par Mr. ROGERS SILVA is a 70 year with persistent  bilateral lower extremity venous insufficiency,\par s/p ablations, will reorder stockings, \par ecouraged to use pump daily\par \par  CEAP classification C5 which is unresponsive to at least 3 months of compression stockings 20-30 mmHg, leg elevation, exercise.  Patient has complaints of worsening  leg discomfort with swelling, and painful varicosities.  The patient’s symptoms interfere with their ADL’s, such as walking. Patient has intact pulses in both legs without evidence of arterial insufficiency.  new right ankle pain no fractures or dvt\par recurrent valvular insufficiency\par \par Treatment is indicated not for cosmetic reasons but for symptomatic venous reflux disease with symptoms which is refractory to conservative therapy. Venous duplex study demonstrates bilateral  lower extremity venous insufficiency. The need for definitive effective treatment is based on severe, interfering and worsening reflux symptoms with evidence of venous insufficiency on venous ultrasound. \par \par Patient is a candidate for endovenous ablation treatment of the  left  GSV and bilateral  SSV. \par The risks and benefits of endovenous ablation treatment versus continued conservative management were discussed with the patient.  Patient chooses endovenous ablation treatments. Treatment plan to be scheduled. \par \par s/p ablation vv surgery on right leg, no new phlebitis- eva normal with calcification of vessels on plain film\par \par received CircAid and lymphedema pump- likes maynor/lauryn better\par asa 650 for pain prn/   compression / pump\par going to see podiatry\par \par  \par Dequan lopez is the  [phone]; \par 12/22/17- overall improvement of both legs is related to availability of a residence in [place]\par   \par \par \par \par

## 2021-03-05 ENCOUNTER — NON-APPOINTMENT (OUTPATIENT)
Age: 71
End: 2021-03-05

## 2021-03-08 ENCOUNTER — APPOINTMENT (OUTPATIENT)
Dept: WOUND CARE | Facility: CLINIC | Age: 71
End: 2021-03-08
Payer: MEDICARE

## 2021-03-08 VITALS — HEIGHT: 71 IN | WEIGHT: 267 LBS | BODY MASS INDEX: 37.38 KG/M2 | TEMPERATURE: 97.1 F

## 2021-03-08 PROCEDURE — 99213 OFFICE O/P EST LOW 20 MIN: CPT

## 2021-03-08 PROCEDURE — 99072 ADDL SUPL MATRL&STAF TM PHE: CPT

## 2021-03-13 NOTE — DATA REVIEWED
[FreeTextEntry1] : [id number]\par tartrate- eye drops  left eye- for glaucoma, cataracts  left s/p cataract removal , lost peripheral vision left eye

## 2021-03-13 NOTE — HISTORY OF PRESENT ILLNESS
[FreeTextEntry1] : Mr. ROGERS SILVA is a 69 year who presents for evaluation of bilateral leg pain since November 2019\par Patient's leg discomfort is associated with  leg swelling, fatigue, heaviness, \par \par Patient's symptoms have persisted despite conservative management with leg elevation, exercise, attempted weight loss, , and compression stocking use for more than 3 months. \par \par Patient's risks factor for venous disease are  obesity, history of varicose veins, spider veins \par \par Previous treatment, vein procedures and vein surgeries include:wearing compression stockings for more than 3 months with little or no relief   s/p ablation vv surgery on right leg,  treated for right leg cluster ulcers.now closed with compression therapy\par ankle r no fracture\par   \par Patient in shelter and was homeless for 6 years now living in [facility]\par

## 2021-03-13 NOTE — PHYSICAL EXAM
[JVD] : no jugular venous distention  [Normal Breath Sounds] : Normal breath sounds [Normal Rate and Rhythm] : normal rate and rhythm [0] : right 0 [1+] : left 1+ [Ankle Swelling (On Exam)] : present [] : bilaterally [Ankle Swelling Bilaterally] : severe [Abdomen Tenderness] : ~T ~M No abdominal tenderness [Alert] : alert [Oriented to Person] : oriented to person [Oriented to Place] : oriented to place [Oriented to Time] : oriented to time [Calm] : calm [de-identified] : nad, ambulatory, overweight/ left eye 20/20, wears glasses [de-identified] : no wheezing [FreeTextEntry1] : No overt ischemia right or left leg, feet' swollen right foot [de-identified] : ambulatory [de-identified] : \par \par  [FreeTextEntry7] : right lower leg [FreeTextEntry8] : 0 [FreeTextEntry9] : 0 [de-identified] : 0 [de-identified] :  compression stocking [de-identified] : \par \par  [TWNoteComboBox1] : False [de-identified] : False [de-identified] : False [TWNoteComboBox9] : Right [de-identified] : Multilayer Dynaflex Compression

## 2021-03-22 ENCOUNTER — APPOINTMENT (OUTPATIENT)
Dept: WOUND CARE | Facility: CLINIC | Age: 71
End: 2021-03-22
Payer: MEDICARE

## 2021-03-22 VITALS — WEIGHT: 267 LBS | BODY MASS INDEX: 37.38 KG/M2 | HEIGHT: 71 IN | TEMPERATURE: 97.2 F

## 2021-03-22 VITALS — DIASTOLIC BLOOD PRESSURE: 75 MMHG | HEART RATE: 78 BPM | SYSTOLIC BLOOD PRESSURE: 149 MMHG

## 2021-03-22 DIAGNOSIS — S81.802D UNSPECIFIED OPEN WOUND, LEFT LOWER LEG, SUBSEQUENT ENCOUNTER: ICD-10-CM

## 2021-03-22 PROCEDURE — 93970 EXTREMITY STUDY: CPT

## 2021-03-22 PROCEDURE — 93922 UPR/L XTREMITY ART 2 LEVELS: CPT

## 2021-03-22 PROCEDURE — 99072 ADDL SUPL MATRL&STAF TM PHE: CPT

## 2021-03-22 PROCEDURE — 29581 APPL MULTLAYER CMPRN SYS LEG: CPT | Mod: 50

## 2021-03-26 PROBLEM — S81.802D WOUND OF LEFT LOWER EXTREMITY, SUBSEQUENT ENCOUNTER: Status: ACTIVE | Noted: 2017-02-24

## 2021-03-26 NOTE — PHYSICAL EXAM
[Normal Breath Sounds] : Normal breath sounds [Normal Rate and Rhythm] : normal rate and rhythm [0] : right 0 [1+] : left 1+ [Ankle Swelling (On Exam)] : present [] : bilaterally [Ankle Swelling Bilaterally] : severe [Alert] : alert [Oriented to Person] : oriented to person [Oriented to Place] : oriented to place [Oriented to Time] : oriented to time [Calm] : calm [JVD] : no jugular venous distention  [Abdomen Tenderness] : ~T ~M No abdominal tenderness [de-identified] : nad, ambulatory, overweight/ left eye 20/20, wears glasses [de-identified] : no wheezing [FreeTextEntry1] : No overt ischemia right or left leg, feet' swollen right foot [de-identified] : ambulatory [de-identified] : \par \par  [FreeTextEntry7] : right lower leg [FreeTextEntry8] : 0 [FreeTextEntry9] : 0 [de-identified] : 0 [de-identified] :  compression stocking [de-identified] : \par \par  [TWNoteComboBox9] : Right [de-identified] : Multilayer Dynaflex Compression

## 2021-03-26 NOTE — PLAN
[FreeTextEntry1] : Plan- repeat study in 6 months, no procedures recommended at this time. Patient to wear compression socks\par Lymphedema pump recommended\par No open wounds\par No clinical sign of infection

## 2021-03-26 NOTE — ASSESSMENT
[FreeTextEntry1] : \par Mr. ROGERS SILVA is a 69 year with persistent and worsening bilateral lower extremity venous insufficiency, CEAP classification C5 which is unresponsive to at least 3 months of compression stockings 20-30 mmHg, leg elevation, exercise.  Patient has complaints of worsening  leg discomfort with swelling, and painful varicosities.  The patient’s symptoms interfere with their ADL’s, such as walking. Patient has intact pulses in both legs without evidence of arterial insufficiency.  new right ankle pain no fractures or dvt\par recurrent valvular insufficiency\par \par Treatment is indicated not for cosmetic reasons but for symptomatic venous reflux disease with symptoms which is refractory to conservative therapy. Venous duplex study demonstrates bilateral  lower extremity venous insufficiency. The need for definitive effective treatment is based on severe, interfering and worsening reflux symptoms with evidence of venous insufficiency on venous ultrasound. \par \par Patient is a candidate for endovenous ablation treatment of the  left  GSV and bilateral  SSV. \par The risks and benefits of endovenous ablation treatment versus continued conservative management were discussed with the patient.  Patient chooses endovenous ablation treatments. Treatment plan to be scheduled. \par \par s/p ablation vv surgery on right leg, no new phlebitis- eva normal with calcification of vessels on plain film\par \par received CircAid and lymphedema pump- likes juzo/lauryn better\par asa 650 for pain prn/   compression / pump\par going to see podiatry\par  \par Dequan lopez is the  [phone]; \par 12/22/17- overall improvement of both legs is related to availability of a residence in [facility]\par  He is living now in [place] [address] [address] [facility] [address] [facility] [address]\par moved from  [address] [place] [address]\par [address]\par \par Patient had vascular testing done and the results were explained. PVR was triphasic with good results. Patient still waiting for compression socks.   Vascular testing performed demonstrates successful prior ablation.\par Compression stockings recommended

## 2021-03-26 NOTE — DATA REVIEWED
[FreeTextEntry1] : [id number]\par tartrate- eye drops  left eye- for glaucoma, cataracts  left s/p cataract removal , lost peripheral vision left eye

## 2021-03-26 NOTE — HISTORY OF PRESENT ILLNESS
[FreeTextEntry1] : Mr. ROGERS SILVA is a 69 year who presents for evaluation of bilateral leg pain since November 2019\par Patient's leg discomfort is associated with  leg swelling, fatigue, heaviness,   He states that he has not completed his ablation procedures due to COVID.\par \par Patient's symptoms have persisted despite conservative management with leg elevation, exercise, attempted weight loss, , and compression stocking use for more than 3 months. \par \par Patient's risks factor for venous disease are  obesity, history of varicose veins, spider veins \par \par Previous treatment, vein procedures and vein surgeries include:wearing compression stockings for more than 3 months with little or no relief   s/p ablation vv surgery on right leg,  treated for right leg cluster ulcers.now closed with compression therapy\par ankle r no fracture\par   \par Patient in shelter and was homeless for 6 years now living in [facility]\par

## 2021-04-05 ENCOUNTER — APPOINTMENT (OUTPATIENT)
Dept: WOUND CARE | Facility: CLINIC | Age: 71
End: 2021-04-05
Payer: MEDICARE

## 2021-04-05 VITALS
HEART RATE: 64 BPM | BODY MASS INDEX: 25.2 KG/M2 | TEMPERATURE: 97.3 F | DIASTOLIC BLOOD PRESSURE: 72 MMHG | RESPIRATION RATE: 18 BRPM | HEIGHT: 71 IN | SYSTOLIC BLOOD PRESSURE: 130 MMHG | WEIGHT: 180 LBS

## 2021-04-05 PROCEDURE — 99213 OFFICE O/P EST LOW 20 MIN: CPT

## 2021-04-05 PROCEDURE — 99072 ADDL SUPL MATRL&STAF TM PHE: CPT

## 2021-04-05 RX ORDER — CARVEDILOL 12.5 MG/1
12.5 TABLET, FILM COATED ORAL
Qty: 180 | Refills: 0 | Status: ACTIVE | COMMUNITY
Start: 2021-03-16

## 2021-04-05 RX ORDER — BRIMONIDINE TARTRATE 2 MG/MG
0.2 SOLUTION/ DROPS OPHTHALMIC
Qty: 10 | Refills: 0 | Status: ACTIVE | COMMUNITY
Start: 2021-03-30

## 2021-04-05 RX ORDER — MECLIZINE HYDROCHLORIDE 12.5 MG/1
12.5 TABLET ORAL
Qty: 90 | Refills: 0 | Status: ACTIVE | COMMUNITY
Start: 2021-03-10

## 2021-04-05 RX ORDER — DORZOLAMIDE HYDROCHLORIDE TIMOLOL MALEATE 20; 5 MG/ML; MG/ML
22.3-6.8 SOLUTION/ DROPS OPHTHALMIC
Qty: 20 | Refills: 0 | Status: ACTIVE | COMMUNITY
Start: 2021-04-02

## 2021-04-05 RX ORDER — AMLODIPINE BESYLATE 5 MG/1
5 TABLET ORAL
Qty: 180 | Refills: 0 | Status: ACTIVE | COMMUNITY
Start: 2021-03-16

## 2021-04-05 RX ORDER — AMLODIPINE BESYLATE 10 MG/1
10 TABLET ORAL
Refills: 0 | Status: DISCONTINUED | COMMUNITY
End: 2021-04-05

## 2021-04-14 NOTE — PLAN
[FreeTextEntry1] : Plan- Patient to wear compression socks\par Lymphedema pump recommended\par No open wounds\par No clinical sign of infection

## 2021-04-14 NOTE — DATA REVIEWED
[FreeTextEntry1] : [id number]\par tartrate- eye drops  left eye- for glaucoma, cataracts  left s/p cataract removal , lost peripheral vision left eye

## 2021-04-14 NOTE — ASSESSMENT
[FreeTextEntry1] : Patient still waiting on compression stockings from Dr. Dickey, uses lymphedema pump about 2x's per week for an hour each time. Prescriptions given for stockings

## 2021-04-14 NOTE — PHYSICAL EXAM
[Normal Breath Sounds] : Normal breath sounds [Normal Rate and Rhythm] : normal rate and rhythm [0] : right 0 [1+] : left 1+ [Ankle Swelling (On Exam)] : present [] : bilaterally [Ankle Swelling Bilaterally] : severe [Alert] : alert [Oriented to Person] : oriented to person [Oriented to Place] : oriented to place [Oriented to Time] : oriented to time [Calm] : calm [Please See PDF for [employer]] : Please See PDF for [employer]. [JVD] : no jugular venous distention  [Abdomen Tenderness] : ~T ~M No abdominal tenderness [de-identified] : nad, ambulatory, overweight/ left eye 20/20, wears glasses [de-identified] : atraumatic [de-identified] : no wheezing [FreeTextEntry1] : No overt ischemia right or left leg, feet' swollen right foot [de-identified] : ambulatory

## 2021-04-14 NOTE — HISTORY OF PRESENT ILLNESS
[FreeTextEntry1] : Mr. ROGERS SILVA is a 69 year who presents for evaluation of bilateral leg pain since November 2019\par Patient's leg discomfort is associated with  leg swelling, fatigue, heaviness,   He states that he has not completed his ablation procedures due to COVID.\par \par Patient's symptoms have persisted despite conservative management with leg elevation, exercise, attempted weight loss, , and compression stocking use for more than 3 months. \par \par Patient's risks factor for venous disease are  obesity, history of varicose veins, spider veins \par \par Previous treatment, vein procedures and vein surgeries include:wearing compression stockings for more than 3 months with little or no relief   s/p ablation vv surgery on right leg,  treated for right leg cluster ulcers.now closed with compression therapy\par ankle r no fracture\par   \par Patient in shelter and was homeless for 6 years now living in [facility]\par

## 2021-05-24 ENCOUNTER — OUTPATIENT (OUTPATIENT)
Dept: OUTPATIENT SERVICES | Facility: HOSPITAL | Age: 71
LOS: 1 days | End: 2021-05-24
Payer: MEDICARE

## 2021-05-24 ENCOUNTER — APPOINTMENT (OUTPATIENT)
Dept: SURGERY | Facility: CLINIC | Age: 71
End: 2021-05-24
Payer: MEDICARE

## 2021-05-24 VITALS
HEIGHT: 71 IN | DIASTOLIC BLOOD PRESSURE: 76 MMHG | WEIGHT: 275 LBS | TEMPERATURE: 97.5 F | BODY MASS INDEX: 38.5 KG/M2 | OXYGEN SATURATION: 95 % | HEART RATE: 70 BPM | SYSTOLIC BLOOD PRESSURE: 146 MMHG | RESPIRATION RATE: 18 BRPM

## 2021-05-24 DIAGNOSIS — Z00.00 ENCOUNTER FOR GENERAL ADULT MEDICAL EXAMINATION WITHOUT ABNORMAL FINDINGS: ICD-10-CM

## 2021-05-24 DIAGNOSIS — Z90.49 ACQUIRED ABSENCE OF OTHER SPECIFIED PARTS OF DIGESTIVE TRACT: Chronic | ICD-10-CM

## 2021-05-24 DIAGNOSIS — Z98.890 OTHER SPECIFIED POSTPROCEDURAL STATES: Chronic | ICD-10-CM

## 2021-05-24 DIAGNOSIS — Z59.0 HOMELESSNESS: ICD-10-CM

## 2021-05-24 DIAGNOSIS — Z78.9 OTHER SPECIFIED HEALTH STATUS: ICD-10-CM

## 2021-05-24 DIAGNOSIS — I83.893 VARICOSE VEINS OF BILATERAL LOWER EXTREMITIES WITH OTHER COMPLICATIONS: ICD-10-CM

## 2021-05-24 PROCEDURE — G0463: CPT

## 2021-05-24 PROCEDURE — 99213 OFFICE O/P EST LOW 20 MIN: CPT

## 2021-05-24 SDOH — ECONOMIC STABILITY - HOUSING INSECURITY: HOMELESSNESS: Z59.0

## 2021-05-25 DIAGNOSIS — I87.2 VENOUS INSUFFICIENCY (CHRONIC) (PERIPHERAL): ICD-10-CM

## 2021-06-01 PROBLEM — Z78.9 NON-SMOKER: Status: ACTIVE | Noted: 2017-09-21

## 2021-06-01 PROBLEM — Z59.0 HOMELESSNESS: Status: ACTIVE | Noted: 2017-02-17

## 2021-06-01 PROBLEM — I83.893 VARICOSE VEINS OF BILATERAL LOWER EXTREMITIES WITH OTHER COMPLICATIONS: Status: ACTIVE | Noted: 2019-12-22

## 2021-06-01 NOTE — PHYSICAL EXAM
[Normal Breath Sounds] : Normal breath sounds [Normal Rate and Rhythm] : normal rate and rhythm [0] : right 0 [1+] : left 1+ [Ankle Swelling (On Exam)] : present [] : bilaterally [Ankle Swelling Bilaterally] : severe [Alert] : alert [Oriented to Person] : oriented to person [Oriented to Place] : oriented to place [Oriented to Time] : oriented to time [Calm] : calm [Please See PDF for [employer]] : Please See PDF for [employer]. [JVD] : no jugular venous distention  [Abdomen Tenderness] : ~T ~M No abdominal tenderness [de-identified] : atraumatic [de-identified] : nad, ambulatory, overweight/ left eye 20/20, wears glasses [de-identified] : no wheezing [FreeTextEntry1] : No overt ischemia right or left leg, feet' swollen right foot [de-identified] : ambulatory

## 2021-06-01 NOTE — HISTORY OF PRESENT ILLNESS
[FreeTextEntry1] : Mr. ROGERS SILVA is a 69 year who presents for evaluation of bilateral leg pain since November 2019\par Patient's leg discomfort is associated with  leg swelling, fatigue, heaviness,   He states that he has not completed his ablation procedures due to COVID.\par using pump\par discussed with dr CHAMPAGNE- no repeat ablation needed\par both legs are closed-svg at this time\par likes previous lymphedema pump better\par would like JUZO socks\par \par Patient's symptoms have persisted despite conservative management with leg elevation, exercise, attempted weight loss, , and compression stocking use for more than 3 months. \par \par Patient's risks factor for venous disease are  obesity, history of varicose veins, spider veins \par \par Previous treatment, vein procedures and vein surgeries include:wearing compression stockings for more than 3 months with little or no relief   s/p ablation vv surgery on right leg,  treated for right leg cluster ulcers.now closed with compression therapy\par ankle r no fracture\par   \par Patient in shelter and was homeless for 6 years now living in [facility]\par

## 2021-06-01 NOTE — DATA REVIEWED
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

## 2021-06-01 NOTE — PLAN
[FreeTextEntry1] : Plan- Patient to wear compression socks\par Lymphedema pump recommended\par No open wounds\par No clinical sign of infection\par fup 3 months\par faxed rx to [facility]

## 2021-06-01 NOTE — ASSESSMENT
[FreeTextEntry1] : 71 yr old m w vhtn leg swelling using compression\par would like new socks, measured previously, stable\par using lymphedema pump emphasized compliance\par s/p ablations\par \par Patient still waiting on compression stockings from Dr. Dickey,\par  uses lymphedema pump about 2x's per week for an hour each time.\par  Prescriptions given for stockings

## 2021-08-23 ENCOUNTER — APPOINTMENT (OUTPATIENT)
Dept: SURGERY | Facility: CLINIC | Age: 71
End: 2021-08-23
Payer: MEDICARE

## 2021-08-23 ENCOUNTER — OUTPATIENT (OUTPATIENT)
Dept: OUTPATIENT SERVICES | Facility: HOSPITAL | Age: 71
LOS: 1 days | End: 2021-08-23
Payer: MEDICARE

## 2021-08-23 VITALS
DIASTOLIC BLOOD PRESSURE: 80 MMHG | OXYGEN SATURATION: 95 % | HEART RATE: 71 BPM | BODY MASS INDEX: 38.5 KG/M2 | WEIGHT: 275 LBS | TEMPERATURE: 97 F | SYSTOLIC BLOOD PRESSURE: 145 MMHG | HEIGHT: 71 IN

## 2021-08-23 DIAGNOSIS — Z98.890 OTHER SPECIFIED POSTPROCEDURAL STATES: Chronic | ICD-10-CM

## 2021-08-23 DIAGNOSIS — Z00.00 ENCOUNTER FOR GENERAL ADULT MEDICAL EXAMINATION WITHOUT ABNORMAL FINDINGS: ICD-10-CM

## 2021-08-23 DIAGNOSIS — Z90.49 ACQUIRED ABSENCE OF OTHER SPECIFIED PARTS OF DIGESTIVE TRACT: Chronic | ICD-10-CM

## 2021-08-23 PROCEDURE — XXXXX: CPT

## 2021-08-23 PROCEDURE — G0463: CPT

## 2021-08-23 NOTE — DATA REVIEWED
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

## 2021-08-23 NOTE — ASSESSMENT
[FreeTextEntry1] : 71 yr old m w vhtn leg swelling using compression\par would like new socks, measured previously, stable\par right ankle 30.5 calf 45\par left 27.5/ 45\par using lymphedema pump emphasized compliance\par s/p ablations\par \par Patient still waiting on compression stockings from Dr. Dickey,\par  uses lymphedema pump about 2x's per week for an hour each time.\par  Prescriptions given for stockings

## 2021-08-23 NOTE — PHYSICAL EXAM
[JVD] : no jugular venous distention  [Normal Breath Sounds] : Normal breath sounds [Normal Rate and Rhythm] : normal rate and rhythm [0] : right 0 [1+] : left 1+ [Ankle Swelling (On Exam)] : present [] : bilaterally [Ankle Swelling Bilaterally] : severe [Abdomen Tenderness] : ~T ~M No abdominal tenderness [Alert] : alert [Oriented to Person] : oriented to person [Oriented to Place] : oriented to place [Oriented to Time] : oriented to time [Calm] : calm [de-identified] : nad, ambulatory, overweight/ left eye 20/20, wears glasses [de-identified] : atraumatic [de-identified] : no wheezing [FreeTextEntry1] : No overt ischemia right or left leg, feet' swollen right foot [de-identified] : ambulatory [Please See PDF for [employer]] : Please See PDF for [employer].

## 2021-08-23 NOTE — HISTORY OF PRESENT ILLNESS
[FreeTextEntry1] : Mr. ROGERS SILVA is a 69 year who presents for evaluation of bilateral leg pain since November 2019\par Patient's leg discomfort is associated with  leg swelling, fatigue, heaviness,   He states that he has not completed his ablation procedures due to COVID.\par using pump\par here with soocks\par discussed with dr CHAMPAGNE- no repeat ablation needed\par both legs are closed-svg at this time\par likes previous lymphedema pump better\par would like JUZO socks\par \par Patient's symptoms have persisted despite conservative management with leg elevation, exercise, attempted weight loss, , and compression stocking use for more than 3 months. \par \par Patient's risks factor for venous disease are  obesity, history of varicose veins, spider veins \par \par Previous treatment, vein procedures and vein surgeries include:wearing compression stockings for more than 3 months with little or no relief   s/p ablation vv surgery on right leg,  treated for right leg cluster ulcers.now closed with compression therapy\par ankle r no fracture\par   \par Patient in shelter and was homeless for 6 years now living in [facility]\par

## 2021-08-25 DIAGNOSIS — I83.022 VARICOSE VEINS OF LEFT LOWER EXTREMITY WITH ULCER OF CALF: ICD-10-CM

## 2021-08-25 DIAGNOSIS — I83.012 VARICOSE VEINS OF RIGHT LOWER EXTREMITY WITH ULCER OF CALF: ICD-10-CM

## 2021-08-31 NOTE — PATIENT PROFILE ADULT. - MEDICATION ADMINISTRATION INFO, PROFILE
[Dear  ___] : Dear  [unfilled], [Courtesy Letter:] : I had the pleasure of seeing your patient, [unfilled], in my office today. [Please see my note below.] : Please see my note below. no concerns

## 2021-09-01 NOTE — BRIEF OPERATIVE NOTE - POST-OP DX
Cholelithiasis  07/20/2017    Daisy Leonard  Incarcerated umbilical hernia  07/20/2017    Daisy Leonard
no weight-bearing restrictions

## 2021-10-04 ENCOUNTER — APPOINTMENT (OUTPATIENT)
Dept: WOUND CARE | Facility: CLINIC | Age: 71
End: 2021-10-04
Payer: MEDICARE

## 2021-10-04 DIAGNOSIS — I89.0 LYMPHEDEMA, NOT ELSEWHERE CLASSIFIED: ICD-10-CM

## 2021-10-04 DIAGNOSIS — T14.90XA INJURY, UNSPECIFIED, INITIAL ENCOUNTER: ICD-10-CM

## 2021-10-04 PROCEDURE — 99213 OFFICE O/P EST LOW 20 MIN: CPT

## 2021-10-04 PROCEDURE — 93970 EXTREMITY STUDY: CPT

## 2021-10-06 PROBLEM — I89.0 LYMPHEDEMA OF BOTH LOWER EXTREMITIES: Status: ACTIVE | Noted: 2017-11-03

## 2021-10-06 PROBLEM — T14.90XA CLOSED WOUND: Status: ACTIVE | Noted: 2019-11-25

## 2021-10-08 NOTE — DATA REVIEWED
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

## 2021-10-08 NOTE — PLAN
[FreeTextEntry1] : Plan- Patient wearing compression socks\par Lymphedema pump - said sleeves are not fitting properly\par No open wounds\par No clinical sign of infection\par fup prn\par No clinical sign of infection

## 2021-10-08 NOTE — PHYSICAL EXAM
[Normal Breath Sounds] : Normal breath sounds [Normal Rate and Rhythm] : normal rate and rhythm [0] : right 0 [1+] : left 1+ [Ankle Swelling (On Exam)] : present [] : bilaterally [Ankle Swelling Bilaterally] : severe [Alert] : alert [Oriented to Person] : oriented to person [Oriented to Place] : oriented to place [Oriented to Time] : oriented to time [Calm] : calm [Please See PDF for [employer]] : Please See PDF for [employer]. [JVD] : no jugular venous distention  [Abdomen Tenderness] : ~T ~M No abdominal tenderness [de-identified] : nad, ambulatory, overweight/ left eye 20/20, wears glasses [de-identified] : atraumatic [de-identified] : no wheezing [FreeTextEntry1] : No overt ischemia right or left leg, feet' swollen right foot [de-identified] : ambulatory

## 2021-10-08 NOTE — ASSESSMENT
[FreeTextEntry1] : 71 yr old m w vhtn leg swelling using compression\par would like new socks, measured previously, stable\par right ankle 30.5 calf 45\par left 27.5/ 45\par using lymphedema pump emphasized compliance\par s/p ablations\par \par Patient still waiting on compression stockings from Dr. Dickey,\par  uses lymphedema pump about 2x's per week for an hour each time.\par  Prescriptions given for stockings\par chronic lymphedema

## 2021-10-08 NOTE — HISTORY OF PRESENT ILLNESS
[FreeTextEntry1] : Mr. ROGERS SILVA is a 69 year who presents for evaluation of bilateral leg pain since November 2019\par Patient's leg discomfort is associated with  leg swelling, fatigue, heaviness,   He states that he has not completed his ablation procedures due to COVID.\par using pump\par here with soocks\par discussed with dr CHAMPAGNE- no repeat ablation needed\par both legs are closed-svg at this time\par likes previous lymphedema pump better\par would like JUZO socks\par \par Patient's symptoms have persisted despite conservative management with leg elevation, exercise, attempted weight loss, , and compression stocking use for more than 3 months. \par \par Patient's risks factor for venous disease are  obesity, history of varicose veins, spider veins \par \par Previous treatment, vein procedures and vein surgeries include:wearing compression stockings for more than 3 months with little or no relief   s/p ablation vv surgery on right leg,  treated for right leg cluster ulcers.now closed with compression therapy\par ankle r no fracture\par   \par Patient in shelter and was homeless for 6 years now living in [facility]\par

## 2021-11-22 ENCOUNTER — OUTPATIENT (OUTPATIENT)
Dept: OUTPATIENT SERVICES | Facility: HOSPITAL | Age: 71
LOS: 1 days | End: 2021-11-22
Payer: MEDICARE

## 2021-11-22 ENCOUNTER — APPOINTMENT (OUTPATIENT)
Dept: SURGERY | Facility: CLINIC | Age: 71
End: 2021-11-22
Payer: MEDICARE

## 2021-11-22 VITALS
HEIGHT: 71 IN | HEART RATE: 66 BPM | OXYGEN SATURATION: 99 % | RESPIRATION RATE: 18 BRPM | DIASTOLIC BLOOD PRESSURE: 74 MMHG | WEIGHT: 72 LBS | TEMPERATURE: 98 F | SYSTOLIC BLOOD PRESSURE: 127 MMHG | BODY MASS INDEX: 10.08 KG/M2

## 2021-11-22 DIAGNOSIS — Z90.49 ACQUIRED ABSENCE OF OTHER SPECIFIED PARTS OF DIGESTIVE TRACT: Chronic | ICD-10-CM

## 2021-11-22 DIAGNOSIS — Z98.890 OTHER SPECIFIED POSTPROCEDURAL STATES: Chronic | ICD-10-CM

## 2021-11-22 DIAGNOSIS — Z00.00 ENCOUNTER FOR GENERAL ADULT MEDICAL EXAMINATION WITHOUT ABNORMAL FINDINGS: ICD-10-CM

## 2021-11-22 PROCEDURE — 99213 OFFICE O/P EST LOW 20 MIN: CPT

## 2021-11-22 PROCEDURE — G0463: CPT

## 2021-11-22 NOTE — PHYSICAL EXAM
[Normal Breath Sounds] : Normal breath sounds [Normal Rate and Rhythm] : normal rate and rhythm [0] : right 0 [1+] : left 1+ [Ankle Swelling (On Exam)] : present [] : bilaterally [Ankle Swelling Bilaterally] : severe [Alert] : alert [Oriented to Person] : oriented to person [Oriented to Place] : oriented to place [Oriented to Time] : oriented to time [Calm] : calm [Please See PDF for [employer]] : Please See PDF for [employer]. [JVD] : no jugular venous distention  [Abdomen Tenderness] : ~T ~M No abdominal tenderness [de-identified] : nad, ambulatory, overweight/ left eye 20/20, wears glasses [de-identified] : atraumatic [de-identified] : no wheezing [FreeTextEntry1] : No overt ischemia right or left leg, feet' swollen right foot [de-identified] : ambulatory

## 2021-11-22 NOTE — ASSESSMENT
[FreeTextEntry1] : 71 yr old m w vhtn leg swelling using compression\par would like new socks, measured previously, stable\par 11/22- full measurements for lymphedema sleeves done- see atached, no open wounds currently dimensions stable\par \par previousright ankle 30.5 calf 45\par left 27.5/ 45\par using lymphedema pump emphasized compliance\par s/p ablations\par \par Patient still waiting on compression stockings from Dr. Dickey,\par  uses lymphedema pump about 2x's per week for an hour each time.\par  Prescriptions given for stockings\par chronic lymphedema

## 2021-11-22 NOTE — DATA REVIEWED
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

## 2021-11-22 NOTE — PLAN
Wt Readings from Last 3 Encounters:   02/07/18 188 lb 3.2 oz (85.4 kg)   10/03/17 185 lb 4.8 oz (84.1 kg)   06/22/17 184 lb (83.5 kg)     Temp Readings from Last 3 Encounters:   02/07/18 97.1 °F (36.2 °C) (Oral)   10/03/17 97.3 °F (36.3 °C) (Oral)   06/22/17 97.8 °F (36.6 °C) (Oral)     BP Readings from Last 3 Encounters:   02/07/18 146/78   10/03/17 151/89   06/22/17 (!) 112/91     Pulse Readings from Last 3 Encounters:   02/07/18 68   10/03/17 75   06/22/17 78   1. Have you been to the ER, urgent care clinic since your last visit? Hospitalized since your last visit? No    2. Have you seen or consulted any other health care providers outside of the [address] [place] [address] since your last visit? Include any pap smears or colon screening. PCP for follow up.  for colonoscopy. [FreeTextEntry1] : Plan- Patient wearing compression socks\par Lymphedema pump - said sleeves are not fitting properly- speaking with vendor for that and circaids\par No open wounds\par No clinical sign of infection\par fup prn\par No clinical sign of infection

## 2021-11-22 NOTE — HISTORY OF PRESENT ILLNESS
[FreeTextEntry1] : Mr. ROGERS SILVA is a 71 year who presents for evaluation of bilateral leg pain since November 2019\par Patient's leg discomfort is associated with  leg swelling, fatigue, heaviness,   He states that he has not completed his ablation procedures due to COVID.\par saw dr kennedy in oct \par using pump ocasionally, does not like it- michelin man jenna\par here with soocks\par discussed with dr KENNEDY- no repeat ablation needed\par both legs are closed-svg at this time\par likes previous lymphedema pump better\par would like JUZO socks\par \par Patient's symptoms have persisted despite conservative management with leg elevation, exercise, attempted weight loss, , and compression stocking use for more than 3 months. \par \par Patient's risks factor for venous disease are  obesity, history of varicose veins, spider veins \par \par Previous treatment, vein procedures and vein surgeries include:wearing compression stockings for more than 3 months with little or no relief   s/p ablation vv surgery on right leg,  treated for right leg cluster ulcers.now closed with compression therapy\par ankle r no fracture\par   \par Patient in shelter and was homeless for 6 years now living in [facility]\par

## 2021-11-23 DIAGNOSIS — I87.303 CHRONIC VENOUS HYPERTENSION (IDIOPATHIC) WITHOUT COMPLICATIONS OF BILATERAL LOWER EXTREMITY: ICD-10-CM

## 2021-11-23 DIAGNOSIS — I87.2 VENOUS INSUFFICIENCY (CHRONIC) (PERIPHERAL): ICD-10-CM

## 2021-12-02 NOTE — ED ADULT NURSE NOTE - CHIEF COMPLAINT QUOTE
11/9/21 48 yo lady pt referred by Dr Raya Grullon. She had a colonoscopy about 11 years ago for the same complaints she is here about today. Says had her GB removed in 2010 for the same reason.  She is here for runny stools, stools are never formed "ever" and she has had issues with incontinence.  Her uroGYN put her on colestipol which constipated her. She started to take it qod which gave her formed stools w/o incontinent issues. Now she has lost response to qod but has not tried qday due to fear of constipation. BM frequency is after every meal. About 3-4 times a day, always runny. no blood in stool. She does "spasms in my colon" sometimes. It does not signify need to have a BM but having a BM makes the spasms  resolve.  She has never has stool studies done.  It is not clear if she had colon bx done either. No fhx of celiac or diarrhea.  Her mum has constipation problems due to MS.   12/2/21 Here for f.u visit she is taking Colestipol for 2 days and then skipping a day. Stools are semi formed, still with every meal.  Discussed stool studies which are neg, neg fecal calprotectin, nl amount of colon stool on KUB. Has colonoscopy scheduled for 12/10/21 She is having abdominal spasms. biba  c/o rt lower leg swelling x 2 days

## 2022-06-06 ENCOUNTER — OUTPATIENT (OUTPATIENT)
Dept: OUTPATIENT SERVICES | Facility: HOSPITAL | Age: 72
LOS: 1 days | End: 2022-06-06
Payer: MEDICARE

## 2022-06-06 ENCOUNTER — APPOINTMENT (OUTPATIENT)
Dept: SURGERY | Facility: CLINIC | Age: 72
End: 2022-06-06
Payer: MEDICARE

## 2022-06-06 VITALS
TEMPERATURE: 97 F | WEIGHT: 275 LBS | DIASTOLIC BLOOD PRESSURE: 77 MMHG | BODY MASS INDEX: 38.5 KG/M2 | OXYGEN SATURATION: 97 % | HEART RATE: 64 BPM | SYSTOLIC BLOOD PRESSURE: 122 MMHG | HEIGHT: 71 IN

## 2022-06-06 DIAGNOSIS — Z90.49 ACQUIRED ABSENCE OF OTHER SPECIFIED PARTS OF DIGESTIVE TRACT: Chronic | ICD-10-CM

## 2022-06-06 DIAGNOSIS — Z98.890 OTHER SPECIFIED POSTPROCEDURAL STATES: Chronic | ICD-10-CM

## 2022-06-06 DIAGNOSIS — Z00.00 ENCOUNTER FOR GENERAL ADULT MEDICAL EXAMINATION WITHOUT ABNORMAL FINDINGS: ICD-10-CM

## 2022-06-06 DIAGNOSIS — I87.303 CHRONIC VENOUS HYPERTENSION (IDIOPATHIC) W/OUT COMPLICATIONS OF BILATERAL LOWER EXTREMITY: ICD-10-CM

## 2022-06-06 PROCEDURE — 99213 OFFICE O/P EST LOW 20 MIN: CPT

## 2022-06-06 PROCEDURE — G0463: CPT

## 2022-06-06 NOTE — PHYSICAL EXAM
[Normal Breath Sounds] : Normal breath sounds [Normal Rate and Rhythm] : normal rate and rhythm [0] : right 0 [1+] : left 1+ [Ankle Swelling (On Exam)] : present [] : bilaterally [Ankle Swelling Bilaterally] : severe [Alert] : alert [Oriented to Person] : oriented to person [Oriented to Place] : oriented to place [Oriented to Time] : oriented to time [Calm] : calm [Please See PDF for [employer]] : Please See PDF for [employer]. [JVD] : no jugular venous distention  [Abdomen Tenderness] : ~T ~M No abdominal tenderness [de-identified] : nad, ambulatory, overweight/ left eye 20/20, wears glasses [de-identified] : atraumatic [de-identified] : no wheezing [FreeTextEntry1] : No overt ischemia right or left leg, feet' swollen right foot [de-identified] : ambulatory

## 2022-06-06 NOTE — HISTORY OF PRESENT ILLNESS
[FreeTextEntry1] : Mr. ROGESR SILVA is a 71 year who presents for evaluation of bilateral leg pain since November 2019\par Patient's leg discomfort is associated with  leg swelling, fatigue, heaviness,   He states that he has not completed his ablation procedures due to COVID.\par saw dr kennedy in oct \par using pump ocasionally, does not like it- michelin man jenna\par here with soocks\par discussed with dr KENNEDY- no repeat ablation needed\par both legs are closed-svg at this time\par likes previous lymphedema pump better\par would like JUZO socks\par \par Patient's symptoms have persisted despite conservative management with leg elevation, exercise, attempted weight loss, , and compression stocking use for more than 3 months. \par \par Patient's risks factor for venous disease are  obesity, history of varicose veins, spider veins \par \par Previous treatment, vein procedures and vein surgeries include:wearing compression stockings for more than 3 months with little or no relief   s/p ablation vv surgery on right leg,  treated for right leg cluster ulcers.now closed with compression therapy\par ankle r no fracture\par   \par Patient in shelter and was homeless for 6 years now living in [facility]\par

## 2022-06-06 NOTE — DATA REVIEWED
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

## 2022-06-06 NOTE — ASSESSMENT
[FreeTextEntry1] : 72 yr old m w vhtn leg swelling using compression\par would like new socks, circaid  measured previously, stable\par today  right 44.5/27 cm, left 38/23  \par \par 11/22- full measurements for lymphedema sleeves done- see atached, no open wounds currently dimensions stable\par \par previousright ankle 30.5 calf 45\par left 27.5/ 45\par using lymphedema pump emphasized compliance\par s/p ablations\par \par Patient still waiting on compression stockings from Dr. Dickey,\par  uses lymphedema pump about 2x's per week for an hour each time.\par  Prescriptions given for stockings\par chronic lymphedema

## 2022-06-10 DIAGNOSIS — I87.303 CHRONIC VENOUS HYPERTENSION (IDIOPATHIC) WITHOUT COMPLICATIONS OF BILATERAL LOWER EXTREMITY: ICD-10-CM

## 2022-08-16 NOTE — ED ADULT NURSE NOTE - EXTENSIONS OF SELF_ADULT
None Initial Quantiferon Gold (Optional): negative 5/4/22 Patient Reported Weight(Optional But Include Units): 166 lbs Add High Risk Medication Management Associated Diagnosis?: Yes Detail Level: Zone Length Of Therapy: 1 year

## 2022-09-12 ENCOUNTER — OUTPATIENT (OUTPATIENT)
Dept: OUTPATIENT SERVICES | Facility: HOSPITAL | Age: 72
LOS: 1 days | End: 2022-09-12
Payer: MEDICARE

## 2022-09-12 ENCOUNTER — APPOINTMENT (OUTPATIENT)
Dept: SURGERY | Facility: CLINIC | Age: 72
End: 2022-09-12
Payer: MEDICARE

## 2022-09-12 VITALS
OXYGEN SATURATION: 96 % | DIASTOLIC BLOOD PRESSURE: 77 MMHG | HEIGHT: 71 IN | BODY MASS INDEX: 37.1 KG/M2 | WEIGHT: 265 LBS | HEART RATE: 72 BPM | SYSTOLIC BLOOD PRESSURE: 133 MMHG | TEMPERATURE: 97 F

## 2022-09-12 DIAGNOSIS — Z98.890 OTHER SPECIFIED POSTPROCEDURAL STATES: Chronic | ICD-10-CM

## 2022-09-12 DIAGNOSIS — S81.801D UNSPECIFIED OPEN WOUND, RIGHT LOWER LEG, SUBSEQUENT ENCOUNTER: ICD-10-CM

## 2022-09-12 DIAGNOSIS — Z00.00 ENCOUNTER FOR GENERAL ADULT MEDICAL EXAMINATION WITHOUT ABNORMAL FINDINGS: ICD-10-CM

## 2022-09-12 DIAGNOSIS — Z90.49 ACQUIRED ABSENCE OF OTHER SPECIFIED PARTS OF DIGESTIVE TRACT: Chronic | ICD-10-CM

## 2022-09-12 DIAGNOSIS — S81.802A UNSPECIFIED OPEN WOUND, LEFT LOWER LEG, INITIAL ENCOUNTER: ICD-10-CM

## 2022-09-12 PROCEDURE — 99213 OFFICE O/P EST LOW 20 MIN: CPT

## 2022-09-12 PROCEDURE — G0463: CPT

## 2022-09-12 NOTE — DATA REVIEWED
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

## 2022-09-12 NOTE — PLAN
[FreeTextEntry1] : Plan- Patient wearing compression socks\par  has  htn under control , hg a1c 6.5\par \par Lymphedema pump - said sleeves are not fitting properly- speaking with vendor for that and circaids\par No open wounds\par No clinical sign of infection\par fup prn\par No clinical sign of infection

## 2022-09-12 NOTE — ASSESSMENT
[FreeTextEntry1] : 72 yr old m w vhtn leg swelling using compression\par would like new socks, circaid  measured previously, stable\par today  right 44.5/27 cm, left 38/23  \par \par 11/22- full measurements for lymphedema sleeves done- see atached, no open wounds currently dimensions stable\par \par previousright ankle 30.5 calf 45\par left 27.5/ 45\par using lymphedema pump emphasized compliance\par s/p ablations\par \par Patient still waiting on compression stockings from Dr. Dickey,\par  uses lymphedema pump about 2x's per week for an hour each time.\par  Prescriptions given for stockings\par chronic lymphedema\par 9/12/22\par 29/ 45\par left 2/43.5

## 2022-09-12 NOTE — PHYSICAL EXAM
[JVD] : no jugular venous distention  [Normal Breath Sounds] : Normal breath sounds [Normal Rate and Rhythm] : normal rate and rhythm [0] : right 0 [1+] : left 1+ [Ankle Swelling (On Exam)] : present [] : bilaterally [Ankle Swelling Bilaterally] : severe [Abdomen Tenderness] : ~T ~M No abdominal tenderness [Alert] : alert [Oriented to Person] : oriented to person [Oriented to Place] : oriented to place [Oriented to Time] : oriented to time [Calm] : calm [de-identified] : nad, ambulatory, overweight/ left eye 20/20, wears glasses [de-identified] : atraumatic [de-identified] : no wheezing [FreeTextEntry1] : No overt ischemia right or left leg, feet' swollen right foot [de-identified] : ambulatory [Please See PDF for [employer]] : Please See PDF for [employer].

## 2022-09-12 NOTE — HISTORY OF PRESENT ILLNESS
[FreeTextEntry1] : Mr. ROGERS SILVA is a 71 year who presents for evaluation of bilateral leg pain since November 2019\par Patient's leg discomfort is associated with  leg swelling, fatigue, heaviness,   He states that he has not completed his ablation procedures due to COVID.\par saw dr kennedy in oct \par using pump ocasionally, does not like it- michelin man jenna\par here with soocks\par discussed with dr KENNEDY- no repeat ablation needed\par both legs are closed-svg at this time\par likes previous lymphedema pump better\par would like JUZO socks\par \par Patient's symptoms have persisted despite conservative management with leg elevation, exercise, attempted weight loss, , and compression stocking use for more than 3 months. \par \par Patient's risks factor for venous disease are  obesity, history of varicose veins, spider veins \par \par Previous treatment, vein procedures and vein surgeries include:wearing compression stockings for more than 3 months with little or no relief   s/p ablation vv surgery on right leg,  treated for right leg cluster ulcers.now closed with compression therapy\par ankle r no fracture\par   \par Patient in shelter and was homeless for 6 years now living in [facility]\par

## 2022-09-13 DIAGNOSIS — I87.2 VENOUS INSUFFICIENCY (CHRONIC) (PERIPHERAL): ICD-10-CM

## 2022-12-31 PROBLEM — S81.801D WOUND OF RIGHT LOWER EXTREMITY, SUBSEQUENT ENCOUNTER: Status: ACTIVE | Noted: 2017-03-10

## 2022-12-31 PROBLEM — S81.802A WOUND OF LEFT LOWER EXTREMITY, INITIAL ENCOUNTER: Status: ACTIVE | Noted: 2017-02-17

## 2023-02-06 ENCOUNTER — APPOINTMENT (OUTPATIENT)
Dept: SURGERY | Facility: CLINIC | Age: 73
End: 2023-02-06
Payer: MEDICARE

## 2023-02-06 ENCOUNTER — OUTPATIENT (OUTPATIENT)
Dept: OUTPATIENT SERVICES | Facility: HOSPITAL | Age: 73
LOS: 1 days | End: 2023-02-06
Payer: MEDICARE

## 2023-02-06 VITALS
SYSTOLIC BLOOD PRESSURE: 142 MMHG | HEIGHT: 71 IN | DIASTOLIC BLOOD PRESSURE: 81 MMHG | HEART RATE: 68 BPM | BODY MASS INDEX: 37.1 KG/M2 | WEIGHT: 265 LBS | TEMPERATURE: 96.6 F | OXYGEN SATURATION: 96 %

## 2023-02-06 DIAGNOSIS — Z98.890 OTHER SPECIFIED POSTPROCEDURAL STATES: Chronic | ICD-10-CM

## 2023-02-06 DIAGNOSIS — Z90.49 ACQUIRED ABSENCE OF OTHER SPECIFIED PARTS OF DIGESTIVE TRACT: Chronic | ICD-10-CM

## 2023-02-06 DIAGNOSIS — I87.2 VENOUS INSUFFICIENCY (CHRONIC) (PERIPHERAL): ICD-10-CM

## 2023-02-06 DIAGNOSIS — H53.9 UNSPECIFIED VISUAL DISTURBANCE: ICD-10-CM

## 2023-02-06 DIAGNOSIS — Z00.00 ENCOUNTER FOR GENERAL ADULT MEDICAL EXAMINATION WITHOUT ABNORMAL FINDINGS: ICD-10-CM

## 2023-02-06 PROCEDURE — 99213 OFFICE O/P EST LOW 20 MIN: CPT

## 2023-02-06 PROCEDURE — G0463: CPT

## 2023-02-06 NOTE — PHYSICAL EXAM
[JVD] : no jugular venous distention  [Normal Breath Sounds] : Normal breath sounds [Normal Rate and Rhythm] : normal rate and rhythm [0] : right 0 [1+] : left 1+ [Ankle Swelling (On Exam)] : present [] : bilaterally [Ankle Swelling Bilaterally] : severe [Abdomen Tenderness] : ~T ~M No abdominal tenderness [Alert] : alert [Oriented to Person] : oriented to person [Oriented to Place] : oriented to place [Oriented to Time] : oriented to time [Calm] : calm [de-identified] : nad, ambulatory, overweight/ left eye 20/20, wears glasses [de-identified] : atraumatic [de-identified] : no wheezing [de-identified] : ambulatory [Please See PDF for [employer]] : Please See PDF for [employer]. [FreeTextEntry1] : right leg [FreeTextEntry2] : 1 [FreeTextEntry3] : 1 [FreeTextEntry4] : .1 [de-identified] : eschar [de-identified] : betadine [TWNoteComboBox4] : None

## 2023-02-06 NOTE — PLAN
[FreeTextEntry1] : Plan- Patient wearing compression sace\par  has  htn under control , hg a1c 6.5\par \par Lymphedema pump - - speaking with vendor for that and circaids\par No open wounds-  betadine given for eschar right leg\par No clinical sign of infection\par fup 1 month\par No clinical sign of infection

## 2023-02-06 NOTE — ASSESSMENT
[FreeTextEntry1] : 72 yr old m w vhtn leg swelling using compression, small eschar right leg\par no cellulitis\par betadine and kerlex ace wrap\par does not want new  socks, circaid  measured previously, stable\par today right27.5 ankle and 27 left\par  previously   right 44.5/27 cm, left 38/23  \par \par 11/22- full measurements for lymphedema sleeves done- see atached, no open wounds currently dimensions stable\par \par previousright ankle 30.5 calf 45\par left 27.5/ 45\par using lymphedema pump emphasized compliance\par s/p ablations\par \par Patient still waiting on compression stockings from Dr. Dickey,\par  uses lymphedema pump about 2x's per week for an hour each time.\par  Prescriptions given for stockings\par chronic lymphedema\par 9/12/22\par 29/ 45\par left 2/43.5

## 2023-02-06 NOTE — HISTORY OF PRESENT ILLNESS
[FreeTextEntry1] : Mr. ROGERS SILVA 72 who presents for evaluation of bilateral leg\par  November 2019 leg discomfort is associated with  leg swelling, fatigue, heaviness,    \par had ablation procedures  \par  \par using pump ocasionally, does not like it- vinicio man jenna\par here with aces- no support socks \par had JUZO socks\par  \par \par Patient's risks factor for venous disease are  obesity, history of varicose veins, spider veins \par \par Previous treatment, vein procedures and vein surgeries include:wearing compression stockings for more than 3 months with little or no relief   s/p ablation vv surgery on right leg,  treated for right leg cluster ulcers.now closed with compression therapy\par ankle r no fracture\par   \par Patient in shelter and was homeless for 6 years now living in [facility]\par

## 2023-02-08 DIAGNOSIS — I87.2 VENOUS INSUFFICIENCY (CHRONIC) (PERIPHERAL): ICD-10-CM

## 2023-02-08 DIAGNOSIS — I87.309 CHRONIC VENOUS HYPERTENSION (IDIOPATHIC) WITHOUT COMPLICATIONS OF UNSPECIFIED LOWER EXTREMITY: ICD-10-CM

## 2023-02-08 DIAGNOSIS — I83.012 VARICOSE VEINS OF RIGHT LOWER EXTREMITY WITH ULCER OF CALF: ICD-10-CM

## 2023-02-08 DIAGNOSIS — L97.911 NON-PRESSURE CHRONIC ULCER OF UNSPECIFIED PART OF RIGHT LOWER LEG LIMITED TO BREAKDOWN OF SKIN: ICD-10-CM

## 2023-02-08 DIAGNOSIS — H53.9 UNSPECIFIED VISUAL DISTURBANCE: ICD-10-CM

## 2023-03-17 NOTE — PATIENT PROFILE ADULT. - NS SC CAGE ALCOHOL EYE OPENER
Yes Instructions: This plan will send the code FBSD to the PM system.  DO NOT or CHANGE the price. Price (Do Not Change): 0.00 Detail Level: Simple no

## 2023-04-17 ENCOUNTER — OUTPATIENT (OUTPATIENT)
Dept: OUTPATIENT SERVICES | Facility: HOSPITAL | Age: 73
LOS: 1 days | End: 2023-04-17
Payer: MEDICARE

## 2023-04-17 ENCOUNTER — APPOINTMENT (OUTPATIENT)
Dept: SURGERY | Facility: CLINIC | Age: 73
End: 2023-04-17
Payer: MEDICARE

## 2023-04-17 VITALS
SYSTOLIC BLOOD PRESSURE: 124 MMHG | BODY MASS INDEX: 37.1 KG/M2 | DIASTOLIC BLOOD PRESSURE: 78 MMHG | HEIGHT: 71 IN | WEIGHT: 265 LBS | TEMPERATURE: 97 F | HEART RATE: 63 BPM | OXYGEN SATURATION: 96 %

## 2023-04-17 DIAGNOSIS — I87.309 CHRONIC VENOUS HYPERTENSION (IDIOPATHIC) W/OUT COMPLICATIONS OF UNSPECIFIED LOWER EXTREMITY: ICD-10-CM

## 2023-04-17 DIAGNOSIS — Z98.890 OTHER SPECIFIED POSTPROCEDURAL STATES: Chronic | ICD-10-CM

## 2023-04-17 DIAGNOSIS — Z90.49 ACQUIRED ABSENCE OF OTHER SPECIFIED PARTS OF DIGESTIVE TRACT: Chronic | ICD-10-CM

## 2023-04-17 DIAGNOSIS — Z00.00 ENCOUNTER FOR GENERAL ADULT MEDICAL EXAMINATION WITHOUT ABNORMAL FINDINGS: ICD-10-CM

## 2023-04-17 PROCEDURE — G0463: CPT

## 2023-04-17 PROCEDURE — 99213 OFFICE O/P EST LOW 20 MIN: CPT

## 2023-04-17 NOTE — PHYSICAL EXAM
[JVD] : no jugular venous distention  [Normal Breath Sounds] : Normal breath sounds [Normal Rate and Rhythm] : normal rate and rhythm [0] : right 0 [1+] : right 1+ [Ankle Swelling (On Exam)] : present [Ankle Swelling Bilaterally] : severe [] : bilaterally [Abdomen Tenderness] : ~T ~M No abdominal tenderness [Alert] : alert [Oriented to Person] : oriented to person [Oriented to Place] : oriented to place [Oriented to Time] : oriented to time [Calm] : calm [de-identified] : nad, ambulatory, overweight/ left eye 20/20, wears glasses [de-identified] : no wheezing [de-identified] : atraumatic [de-identified] : ambulatory [Please See PDF for [employer]] : Please See PDF for [employer]. [FreeTextEntry1] : right leg [FreeTextEntry2] : 1 [de-identified] : 4/17/23\par right ankle 29/ 43\par left 27/calf 40 [TWNoteComboBox4] : None

## 2023-04-17 NOTE — HISTORY OF PRESENT ILLNESS
[FreeTextEntry1] : Mr. ROGERS SILVA 72 who presents for evaluation of bilateral leg\par  November 2019 leg discomfort is associated with  leg swelling, fatigue, heaviness,    \par had ablation procedures  \par  \par using pump ocasionally, does not like it- vinicio man jenna\par here with aces- no support socks \par had JUZO socks\par  \par \par Patient's risks factor for venous disease are  obesity, history of varicose veins, spider veins \par \par Previous treatment, vein procedures and vein surgeries include:wearing compression stockings for more than 3 months with little or no relief   s/p ablation vv surgery on right leg,  treated for right leg cluster ulcers.now closed with compression therapy\par ankle r no fracture\par   \par Patient in shelter and was homeless for 6 years now living in [facility]\par

## 2023-04-17 NOTE — ASSESSMENT
[FreeTextEntry1] : 72 yr old m w vhtn leg swelling using compression, small eschar right leg\par no cellulitis\par betadine and kerlex ace wrap\par does not want new  socks, circaid  measured previously, stable\par 4/14 r/l29/27, 43/40\par recommend compression socks/ pump\par fup with pcp\par \par feb6 right27.5 ankle and 27 left\par  previously   right 44.5/27 cm, left 38/23  \par \par  \par \par previousright ankle 30.5 calf 45\par left 27.5/ 45\par using lymphedema pump emphasized compliance\par s/p ablations\par \par Patient still waiting on compression stockings from Dr. Dickey,\par  uses lymphedema pump about 2x's per week for an hour each time.\par  Prescriptions given for stockings\par chronic lymphedema\par 9/12/22\par 29/ 45\par left 2/43.5\par 4/14/23 sligfht more swelling\par not doing pumps\par would try stockings again\par will be going back to pcp, has been putting it off\par

## 2023-04-18 DIAGNOSIS — I87.2 VENOUS INSUFFICIENCY (CHRONIC) (PERIPHERAL): ICD-10-CM

## 2023-04-18 DIAGNOSIS — I87.309 CHRONIC VENOUS HYPERTENSION (IDIOPATHIC) WITHOUT COMPLICATIONS OF UNSPECIFIED LOWER EXTREMITY: ICD-10-CM

## 2023-05-15 NOTE — PACU DISCHARGE NOTE - NSPTMEETSDISCHCRITERIADT_GEN_A_CORE
Labs in July.  I will send results via [employer]PS>      Send letter for dental email     See me every 6-12 months while on methimazole.              
20-Jul-2017 20:57

## 2023-07-24 ENCOUNTER — APPOINTMENT (OUTPATIENT)
Dept: SURGERY | Facility: CLINIC | Age: 73
End: 2023-07-24

## 2023-08-02 NOTE — ED PROVIDER NOTE - NS ED MD DISPO ADMITTING SERVICE
MMED Topical Ketoconazole Counseling: Patient counseled that this medication may cause skin irritation or allergic reactions.  In the event of skin irritation, the patient was advised to reduce the amount of the drug applied or use it less frequently.   The patient verbalized understanding of the proper use and possible adverse effects of ketoconazole.  All of the patient's questions and concerns were addressed.

## 2023-09-12 ENCOUNTER — APPOINTMENT (OUTPATIENT)
Dept: UROLOGY | Facility: CLINIC | Age: 73
End: 2023-09-12
Payer: MEDICARE

## 2023-09-12 VITALS
TEMPERATURE: 96.8 F | OXYGEN SATURATION: 97 % | DIASTOLIC BLOOD PRESSURE: 76 MMHG | SYSTOLIC BLOOD PRESSURE: 115 MMHG | HEART RATE: 68 BPM

## 2023-09-12 PROCEDURE — 99204 OFFICE O/P NEW MOD 45 MIN: CPT

## 2023-09-12 PROCEDURE — 81003 URINALYSIS AUTO W/O SCOPE: CPT | Mod: QW

## 2023-09-13 LAB
BILIRUB UR QL STRIP: ABNORMAL
CLARITY UR: CLEAR
COLLECTION METHOD: NORMAL
GLUCOSE UR-MCNC: NORMAL
HCG UR QL: 0.2 EU/DL
HGB UR QL STRIP.AUTO: NORMAL
KETONES UR-MCNC: ABNORMAL
LEUKOCYTE ESTERASE UR QL STRIP: NORMAL
NITRITE UR QL STRIP: NORMAL
PH UR STRIP: 5
PROT UR STRIP-MCNC: ABNORMAL
SP GR UR STRIP: 1.03

## 2023-10-05 ENCOUNTER — APPOINTMENT (OUTPATIENT)
Dept: UROLOGY | Facility: CLINIC | Age: 73
End: 2023-10-05
Payer: MEDICARE

## 2023-10-05 PROCEDURE — 81003 URINALYSIS AUTO W/O SCOPE: CPT | Mod: QW

## 2023-10-05 PROCEDURE — 76870 US EXAM SCROTUM: CPT

## 2023-10-05 PROCEDURE — 99214 OFFICE O/P EST MOD 30 MIN: CPT | Mod: 25

## 2023-10-10 LAB
BILIRUB UR QL STRIP: ABNORMAL
CLARITY UR: CLEAR
COLLECTION METHOD: NORMAL
GLUCOSE UR-MCNC: NORMAL
HCG UR QL: 0.2 EU/DL
HGB UR QL STRIP.AUTO: NORMAL
KETONES UR-MCNC: NORMAL
LEUKOCYTE ESTERASE UR QL STRIP: NORMAL
NITRITE UR QL STRIP: NORMAL
PH UR STRIP: 5.5
PROT UR STRIP-MCNC: ABNORMAL
SP GR UR STRIP: 1.03

## 2023-11-16 NOTE — PATIENT PROFILE ADULT. - NUTRITION PROFILE
Anesthesia Transfer of Care Note    Patient: Cliff GIACOMO Pond    Procedure(s) Performed: Procedure(s) (LRB):  TURBT (TRANSURETHRAL RESECTION OF BLADDER TUMOR) (N/A)  CYSTOSCOPY (N/A)  PYELOGRAM, RETROGRADE (Bilateral)    Patient location: PACU    Anesthesia Type: general    Transport from OR: Transported from OR on 6-10 L/min O2 by face mask with adequate spontaneous ventilation    Post pain: adequate analgesia    Post assessment: no apparent anesthetic complications and tolerated procedure well    Post vital signs: stable    Level of consciousness: sedated    Nausea/Vomiting: no nausea/vomiting    Complications: none    Transfer of care protocol was followed      Last vitals: Visit Vitals  BP (!) 185/86 (BP Location: Left arm, Patient Position: Lying)   Pulse 86   Temp 36.6 °C (97.9 °F) (Temporal)   Resp 18   Wt 64.9 kg (143 lb)   SpO2 96%   BMI 23.43 kg/m²     
no indicators present

## 2024-04-09 NOTE — HISTORY OF PRESENT ILLNESS
[FreeTextEntry1] : 75 YO M seen 9/12/2023 as NPT. He comes due a swelling in his scrotum. 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 Recently he has noted an increase in the size of his scrotum that is now interfering in his normal daily routine. While this is uncomfortable due to the size, it does not cause him pain. He told me that his father had a large scrotum that he believes was also due to a hydrocele and this hung down to his knees according to the patient.  The patient also has lymphedema of his right leg and venous insufficiency of his left leg and both are under treatment. He had an umbilical hernia repair in 2017. He reports urinary frequency due to the diuretics he takes for his HTN, but denies any gross hematuria, dysuria, nocturia or prior stone disease. He is allergic to IV contrast after having a kidney scan 40 years ago. Patient has also had ED for over 4 years. He saw another urologist who ordered PDE5i medicine without much discussion and the patient has been hesitant to try it.  UA IPSS 8 RADHA 1 DAREN 5 pilocarpine, dorzolamide-timolol, brimonidine, latanoprost, amlodipine, carvedilol, eplerenone, losartan Physical exam today confirmed a right hydrocele. He will return for a scrotal US next week to further evaluate the right testis, which is not palpable today. We discussed indications for medical or surgical options, and I discussed the risks, benefits, and chances of success of a hydrocelectomy. We also discussed the reasons why I do not recommend simple aspiration. Additionally, I have asked him to send his PSA that he had done 2 weeks ago with his PCP.  As for the ED, he presently does not have a partner and would like to delay further evaluation or treatment of this problem at present.   Patient seen 10/5/2023 for scrotal US. He told me that the right hydrocele has been stable and does not cause him pain.  Scrotal US: L testis 20 ml, displaced to the left by the large right hydrocele, small left caput cyst, Right testis 17 ml, large right simple hydrocele > 7 x 6.3 cm. UA negative. We discussed ultrasound findings today consistent with a large simple right hydrocele. I reviewed with the patient today options for treatment including continued observation or hydrocelectomy under general anesthesia. We reviewed the indications risks alternatives and chances for success with each of these approaches. Patient is concerned over anesthesia and feels at the present time that his symptoms are tolerable. I recommended that we reassess in 6 months with no intervention at this time. He will consider these options and let me know of any decisions he makes otherwise follow-up with a repeat scrotal ultrasound in 6 months.   Patient seen TODAY 4/11/2024 to reassess.

## 2024-04-11 ENCOUNTER — APPOINTMENT (OUTPATIENT)
Dept: UROLOGY | Facility: CLINIC | Age: 74
End: 2024-04-11

## 2024-06-10 NOTE — H&P ADULT - FAMILY HISTORY
No pertinent family history in first degree relatives Normal rate, regular rhythm.  Heart sounds S1, S2.  No murmurs, rubs or gallops. Father  Still living? Unknown  Family history of coronary artery disease, Age at diagnosis: Age Unknown  Family history of rheumatic heart disease, Age at diagnosis: Age Unknown     Mother  Still living? Unknown  Family history of coronary artery disease, Age at diagnosis: Age Unknown

## 2024-06-19 NOTE — PLAN
Janet Jefferson  presents for a non-surgical medical weight loss management visit. She  gained 2 lbs in 3months . Pt reports eating plan has been going well overall. Past 2 weeks has been on vacation. Has not been loggin in [facility] recently-will start again this month. Janet reports the following usual day's intake: B: [place] oatmeal, S:skip, L:sandwich on whole grain bread w/ apple/orange, S:protein bar, D:pork chop/steak/burger w/ veggie stir mauricio and some rice, S: chocolate chip ice cream, .She is consuming the following beverages: water (50oz). Alcohol: none. Vitamins, minerals, and supplements reviewed with pt. Discussed continuing current regimen taking MVI. Nutrition plan includes: 1131-4402 kcal eating plan , 80-120gm protein.  Pt with prediabetes with HgbA1c of 5.9%. 75gm net carbohydrates per day. Patient plan discussed with provider. Educational materials provided: none today.     Nutrition Goals:  Track in your jaron AF83 - aim for 9126-1507 calories, 80-120gm protein, 75gm net carbohydrates   Water goal of 64oz/day   Consider making greek yogurt bark for dessert    Time spent with patient 20 minutes via telephone visit.     Lizabeth Sam MBA, RDN, LDN   [FreeTextEntry1] : Plan- Patient wearing compression socks\par  has  htn under control , hg a1c 6.5\par \par Lymphedema pump - said sleeves are not fitting properly- speaking with vendor for that and circaids\par No open wounds\par No clinical sign of infection\par fup prn\par No clinical sign of infection

## 2024-06-21 ENCOUNTER — APPOINTMENT (OUTPATIENT)
Dept: UROLOGY | Facility: CLINIC | Age: 74
End: 2024-06-21
Payer: MEDICARE

## 2024-06-21 DIAGNOSIS — N43.3 HYDROCELE, UNSPECIFIED: ICD-10-CM

## 2024-06-21 DIAGNOSIS — N52.01 ERECTILE DYSFUNCTION DUE TO ARTERIAL INSUFFICIENCY: ICD-10-CM

## 2024-06-21 PROCEDURE — 99214 OFFICE O/P EST MOD 30 MIN: CPT | Mod: 25

## 2024-06-21 PROCEDURE — 81003 URINALYSIS AUTO W/O SCOPE: CPT | Mod: QW

## 2024-06-21 PROCEDURE — 76870 US EXAM SCROTUM: CPT

## 2024-06-21 NOTE — HISTORY OF PRESENT ILLNESS
[FreeTextEntry1] : 75 YO M seen 9/12/2023 as NPT. He comes due a swelling in his scrotum. 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 Recently he has noted an increase in the size of his scrotum that is now interfering in his normal daily routine. While this is uncomfortable due to the size, it does not cause him pain. He told me that his father had a large scrotum that he believes was also due to a hydrocele and this hung down to his knees according to the patient.  The patient also has lymphedema of his right leg and venous insufficiency of his left leg and both are under treatment. He had an umbilical hernia repair in 2017. He reports urinary frequency due to the diuretics he takes for his HTN, but denies any gross hematuria, dysuria, nocturia or prior stone disease. He is allergic to IV contrast after having a kidney scan 40 years ago. Patient has also had ED for over 4 years. He saw another urologist who ordered PDE5i medicine without much discussion and the patient has been hesitant to try it.  UA IPSS 8 RADHA 1 DAREN 5 pilocarpine, dorzolamide-timolol, brimonidine, latanoprost, amlodipine, carvedilol, eplerenone, losartan Physical exam today confirmed a right hydrocele. He will return for a scrotal US next week to further evaluate the right testis, which is not palpable today. We discussed indications for medical or surgical options, and I discussed the risks, benefits, and chances of success of a hydrocelectomy. We also discussed the reasons why I do not recommend simple aspiration. Additionally, I have asked him to send his PSA that he had done 2 weeks ago with his PCP.  As for the ED, he presently does not have a partner and would like to delay further evaluation or treatment of this problem at present.   Patient seen 10/5/2023 for scrotal US. He told me that the right hydrocele has been stable and does not cause him pain.  Scrotal US: L testis 20 ml, displaced to the left by the large right hydrocele, small left caput cyst, Right testis 17 ml, large right simple hydrocele > 7 x 6.3 cm. UA negative. We discussed ultrasound findings today consistent with a large simple right hydrocele. I reviewed with the patient today options for treatment including continued observation or hydrocelectomy under general anesthesia. We reviewed the indications risks alternatives and chances for success with each of these approaches. Patient is concerned over anesthesia and feels at the present time that his symptoms are tolerable. I recommended that we reassess in 6 months with no intervention at this time. He will consider these options and let me know of any decisions he makes otherwise follow-up with a repeat scrotal ultrasound in 6 months.   Canceled 4/11/2024.  Patient seen TODAY 6/21/2024 to reassess. He feels that the right hydrocele has been stable and does not affect his comfort, but he is concerned since his grandfather had a very large scrotum in his old age, the patient describes it as having watermelons in it and he believes that these were also hydroceles. Patient is anxious not to have this happen to him. He also describes anaphylactic shock in response to an injected dye when he was in his 20's and an atypical response to anesthesia with lower truncal numbness and inability to urinate or defecate. He also told me that he had surgery as an infant on the right side for a hydrocele. UA, IPSS declined today. Scrotal US: RIGHT HEMISCROTUM: Testis: 15.9 cc 4.1 x 2.7 x 2.7 cm (L x W x H) RI 0.50 Echogenicity: Normal, Multiple microcalcifications visualized Masses: None Epididymis: Caput: 11.6 mm x 5.7 mm corpus: 4.3 mm Paratesticular findings There are varicoceles present with veins measuring 1.7 mm in greatest diameter. LEFT HEMISCROTUM: Testis: 16.7 cc 5.0 x 2.6 x 2.4 cm (L x W x H) RI 0.59 Echogenicity: Normal, Multiple microcalcifications visualized Masses: None Epididymis: Caput: 16.0 mm x 10.8 mm, corpus: 2.7 mm Paratesticular findings: There are no varicoceles present with veins measuring 2.7 mm in greatest diameter. Impression: Sonographic appearance of the testes: homogeneous Hydrocele: Right hydrocele measuring 580.4 cc's Spermatocele: Bilateral nonvascular cyst right epidydimal head measuring 2.9 mm and left 9.0 x 7.9 mm. Varicocele: No varicocele present. Intratesticular calcifications: bilaterally Resistive index: elevated bilaterally Testicular masses: none Epididymis: normal

## 2024-06-21 NOTE — PHYSICAL EXAM
[General Appearance - In No Acute Distress] : no acute distress [Oriented To Time, Place, And Person] : oriented to person, place, and time [Urethral Meatus] : meatus normal [de-identified] : Right hemiscrotum taut over a moderately large and apparently expanding right hydrocele,

## 2024-06-21 NOTE — LETTER BODY
[Dear  ___] : Dear  [unfilled], [Courtesy Letter:] : I had the pleasure of seeing your patient, [unfilled], in my office today. [Please see my note below.] : Please see my note below. [Consult Closing:] : Thank you very much for allowing me to participate in the care of this patient.  If you have any questions, please do not hesitate to contact me. [FreeTextEntry3] : Best Regards,   Ana Talbot MD

## 2024-06-21 NOTE — ASSESSMENT
[FreeTextEntry1] : We discussed findings today which include apparent expansion of the right hydrocele although it remains minimally symptomatic to the patient.  He expressed a desire to have this repaired now mainly because of his concerns that it will continue to grow and cause him issues as it did his grandfather.  I discussed with him the indications for hydrocele repair along with the risks alternatives and chances for success including the very small chance that this would recur.  We discussed causative factors concerning the surgery such as his prior surgery as an infant.  We also discussed his previous atypical response to anesthesia and his apparent potential anaphylactic reaction to likely contrast dye several decades ago.  The patient is seeing his internist Dr. Td Blanco next week and will discuss this with Dr. Blanco.  He would require medical and cardiac clearance prior to the surgery and likely should be seen by anesthesiology prior to the surgery I requested that the patient ask Dr. Hamilton to contact me after his evaluation so that we can plan appropriately. Ana Talbot MD

## 2024-06-26 LAB — PSA SERPL-MCNC: 2.98 NG/ML

## 2024-07-31 ENCOUNTER — NON-APPOINTMENT (OUTPATIENT)
Age: 74
End: 2024-07-31

## 2024-07-31 DIAGNOSIS — N43.3 HYDROCELE, UNSPECIFIED: ICD-10-CM

## 2024-08-09 ENCOUNTER — NON-APPOINTMENT (OUTPATIENT)
Age: 74
End: 2024-08-09

## 2024-10-16 DIAGNOSIS — N43.3 HYDROCELE, UNSPECIFIED: ICD-10-CM

## 2024-11-06 NOTE — ED PROVIDER NOTE - CONDUCTED A DETAILED DISCUSSION WITH PATIENT AND/OR GUARDIAN REGARDING, MDM
Render Post-Care Instructions In Note?: yes Add 52 Modifier (Optional): no Spray Paint Text: The liquid nitrogen was applied to the skin utilizing a spray paint frosting technique. Number Of Freeze-Thaw Cycles: 2 freeze-thaw cycles Detail Level: Simple Aperture Size (Optional): C Medical Necessity Information: It is in your best interest to select a reason for this procedure from the list below. All of these items fulfill various CMS LCD requirements except the new and changing color options. Application Tool (Optional): Liquid Nitrogen Sprayer Duration Of Freeze Thaw-Cycle (Seconds): 3 Consent: The patient's consent was obtained including but not limited to risks of crusting, scabbing, blistering, scarring, darker or lighter pigmentary change, recurrence, incomplete removal and infection. Medical Necessity Clause: This procedure was medically necessary because the lesions that were treated were: getting caught on reading glasses, Post-Care Instructions: I reviewed with the patient in detail post-care instructions. Patient is to wear sunprotection, and avoid picking at any of the treated lesions. Pt may apply Rubbing alcohol to treated areas two times per day for two days. need for outpatient follow-up/lab results

## 2024-11-13 ENCOUNTER — APPOINTMENT (OUTPATIENT)
Dept: UROLOGY | Facility: CLINIC | Age: 74
End: 2024-11-13
Payer: MEDICARE

## 2024-11-13 PROCEDURE — 99443: CPT | Mod: 93

## 2025-01-15 ENCOUNTER — NON-APPOINTMENT (OUTPATIENT)
Age: 75
End: 2025-01-15

## 2025-01-15 DIAGNOSIS — N43.3 HYDROCELE, UNSPECIFIED: ICD-10-CM

## 2025-02-03 NOTE — ED ADULT TRIAGE NOTE - BP NONINVASIVE SYSTOLIC (MM HG)
----- Message from Daniella sent at 2/3/2025  7:10 AM CST -----  .Type:  Patient Returning Call    Who Called: Self     Who Left Message for Patient: Jamshid Tsang MA    Does the patient know what this is regarding?: Yes     Would the patient rather a call back or a response via [facility]? Call Back     Best Call Back Number: .[phone] (home)       Additional Information:   [id number]

## 2025-02-27 NOTE — ASSESSMENT
[facility]              [address] [place] [address]                           PULMONARY FUNCTION      PATIENT NAME: FRANCHESCA LYMAN              : 1944  MED REC NO: [id number]                        ROOM:   ACCOUNT NO: [id number]                       ADMIT DATE: 2025  PROVIDER: Noble Reed DO      DATE OF PROCEDURE: 2025    A 80-year-old male, 64 inches. It is reported that he is only 64 pounds, I find that it is unlikely.     Spirometry reveals forced vital capacity of 2.60 L, 82% of predicted with an FEV1 of 1.85 L, 77% of predicted and FEV1/FVC ratio of 71%.  Mid flow rates are 69% of predicted with maximum voluntary ventilation of 70 L/minute, 74% of predicted.  The diffusing capacity is preserved at 17.23 mL/minute per mmHg which is 84% of predicted.    IMPRESSION:  Early mild airflow obstruction with no loss in gas transfer.  While the ratio is above the 70% predicted  American Thoracic Society Criteria, the Z-score meets the difficult criteria in order to define airflow obstruction. Clinical correlation needed.          NOBLE REED DO      D:  2025 14:06:03     T:  2025 20:40:56     GLADIS/TRINO  Job #:  [id number]     Doc#:  [id number]      [FreeTextEntry1] : \par Mr. ROGERS SILVA is a 69 year with persistent and worsening bilateral lower extremity venous insufficiency, CEAP classification C5 which is unresponsive to at least 3 months of compression stockings 20-30 mmHg, leg elevation, exercise.  Patient has complaints of worsening  leg discomfort with swelling, and painful varicosities.  The patient’s symptoms interfere with their ADL’s, such as walking. Patient has intact pulses in both legs without evidence of arterial insufficiency.  new right ankle pain no fractures or dvt\par recurrent valvular insufficiency\par \par Treatment is indicated not for cosmetic reasons but for symptomatic venous reflux disease with symptoms which is refractory to conservative therapy. Venous duplex study demonstrates bilateral  lower extremity venous insufficiency. The need for definitive effective treatment is based on severe, interfering and worsening reflux symptoms with evidence of venous insufficiency on venous ultrasound. \par \par Patient is a candidate for endovenous ablation treatment of the  left  GSV and bilateral  SSV. \par The risks and benefits of endovenous ablation treatment versus continued conservative management were discussed with the patient.  Patient chooses endovenous ablation treatments. Treatment plan to be scheduled. \par \par \par s/p ablation vv surgery on right leg, no new phlebitis- eva normal with calcification of vessels on plain film\par \par \par received CircAid and lymphedema pump- likes juzo/lauryn better\par asa 650 for pain prn/   compression / pump\par going to see podiatry\par \par  \par Dequan lopez is the  [phone]; \par 12/22/17- overall improvement of both legs is related to availability of a residence in [facility]\par  He is living now in [facility] [address] [facility] [address] [id number]\par moved from  [address] [place] [id number]\par rm 419\par \par \par \par

## 2025-03-06 ENCOUNTER — APPOINTMENT (OUTPATIENT)
Dept: UROLOGY | Facility: CLINIC | Age: 75
End: 2025-03-06
Payer: MEDICARE

## 2025-03-06 DIAGNOSIS — N43.3 HYDROCELE, UNSPECIFIED: ICD-10-CM

## 2025-03-06 PROCEDURE — 99214 OFFICE O/P EST MOD 30 MIN: CPT | Mod: 93

## 2025-05-22 ENCOUNTER — NON-APPOINTMENT (OUTPATIENT)
Age: 75
End: 2025-05-22

## 2025-05-22 ENCOUNTER — APPOINTMENT (OUTPATIENT)
Dept: UROLOGY | Facility: CLINIC | Age: 75
End: 2025-05-22
Payer: MEDICARE

## 2025-05-22 VITALS
TEMPERATURE: 98.2 F | SYSTOLIC BLOOD PRESSURE: 137 MMHG | DIASTOLIC BLOOD PRESSURE: 79 MMHG | HEART RATE: 73 BPM | BODY MASS INDEX: 37.1 KG/M2 | HEIGHT: 71 IN | WEIGHT: 265 LBS

## 2025-05-22 DIAGNOSIS — N43.3 HYDROCELE, UNSPECIFIED: ICD-10-CM

## 2025-05-22 DIAGNOSIS — N52.01 ERECTILE DYSFUNCTION DUE TO ARTERIAL INSUFFICIENCY: ICD-10-CM

## 2025-05-22 DIAGNOSIS — R31.21 ASYMPTOMATIC MICROSCOPIC HEMATURIA: ICD-10-CM

## 2025-05-22 PROCEDURE — 99214 OFFICE O/P EST MOD 30 MIN: CPT

## 2025-05-23 ENCOUNTER — NON-APPOINTMENT (OUTPATIENT)
Age: 75
End: 2025-05-23

## 2025-05-23 LAB
APPEARANCE: CLEAR
BACTERIA: NEGATIVE /HPF
BILIRUB UR QL STRIP: NORMAL
BILIRUBIN URINE: NEGATIVE
BLOOD URINE: NEGATIVE
CAST: NORMAL /LPF
COLOR: YELLOW
EPITHELIAL CELLS: 1 /HPF
GLUCOSE QUALITATIVE U: NEGATIVE MG/DL
GLUCOSE UR-MCNC: NORMAL
HCG UR QL: 0.2 EU/DL
HGB UR QL STRIP.AUTO: NORMAL
KETONES UR-MCNC: NORMAL
KETONES URINE: NEGATIVE MG/DL
LEUKOCYTE ESTERASE UR QL STRIP: NORMAL
LEUKOCYTE ESTERASE URINE: ABNORMAL
MICROSCOPIC-UA: NORMAL
NITRITE UR QL STRIP: NORMAL
NITRITE URINE: NEGATIVE
PH UR STRIP: 5.5
PH URINE: 5.5
PROT UR STRIP-MCNC: NORMAL
PROTEIN URINE: NEGATIVE MG/DL
RED BLOOD CELLS URINE: NORMAL /HPF
REVIEW: NORMAL
SP GR UR STRIP: 1.02
SPECIFIC GRAVITY URINE: 1.02
UROBILINOGEN URINE: 0.2 MG/DL
WHITE BLOOD CELLS URINE: 3 /HPF

## 2025-05-29 LAB — BACTERIA UR CULT: ABNORMAL

## 2025-08-22 ENCOUNTER — APPOINTMENT (OUTPATIENT)
Dept: UROLOGY | Facility: CLINIC | Age: 75
End: 2025-08-22